# Patient Record
Sex: MALE | Race: WHITE | NOT HISPANIC OR LATINO | Employment: OTHER | URBAN - METROPOLITAN AREA
[De-identification: names, ages, dates, MRNs, and addresses within clinical notes are randomized per-mention and may not be internally consistent; named-entity substitution may affect disease eponyms.]

---

## 2017-01-02 ENCOUNTER — TRANSCRIBE ORDERS (OUTPATIENT)
Dept: ADMINISTRATIVE | Facility: HOSPITAL | Age: 62
End: 2017-01-02

## 2017-01-02 ENCOUNTER — HOSPITAL ENCOUNTER (OUTPATIENT)
Dept: RADIOLOGY | Facility: HOSPITAL | Age: 62
Discharge: HOME | End: 2017-01-02
Attending: UROLOGY
Payer: COMMERCIAL

## 2017-01-02 DIAGNOSIS — N20.0 CALCULUS OF KIDNEY: ICD-10-CM

## 2017-01-02 PROCEDURE — 74000 HB X-RAY EXAM OF ABDOMEN (SINGLE ANTEROPOSTERIOR VIEW): CPT

## 2017-01-12 ENCOUNTER — ALLSCRIPTS OFFICE VISIT (OUTPATIENT)
Dept: OTHER | Facility: OTHER | Age: 62
End: 2017-01-12

## 2017-02-21 ENCOUNTER — APPOINTMENT (OUTPATIENT)
Dept: LAB | Facility: CLINIC | Age: 62
End: 2017-02-21
Payer: COMMERCIAL

## 2017-02-21 DIAGNOSIS — E11.9 TYPE 2 DIABETES MELLITUS WITHOUT COMPLICATIONS (HCC): ICD-10-CM

## 2017-02-21 LAB
ALBUMIN SERPL BCP-MCNC: 3.6 G/DL (ref 3.5–5)
ALP SERPL-CCNC: 92 U/L (ref 46–116)
ALT SERPL W P-5'-P-CCNC: 36 U/L (ref 12–78)
ANION GAP SERPL CALCULATED.3IONS-SCNC: 6 MMOL/L (ref 4–13)
AST SERPL W P-5'-P-CCNC: 26 U/L (ref 5–45)
BILIRUB SERPL-MCNC: 0.5 MG/DL (ref 0.2–1)
BUN SERPL-MCNC: 22 MG/DL (ref 5–25)
CALCIUM SERPL-MCNC: 9.3 MG/DL (ref 8.3–10.1)
CHLORIDE SERPL-SCNC: 103 MMOL/L (ref 100–108)
CHOLEST SERPL-MCNC: 113 MG/DL (ref 50–200)
CO2 SERPL-SCNC: 28 MMOL/L (ref 21–32)
CREAT SERPL-MCNC: 1.34 MG/DL (ref 0.6–1.3)
CREAT UR-MCNC: 82 MG/DL
ERYTHROCYTE [DISTWIDTH] IN BLOOD BY AUTOMATED COUNT: 14.9 % (ref 11.6–15.1)
EST. AVERAGE GLUCOSE BLD GHB EST-MCNC: 143 MG/DL
GFR SERPL CREATININE-BSD FRML MDRD: 54.2 ML/MIN/1.73SQ M
GLUCOSE SERPL-MCNC: 132 MG/DL (ref 65–140)
HBA1C MFR BLD: 6.6 % (ref 4.2–6.3)
HCT VFR BLD AUTO: 41.3 % (ref 36.5–49.3)
HDLC SERPL-MCNC: 43 MG/DL (ref 40–60)
HGB BLD-MCNC: 13.1 G/DL (ref 12–17)
LDLC SERPL CALC-MCNC: 37 MG/DL (ref 0–100)
MCH RBC QN AUTO: 27.6 PG (ref 26.8–34.3)
MCHC RBC AUTO-ENTMCNC: 31.7 G/DL (ref 31.4–37.4)
MCV RBC AUTO: 87 FL (ref 82–98)
MICROALBUMIN UR-MCNC: 10.4 MG/L (ref 0–20)
MICROALBUMIN/CREAT 24H UR: 13 MG/G CREATININE (ref 0–30)
PLATELET # BLD AUTO: 215 THOUSANDS/UL (ref 149–390)
PMV BLD AUTO: 11.2 FL (ref 8.9–12.7)
POTASSIUM SERPL-SCNC: 5.2 MMOL/L (ref 3.5–5.3)
PROT SERPL-MCNC: 7.6 G/DL (ref 6.4–8.2)
RBC # BLD AUTO: 4.75 MILLION/UL (ref 3.88–5.62)
SODIUM SERPL-SCNC: 137 MMOL/L (ref 136–145)
TRIGL SERPL-MCNC: 166 MG/DL
TSH SERPL DL<=0.05 MIU/L-ACNC: 2.21 UIU/ML (ref 0.36–3.74)
WBC # BLD AUTO: 11.73 THOUSAND/UL (ref 4.31–10.16)

## 2017-02-21 PROCEDURE — 84443 ASSAY THYROID STIM HORMONE: CPT

## 2017-02-21 PROCEDURE — 82570 ASSAY OF URINE CREATININE: CPT

## 2017-02-21 PROCEDURE — 82043 UR ALBUMIN QUANTITATIVE: CPT

## 2017-02-21 PROCEDURE — 36415 COLL VENOUS BLD VENIPUNCTURE: CPT

## 2017-02-21 PROCEDURE — 80061 LIPID PANEL: CPT

## 2017-02-21 PROCEDURE — 80053 COMPREHEN METABOLIC PANEL: CPT

## 2017-02-21 PROCEDURE — 85027 COMPLETE CBC AUTOMATED: CPT

## 2017-02-21 PROCEDURE — 83036 HEMOGLOBIN GLYCOSYLATED A1C: CPT

## 2017-03-01 ENCOUNTER — ALLSCRIPTS OFFICE VISIT (OUTPATIENT)
Dept: OTHER | Facility: OTHER | Age: 62
End: 2017-03-01

## 2017-03-24 ENCOUNTER — GENERIC CONVERSION - ENCOUNTER (OUTPATIENT)
Dept: OTHER | Facility: OTHER | Age: 62
End: 2017-03-24

## 2017-03-27 ENCOUNTER — TRANSCRIBE ORDERS (OUTPATIENT)
Dept: ADMINISTRATIVE | Facility: HOSPITAL | Age: 62
End: 2017-03-27

## 2017-03-27 ENCOUNTER — HOSPITAL ENCOUNTER (OUTPATIENT)
Dept: RADIOLOGY | Facility: HOSPITAL | Age: 62
Discharge: HOME | End: 2017-03-27
Payer: COMMERCIAL

## 2017-03-27 DIAGNOSIS — N20.0 CALCULUS OF KIDNEY: ICD-10-CM

## 2017-03-27 PROCEDURE — 74000 HB X-RAY EXAM OF ABDOMEN (SINGLE ANTEROPOSTERIOR VIEW): CPT

## 2017-04-11 ENCOUNTER — ALLSCRIPTS OFFICE VISIT (OUTPATIENT)
Dept: OTHER | Facility: OTHER | Age: 62
End: 2017-04-11

## 2017-06-01 ENCOUNTER — APPOINTMENT (OUTPATIENT)
Dept: LAB | Facility: CLINIC | Age: 62
End: 2017-06-01
Payer: COMMERCIAL

## 2017-06-01 ENCOUNTER — GENERIC CONVERSION - ENCOUNTER (OUTPATIENT)
Dept: OTHER | Facility: OTHER | Age: 62
End: 2017-06-01

## 2017-06-01 ENCOUNTER — TRANSCRIBE ORDERS (OUTPATIENT)
Dept: LAB | Facility: CLINIC | Age: 62
End: 2017-06-01

## 2017-06-01 DIAGNOSIS — E11.9 TYPE 2 DIABETES MELLITUS WITHOUT COMPLICATIONS (HCC): ICD-10-CM

## 2017-06-01 LAB
ALBUMIN SERPL BCP-MCNC: 3.3 G/DL (ref 3.5–5)
ALP SERPL-CCNC: 84 U/L (ref 46–116)
ALT SERPL W P-5'-P-CCNC: 30 U/L (ref 12–78)
ANION GAP SERPL CALCULATED.3IONS-SCNC: 10 MMOL/L (ref 4–13)
AST SERPL W P-5'-P-CCNC: 21 U/L (ref 5–45)
BILIRUB SERPL-MCNC: 0.3 MG/DL (ref 0.2–1)
BUN SERPL-MCNC: 21 MG/DL (ref 5–25)
CALCIUM SERPL-MCNC: 8.8 MG/DL (ref 8.3–10.1)
CHLORIDE SERPL-SCNC: 103 MMOL/L (ref 100–108)
CHOLEST SERPL-MCNC: 121 MG/DL (ref 50–200)
CO2 SERPL-SCNC: 25 MMOL/L (ref 21–32)
CREAT SERPL-MCNC: 1.36 MG/DL (ref 0.6–1.3)
ERYTHROCYTE [DISTWIDTH] IN BLOOD BY AUTOMATED COUNT: 14.4 % (ref 11.6–15.1)
EST. AVERAGE GLUCOSE BLD GHB EST-MCNC: 143 MG/DL
GFR SERPL CREATININE-BSD FRML MDRD: 53.3 ML/MIN/1.73SQ M
GLUCOSE P FAST SERPL-MCNC: 141 MG/DL (ref 65–99)
HBA1C MFR BLD: 6.6 % (ref 4.2–6.3)
HCT VFR BLD AUTO: 41 % (ref 36.5–49.3)
HDLC SERPL-MCNC: 41 MG/DL (ref 40–60)
HGB BLD-MCNC: 13.3 G/DL (ref 12–17)
LDLC SERPL CALC-MCNC: 49 MG/DL (ref 0–100)
MCH RBC QN AUTO: 28.5 PG (ref 26.8–34.3)
MCHC RBC AUTO-ENTMCNC: 32.4 G/DL (ref 31.4–37.4)
MCV RBC AUTO: 88 FL (ref 82–98)
PLATELET # BLD AUTO: 191 THOUSANDS/UL (ref 149–390)
PMV BLD AUTO: 11.6 FL (ref 8.9–12.7)
POTASSIUM SERPL-SCNC: 4.7 MMOL/L (ref 3.5–5.3)
PROT SERPL-MCNC: 7 G/DL (ref 6.4–8.2)
RBC # BLD AUTO: 4.67 MILLION/UL (ref 3.88–5.62)
SODIUM SERPL-SCNC: 138 MMOL/L (ref 136–145)
TRIGL SERPL-MCNC: 157 MG/DL
WBC # BLD AUTO: 9.41 THOUSAND/UL (ref 4.31–10.16)

## 2017-06-01 PROCEDURE — 80053 COMPREHEN METABOLIC PANEL: CPT

## 2017-06-01 PROCEDURE — 83036 HEMOGLOBIN GLYCOSYLATED A1C: CPT

## 2017-06-01 PROCEDURE — 80061 LIPID PANEL: CPT

## 2017-06-01 PROCEDURE — 36415 COLL VENOUS BLD VENIPUNCTURE: CPT

## 2017-06-01 PROCEDURE — 85027 COMPLETE CBC AUTOMATED: CPT

## 2017-06-07 ENCOUNTER — ALLSCRIPTS OFFICE VISIT (OUTPATIENT)
Dept: OTHER | Facility: OTHER | Age: 62
End: 2017-06-07

## 2017-09-11 ENCOUNTER — HOSPITAL ENCOUNTER (OUTPATIENT)
Dept: RADIOLOGY | Facility: HOSPITAL | Age: 62
Discharge: HOME | End: 2017-09-11
Payer: COMMERCIAL

## 2017-09-11 ENCOUNTER — TRANSCRIBE ORDERS (OUTPATIENT)
Dept: ADMINISTRATIVE | Facility: HOSPITAL | Age: 62
End: 2017-09-11

## 2017-09-11 ENCOUNTER — APPOINTMENT (OUTPATIENT)
Dept: LAB | Facility: CLINIC | Age: 62
End: 2017-09-11
Payer: COMMERCIAL

## 2017-09-11 DIAGNOSIS — Z12.5 ENCOUNTER FOR SCREENING FOR MALIGNANT NEOPLASM OF PROSTATE: ICD-10-CM

## 2017-09-11 DIAGNOSIS — N20.0 CALCULUS OF KIDNEY: ICD-10-CM

## 2017-09-11 DIAGNOSIS — E11.9 TYPE 2 DIABETES MELLITUS WITHOUT COMPLICATIONS (HCC): ICD-10-CM

## 2017-09-11 LAB — PSA SERPL-MCNC: 1.2 NG/ML (ref 0–4)

## 2017-09-11 PROCEDURE — G0103 PSA SCREENING: HCPCS

## 2017-09-11 PROCEDURE — 74000 HB X-RAY EXAM OF ABDOMEN (SINGLE ANTEROPOSTERIOR VIEW): CPT

## 2017-09-11 PROCEDURE — 36415 COLL VENOUS BLD VENIPUNCTURE: CPT

## 2017-10-03 ENCOUNTER — APPOINTMENT (OUTPATIENT)
Dept: LAB | Facility: CLINIC | Age: 62
End: 2017-10-03
Payer: COMMERCIAL

## 2017-10-03 ENCOUNTER — TRANSCRIBE ORDERS (OUTPATIENT)
Dept: LAB | Facility: CLINIC | Age: 62
End: 2017-10-03

## 2017-10-03 DIAGNOSIS — E11.9 TYPE 2 DIABETES MELLITUS WITHOUT COMPLICATIONS (HCC): ICD-10-CM

## 2017-10-03 DIAGNOSIS — N20.0 CALCULUS OF KIDNEY: ICD-10-CM

## 2017-10-03 LAB
ALBUMIN SERPL BCP-MCNC: 3.3 G/DL (ref 3.5–5)
ALP SERPL-CCNC: 86 U/L (ref 46–116)
ALT SERPL W P-5'-P-CCNC: 47 U/L (ref 12–78)
ANION GAP SERPL CALCULATED.3IONS-SCNC: 6 MMOL/L (ref 4–13)
AST SERPL W P-5'-P-CCNC: 27 U/L (ref 5–45)
BILIRUB SERPL-MCNC: 0.3 MG/DL (ref 0.2–1)
BUN SERPL-MCNC: 23 MG/DL (ref 5–25)
CALCIUM SERPL-MCNC: 9.4 MG/DL (ref 8.3–10.1)
CHLORIDE SERPL-SCNC: 102 MMOL/L (ref 100–108)
CHOLEST SERPL-MCNC: 119 MG/DL (ref 50–200)
CO2 SERPL-SCNC: 28 MMOL/L (ref 21–32)
CREAT SERPL-MCNC: 1.31 MG/DL (ref 0.6–1.3)
ERYTHROCYTE [DISTWIDTH] IN BLOOD BY AUTOMATED COUNT: 14.2 % (ref 11.6–15.1)
EST. AVERAGE GLUCOSE BLD GHB EST-MCNC: 148 MG/DL
GFR SERPL CREATININE-BSD FRML MDRD: 58 ML/MIN/1.73SQ M
GLUCOSE P FAST SERPL-MCNC: 150 MG/DL (ref 65–99)
HBA1C MFR BLD: 6.8 % (ref 4.2–6.3)
HCT VFR BLD AUTO: 39.2 % (ref 36.5–49.3)
HDLC SERPL-MCNC: 37 MG/DL (ref 40–60)
HGB BLD-MCNC: 12.5 G/DL (ref 12–17)
LDLC SERPL CALC-MCNC: 40 MG/DL (ref 0–100)
MCH RBC QN AUTO: 28.6 PG (ref 26.8–34.3)
MCHC RBC AUTO-ENTMCNC: 31.9 G/DL (ref 31.4–37.4)
MCV RBC AUTO: 90 FL (ref 82–98)
PLATELET # BLD AUTO: 193 THOUSANDS/UL (ref 149–390)
PMV BLD AUTO: 11.6 FL (ref 8.9–12.7)
POTASSIUM SERPL-SCNC: 4.9 MMOL/L (ref 3.5–5.3)
PROT SERPL-MCNC: 7 G/DL (ref 6.4–8.2)
RBC # BLD AUTO: 4.37 MILLION/UL (ref 3.88–5.62)
SODIUM SERPL-SCNC: 136 MMOL/L (ref 136–145)
TRIGL SERPL-MCNC: 211 MG/DL
TSH SERPL DL<=0.05 MIU/L-ACNC: 3.01 UIU/ML (ref 0.36–3.74)
URATE SERPL-MCNC: 6.5 MG/DL (ref 4.2–8)
WBC # BLD AUTO: 9.94 THOUSAND/UL (ref 4.31–10.16)

## 2017-10-03 PROCEDURE — 80061 LIPID PANEL: CPT

## 2017-10-03 PROCEDURE — 36415 COLL VENOUS BLD VENIPUNCTURE: CPT

## 2017-10-03 PROCEDURE — 84550 ASSAY OF BLOOD/URIC ACID: CPT

## 2017-10-03 PROCEDURE — 84443 ASSAY THYROID STIM HORMONE: CPT

## 2017-10-03 PROCEDURE — 85027 COMPLETE CBC AUTOMATED: CPT

## 2017-10-03 PROCEDURE — 80053 COMPREHEN METABOLIC PANEL: CPT

## 2017-10-03 PROCEDURE — 83036 HEMOGLOBIN GLYCOSYLATED A1C: CPT

## 2017-10-16 ENCOUNTER — GENERIC CONVERSION - ENCOUNTER (OUTPATIENT)
Dept: OTHER | Facility: OTHER | Age: 62
End: 2017-10-16

## 2017-10-20 ENCOUNTER — APPOINTMENT (OUTPATIENT)
Dept: LAB | Facility: CLINIC | Age: 62
End: 2017-10-20
Payer: COMMERCIAL

## 2017-10-20 ENCOUNTER — ALLSCRIPTS OFFICE VISIT (OUTPATIENT)
Dept: OTHER | Facility: OTHER | Age: 62
End: 2017-10-20

## 2017-10-20 ENCOUNTER — TRANSCRIBE ORDERS (OUTPATIENT)
Dept: LAB | Facility: CLINIC | Age: 62
End: 2017-10-20

## 2017-10-20 DIAGNOSIS — N20.0 CALCULUS OF KIDNEY: ICD-10-CM

## 2017-10-20 LAB
MAGNESIUM SERPL-MCNC: 1.7 MG/DL (ref 1.6–2.6)
PHOSPHATE SERPL-MCNC: 4 MG/DL (ref 2.3–4.1)
PTH-INTACT SERPL-MCNC: 24.9 PG/ML (ref 14–72)

## 2017-10-20 PROCEDURE — 36415 COLL VENOUS BLD VENIPUNCTURE: CPT

## 2017-10-20 PROCEDURE — 83970 ASSAY OF PARATHORMONE: CPT

## 2017-10-20 PROCEDURE — 83735 ASSAY OF MAGNESIUM: CPT

## 2017-10-20 PROCEDURE — 84100 ASSAY OF PHOSPHORUS: CPT

## 2017-10-23 ENCOUNTER — TRANSCRIBE ORDERS (OUTPATIENT)
Dept: LAB | Facility: CLINIC | Age: 62
End: 2017-10-23

## 2017-10-23 ENCOUNTER — APPOINTMENT (OUTPATIENT)
Dept: LAB | Facility: CLINIC | Age: 62
End: 2017-10-23
Payer: COMMERCIAL

## 2017-10-23 DIAGNOSIS — N20.0 CALCULUS OF KIDNEY: ICD-10-CM

## 2017-10-23 PROCEDURE — 82436 ASSAY OF URINE CHLORIDE: CPT

## 2017-10-23 PROCEDURE — 84105 ASSAY OF URINE PHOSPHORUS: CPT

## 2017-10-23 PROCEDURE — 82507 ASSAY OF CITRATE: CPT

## 2017-10-23 PROCEDURE — 84133 ASSAY OF URINE POTASSIUM: CPT

## 2017-10-23 PROCEDURE — 82570 ASSAY OF URINE CREATININE: CPT

## 2017-10-23 PROCEDURE — 82140 ASSAY OF AMMONIA: CPT

## 2017-10-23 PROCEDURE — 83935 ASSAY OF URINE OSMOLALITY: CPT

## 2017-10-23 PROCEDURE — 82340 ASSAY OF CALCIUM IN URINE: CPT

## 2017-10-23 PROCEDURE — 81003 URINALYSIS AUTO W/O SCOPE: CPT

## 2017-10-23 PROCEDURE — 83945 ASSAY OF OXALATE: CPT

## 2017-10-23 PROCEDURE — 83735 ASSAY OF MAGNESIUM: CPT

## 2017-10-23 PROCEDURE — 84392 ASSAY OF URINE SULFATE: CPT

## 2017-10-23 PROCEDURE — 84560 ASSAY OF URINE/URIC ACID: CPT

## 2017-10-23 PROCEDURE — 82131 AMINO ACIDS SINGLE QUANT: CPT

## 2017-10-23 PROCEDURE — 84300 ASSAY OF URINE SODIUM: CPT

## 2017-10-24 NOTE — PROGRESS NOTES
Assessment  1  Left nephrolithiasis (592 0) (N20 0)   2  Nephrolithiasis (592 0) (N20 0)   3  Encounter for prostate cancer screening (V76 44) (Z12 5)    Plan  Nephrolithiasis    · (1) MAGNESIUM; Status:Resulted - Requires Verification;   Done: 90QRY8404 10:58AM   MUB:32QAH5537; Ordered;For:Nephrolithiasis; Ordered By:PASCUAL De Los Santos AT [facility];   · (1) PHOSPHORUS; Status:Resulted - Requires Verification;   Done: 35YAX6067 10:58AM   BQO:82WTV5223; Ordered;For:Nephrolithiasis; Ordered By:Johnna De Los Santos;   · (1) PTH N-TERMINAL (INTACT); Status:Resulted - Requires Verification;   Done:  60MLY1369 10:58AM   CWY:68SPK3947; Ordered;For:Nephrolithiasis; Ordered By:Johnna De Los Santos;   · (1) [facility]; Status:Active - Retrospective Authorization; Requested IPW:[id number];    Perform:[facility]; [facility]:[id number]; Last Updated By:PASCUAL De Los Santos AT THE [facility]; 10/20/2017 10:04:46 AM;Ordered;For:Nephrolithiasis; Ordered By:PASCUAL De Los Santos AT [facility]; Discussion/Summary  Discussion Summary:   1  nephrolithiasis -- managed by Dr Devin Franco the patient his KUB resultsoptions of continued observation versus ESWL  We will continue observation at this pointwill go for metabolic workup and follow up in 1 month to review the results  to contact us sooner with any s/sx of passing stoneall questions answered  Chief Complaint  Chief Complaint Free Text Note Form: Patient presents for prostate cancer screening, nephrolithiasis  1 2 (9/11/17)      History of Present Illness  HPI: Daylin Estes is a 26-year-old male patient of Dr Daniel Taylor with a history of nephrolithiasis status post right ESWL 11/17/2016 presenting for 6 month follow-up  most recent KUB reveals bilateral renal calculi with increased stone burden on the left  Patient denies any interval passage of stones or the past few months  She denies any flank pain, gross hematuria, dysuria, nausea, vomiting, fevers, or chills    feels like he has a fair stream, feels empty after urination, has nocturia 1-2 times nightly  He denies any urgency, hematuria, dysuria, hesitancy, or incontinence  most recent PSA was 1 2 (9/11/17)  Review of Systems  Complete-Male Urology:   Constitutional: No fever or chills, feels well, no tiredness, no recent weight gain or weight loss  Respiratory: No complaints of shortness of breath, no wheezing, no cough, no SOB on exertion, no orthopnea or PND  Cardiovascular: No complaints of slow heart rate, no fast heart rate, no chest pain, no palpitations, no leg claudication, no lower extremity  Gastrointestinal: No complaints of abdominal pain, no constipation, no nausea or vomiting, no diarrhea or bloody stools  Genitourinary: Empty sensation-- and-- stream quality fair, but-- no dysuria,-- no urinary hesitancy,-- no hematuria,-- no incontinence-- and-- no feelings of urinary urgency--    The patient presents with complaints of nocturia (1-2 times )  Musculoskeletal: No complaints of arthralgia, no myalgias, no joint swelling or stiffness, no limb pain or swelling  Integumentary: No complaints of skin rash or skin lesions, no itching, no skin wound, no dry skin  Hematologic/Lymphatic: No complaints of swollen glands, no swollen glands in the neck, does not bleed easily, no easy bruising  Neurological: No compliants of headache, no confusion, no convulsions, no numbness or tingling, no dizziness or fainting, no limb weakness, no difficulty walking  ROS Reviewed:   ROS reviewed  Active Problems  1  Benign essential hypertension (401 1) (I10)   2  Callus (700) (L84)   3  Carpal tunnel syndrome, bilateral (354 0) (G56 03)   4  Colonoscopy (Fiberoptic) Screening   5  Constipation due to opioid therapy (564 09,E935 2) (K59 03,T40 2X5A)   6  Encounter for prostate cancer screening (V76 44) (Z12 5)   7  Hyperkalemia (276 7) (E87 5)   8  Hyperlipidemia (272 4) (E78 5)   9   Laceration of face, multiple sites (873 49) (S01 81XA)   10  Left nephrolithiasis (592 0) (N20 0)   11  Leukocytosis (288 60) (D72 829)   12  Morbid or severe obesity due to excess calories (278 01) (E66 01)   13  Need for pneumococcal vaccination (V03 82) (Z23)   14  Nephrolithiasis (592 0) (N20 0)   15  Plantar callus (700) (L84)   16  Renal insufficiency (593 9) (N28 9)   17  Type 2 diabetes mellitus (250 00) (E11 9)    Past Medical History  1  History of _   2  History of _   3  History of _   4  History of Gout (274 9) (M10 9)   5  History of leukocytosis (V12 3) (Z86 2)   6  History of onychomycosis (V12 09) (Z86 19)   7  History of Other muscle spasm (728 85) (M62 838)   8  History of Shoulder joint pain, unspecified laterality  Active Problems And Past Medical History Reviewed: The active problems and past medical history were reviewed and updated today  Surgical History  1  History of Femoral Hernia Repair Unilateral   2  History of Neuroplasty Median Nerve At Carpal Tunnel  Surgical History Reviewed: The surgical history was reviewed and updated today  Family History  Mother    1  Family history of Coronary Artery Disease (V17 49)  Father    2  Family history of Heart Disease (V17 49)   3  Family history of Lung Cancer (V16 1)  Family History Reviewed: The family history was reviewed and updated today  Social History   · Living Independently Alone (V60 3)   · Marital History - Single   · Never A Smoker   · Never Drank Alcohol  Social History Reviewed: The social history was reviewed and is unchanged  Current Meds   1  Allopurinol 100 MG Oral Tablet; take 1 tablet by mouth every day; Therapy: 37TBY5701 to (Evaluate:12Qhm6502)  Requested for: 84Jzu4158; Last   Rx:28Ifd5724 Ordered   2  Cinnamon TABS; Therapy: (Recorded:41Tnm4919) to Recorded   3  Januvia 100 MG Oral Tablet; take 1 tablet by mouth every morning; Therapy: 70MZN3483 to (Evaluate:25Qgj7280)  Requested for: 93Kca8798; Last   Rx:53Juo1604 Ordered   4  Lisinopril 10 MG Oral Tablet; TAKE 1 TABLET BY MOUTH ONCE A DAY TO PROTECT   DIABETIC KIDNEY; Therapy: 99MTB6611 to (Evaluate:61Bwo3052)  Requested for: 99BPF6754; Last   Rx:30Nip5164 Ordered   5  MetFORMIN HCl - 1000 MG Oral Tablet; TAKE 1 TABLET BY MOUTH EVERY MORNING   AND TAKE 1 TABLET BY MOUTH BEFORE SUPPER; Therapy: 19SJF2381 to (Evaluate:32Ini6324)  Requested for: 05MNK7659; Last   Rx:66Vap6131 Ordered   6  Metoprolol Tartrate 25 MG Oral Tablet; Take 1 tablet twice daily; Therapy: 13GSM0664 to (([phone] 3260-775)  Requested for: 94JMY9306; Last   OR:10UJS0443 Ordered   7  Multiple Vitamin TABS; Therapy: (Recorded:24Aev5126) to Recorded   8  Simvastatin 10 MG Oral Tablet; TAKE 1 TABLET BY MOUTH ONCE A DAY; Therapy: 87BEC1134 to (Mira Singh)  Requested for: 36Itf0599; Last   Rx:27Cep5423 Ordered  Medication List Reviewed: The medication list was reviewed and updated today  Allergies  1  Penicillins    Vitals  Vital Signs    Recorded: 84CUZ8577 09:37AM   Heart Rate 68   Systolic 391   Diastolic 78   Height 5 ft 10 in   Weight 294 lb 4 oz   BMI Calculated 42 22   BSA Calculated 2 46     Physical Exam    Constitutional   General appearance: No acute distress, well appearing and well nourished  Pulmonary   Respiratory effort: No increased work of breathing or signs of respiratory distress  Cardiovascular   Examination of extremities for edema and/or varicosities: Normal     Musculoskeletal   Gait and station: Normal     Skin   Skin and subcutaneous tissue: Normal without rashes or lesions  Additional Exam:  no focal neurologic deficits  Mood and affect appropriate        Results/Data  (1) PHOSPHORUS 20Oct2017 10:58AM Venessa Jackson    Order Number: [id number]     Test Name Result Flag Reference   PHOSPHORUS 4 0 mg/dL  2 3-4 1     AUA Symptom Score 45VNY5748 09:37AM Beverley, s     Test Name Result Flag Reference   AUA Symptom Score (for prostate disease) 2 Incomplete emptying: Not at all (0)  Frequency: Not at all (0)  Intermittency: Not at all (0)  Urgency: Not at all (0)  Weak-stream: Not at all (0)  Straining: Not at all (0)  Nocturia: Less than half the time (2)   AUA Symptom Score (for prostate disease) - Quality of Life Due to Urinary Symptoms Pleased     AUA Symptom Score (for prostate disease) - Score Category Mild         Future Appointments    Date/Time Provider Specialty Site   01/22/2018 08:20 ROSALIE Martinez   Family Medicine FAMILY PRACTICE [facility]   11/08/2017 11:00 AM [address] [facility] [address] [place] [address] FOR UROLOGY Carey Sanchez     Signatures   Electronically signed by : Yoana Linton, ; Oct 20 2017  3:12PM EST                       (Author)    Electronically signed by : ROSALIE Velazquez ; Oct 23 2017  3:44PM EST

## 2017-11-06 LAB
AMMONIA 24H UR-MRATE: 24 MEQ/24 HR
AMMONIA UR-SCNC: ABNORMAL UG/DL
CA H2 PHOS DIHYD CRY URNS QL MICRO: 0.05 RATIO (ref 0–3)
CALCIUM 24H UR-MCNC: 4.7 MG/DL
CALCIUM 24H UR-MRATE: 98.7 MG/24 HR (ref 100–300)
CHLORIDE 24H UR-SCNC: 67 MMOL/L
CHLORIDE 24H UR-SRATE: 141 MMOL/24 HR (ref 110–250)
CITRATE 24H UR-MCNC: 46 MG/L
CITRATE 24H UR-MRATE: 97 MG/24 HR (ref 320–1240)
COM CRY STONE QL IR: 3.81 RATIO (ref 0–6)
CREAT 24H UR-MCNC: 56.8 MG/DL
CREAT 24H UR-MRATE: 1192.8 MG/24 HR (ref 1000–2000)
CYSTINE 24H UR-MCNC: 13.5 MG/L
CYSTINE 24H UR-MRATE: 28.35 MG/24 HR (ref 10–100)
MAGNESIUM 24H UR-MRATE: 101 MG/24 HR (ref 12–293)
MAGNESIUM UR-MCNC: 4.8 MG/DL
NA URATE CRY STONE QL IR: 0.64 RATIO (ref 0–4)
OSMOLALITY UR: 412 MOSMOL/KG (ref 300–900)
OXALATE 24H UR-MRATE: 38 MG/24 HR (ref 7–44)
OXALATE UR-MCNC: 18 MG/L
PH 24H UR: 5.2 [PH]
PHOSPHATE 24H UR-MCNC: 15.8 MG/DL
PHOSPHATE 24H UR-MRATE: 331.8 MG/24 HR (ref 400–1300)
PLEASE NOTE (STONE RISK): ABNORMAL
POTASSIUM 24H UR-SCNC: 40.1 MMOL/24 HR (ref 25–125)
POTASSIUM UR-SCNC: 19.1 MMOL/L
PRESERVED URINE: 2100 ML/24 HR (ref 800–1800)
SODIUM 24H UR-SCNC: 73 MMOL/L
SODIUM 24H UR-SRATE: 153 MMOL/24 HR (ref 58–337)
SPECIMEN VOL 24H UR: 2100 ML/24 HR (ref 800–1800)
SULFATE 24H UR-MCNC: 21 MEQ/24 HR (ref 0–30)
SULFATE UR-MCNC: 10 MEQ/L
TRI-PHOS CRY STONE MICRO: 0.01 RATIO (ref 0–1)
URATE 24H UR-MCNC: 16.3 MG/DL
URATE 24H UR-MRATE: 342 MG/24 HR (ref 250–750)
URATE DIHYD CRY STONE QL IR: 2.16 RATIO (ref 0–1.2)

## 2017-11-08 ENCOUNTER — GENERIC CONVERSION - ENCOUNTER (OUTPATIENT)
Dept: OTHER | Facility: OTHER | Age: 62
End: 2017-11-08

## 2018-01-09 NOTE — RESULT NOTES
Verified Results  (1) CBC/ PLT (NO DIFF) 46CJS9205 08:34AM Fabiola Amrita Order Number: [id number]     Test Name Result Flag Reference   HEMATOCRIT 41 0 %  36 5-49 3   HEMOGLOBIN 13 3 g/dL  12 0-17 0   MCHC 32 4 g/dL  31 4-37 4   MCH 28 5 pg  26 8-34 3   MCV 88 fL  82-98   PLATELET COUNT 277 Thousands/uL  149-390   RBC COUNT 4 67 Million/uL  3 88-5 62   RDW 14 4 %  11 6-15 1   WBC COUNT 9 41 Thousand/uL  4 31-10 16   MPV 11 6 fL  8 9-12 7     (1) COMPREHENSIVE METABOLIC PANEL 58YYC1878 85:02JU Fabiola Amrita Order Number: [id number]     Test Name Result Flag Reference   SODIUM 138 mmol/L  136-145   POTASSIUM 4 7 mmol/L  3 5-5 3   CHLORIDE 103 mmol/L  100-108   CARBON DIOXIDE 25 mmol/L  21-32   ANION GAP (CALC) 10 mmol/L  4-13   BLOOD UREA NITROGEN 21 mg/dL  5-25   CREATININE 1 36 mg/dL H 0 60-1 30   Standardized to IDMS reference method   CALCIUM 8 8 mg/dL  8 3-10 1   BILI, TOTAL 0 30 mg/dL  0 20-1 00   ALK PHOSPHATAS 84 U/L     ALT (SGPT) 30 U/L  12-78   AST(SGOT) 21 U/L  5-45   ALBUMIN 3 3 g/dL L 3 5-5 0   TOTAL PROTEIN 7 0 g/dL  6 4-8 2   eGFR Non-African American 53 3 ml/min/1 73sq m     National Kidney Disease Education Program recommendations are as follows:  GFR calculation is accurate only with a steady state creatinine  Chronic Kidney disease less than 60 ml/min/1 73 sq  meters  Kidney failure less than 15 ml/min/1 73 sq  meters  GLUCOSE FASTING 141 mg/dL H 65-99     (1) HEMOGLOBIN A1C 01Jun2017 08:34AM Fabiola Amrita Order Number: [id number]     Test Name Result Flag Reference   HEMOGLOBIN A1C 6 6 % H 4 2-6 3   EST  AVG  GLUCOSE 143 mg/dl       (1) LIPID PANEL, FASTING 01Jun2017 08:34AM Fabiola Amrita Order Number: [id number]     Test Name Result Flag Reference   CHOLESTEROL 121 mg/dL     HDL,DIRECT 41 mg/dL  40-60   Specimen collection should occur prior to Metamizole administration due to the potential for falsely depressed results  LDL CHOLESTEROL CALCULATED 49 mg/dL  0-100   - Patient Instructions: This is a fasting blood test  Water,black tea or black  coffee only after 9:00pm the night before test   Drink 2 glasses of water the morning of test       Triglyceride:         Normal              <150 mg/dl       Borderline High    150-199 mg/dl       High               200-499 mg/dl       Very High          >499 mg/dl  Cholesterol:         Desirable        <200 mg/dl      Borderline High  200-239 mg/dl      High             >239 mg/dl  HDL Cholesterol:        High    >59 mg/dL      Low     <41 mg/dL  LDL CALCULATED:    This screening LDL is a calculated result  It does not have the accuracy of the Direct Measured LDL in the monitoring of patients with hyperlipidemia and/or statin therapy  Direct Measure LDL (VOC155) must be ordered separately in these patients  TRIGLYCERIDES 157 mg/dL H <=150   Specimen collection should occur prior to N-Acetylcysteine or Metamizole administration due to the potential for falsely depressed results  Plan  Type 2 diabetes mellitus    · (1) HEMOGLOBIN A1C ; every 3 months;  Last 15CRS7772; Next 52Sxq1272;  Status:Active

## 2018-01-10 NOTE — MISCELLANEOUS
Message  GI Reminder Recall [employer]:   Date: 04/12/2016   Dear Tee Hurtado:     Review of our records shows you are due for the following: Colonoscopy  Please call the following office to schedule your appointment:   [address] [facility], [facility] [address] [place] [address], [address] [place] [address]  [phone]  We look forward to hearing from you! Sincerely,         Signatures   Electronically signed by :  Sagar Mathias, ; Apr 12 2016 12:31PM EST                       (Author)

## 2018-01-12 VITALS
BODY MASS INDEX: 40.09 KG/M2 | HEIGHT: 70 IN | DIASTOLIC BLOOD PRESSURE: 70 MMHG | RESPIRATION RATE: 16 BRPM | HEART RATE: 70 BPM | SYSTOLIC BLOOD PRESSURE: 130 MMHG | WEIGHT: 280 LBS

## 2018-01-13 VITALS
BODY MASS INDEX: 42.13 KG/M2 | HEIGHT: 70 IN | DIASTOLIC BLOOD PRESSURE: 78 MMHG | WEIGHT: 294.25 LBS | SYSTOLIC BLOOD PRESSURE: 138 MMHG | HEART RATE: 68 BPM

## 2018-01-13 VITALS
WEIGHT: 294 LBS | HEART RATE: 82 BPM | SYSTOLIC BLOOD PRESSURE: 150 MMHG | RESPIRATION RATE: 16 BRPM | HEIGHT: 70 IN | DIASTOLIC BLOOD PRESSURE: 72 MMHG | BODY MASS INDEX: 42.09 KG/M2

## 2018-01-14 VITALS
DIASTOLIC BLOOD PRESSURE: 72 MMHG | BODY MASS INDEX: 41.54 KG/M2 | HEART RATE: 68 BPM | SYSTOLIC BLOOD PRESSURE: 128 MMHG | HEIGHT: 70 IN | WEIGHT: 290.13 LBS

## 2018-01-14 VITALS
BODY MASS INDEX: 42.66 KG/M2 | WEIGHT: 298 LBS | SYSTOLIC BLOOD PRESSURE: 118 MMHG | HEART RATE: 70 BPM | DIASTOLIC BLOOD PRESSURE: 80 MMHG | HEIGHT: 70 IN

## 2018-01-15 NOTE — MISCELLANEOUS
Assessment   1  Left nephrolithiasis (592 0) (N20 0)  2  Hyperlipidemia (272 4) (E78 5)  3  Benign essential hypertension (401 1) (I10)  4  Morbid or severe obesity due to excess calories (278 01) (E66 01)  5  Type 2 diabetes mellitus (250 00) (E11 9)  6  Constipation due to opioid therapy (564 09,E935 2) (K59 09,T40 2X5A)    Plan  Type 2 diabetes mellitus    · (1) CBC/ PLT (NO DIFF); Status:Active; Requested for:14Nov2016;   Perform:[facility]; NDT:90ETF0030;WGNIFOX; For:Type 2 diabetes mellitus; Ordered By:Kamryn Chavez;   · (1) COMPREHENSIVE METABOLIC PANEL; Status:Active; Requested for:14Nov2016;   Perform:[facility]; YWT:16TFO9867;AOOEJJQ; For:Type 2 diabetes mellitus; Ordered By:Kamryn Chavez;   · (1) HEMOGLOBIN A1C; Status:Active; Requested for:14Nov2016;   Perform:[facility]; GKY:44KSE1777;DUNDSIV; For:Type 2 diabetes mellitus; Ordered By:Kamryn Chavez;   · (1) LIPID PANEL, FASTING; Status:Active; Requested for:14Nov2016;   Perform:[facility]; XUO:58GMW3818;FWSYYOP; For:Type 2 diabetes mellitus; Ordered By:Kamryn Chavez;   · (1) URIC ACID; Status:Active; Requested for:14Nov2016;   Perform:[facility]; HZT:42FZX0450;ORJFWVM; For:Type 2 diabetes mellitus; Ordered By:Kamryn Chavez;   · Begin a limited exercise program ; Status:Complete;   Done: 75GHB8730  Ordered; For:Type 2 diabetes mellitus; Ordered By:Kamryn Chavez;   · Follow a diabetic diet with 1500 calories ; Status:Complete;   Done: 74SVM1125  Ordered; For:Type 2 diabetes mellitus; Ordered By:Kamryn Chavez;   · Inspect your feet and legs daily if you have vascular disease ; Status:Complete;   Done:  63HGT6926  Ordered; For:Type 2 diabetes mellitus; Ordered By:Kamryn Chavez;   · It is important to take good care of your feet if you have diabetes ; Status:Complete;    Done: 83MSU1937  Ordered; For:Type 2 diabetes mellitus;  Ordered By:Kamryn Chavez;   · Limit your use of alcohol to 2 drinks or cans of beer a day ; Status:Complete;   Done:  40QJM3153  Ordered; For:Type 2 diabetes mellitus; Ordered By:Kamryn Chavez;   · Restrict the salt in your diet by avoiding highly salted foods ; Status:Complete;   Done:  07OVP8749  Ordered; For:Type 2 diabetes mellitus; Ordered By:Kamryn Chavez;   · Restrict your sodium (salt) intake to 2 grams per day ; Status:Complete;   Done:  03JFZ5820  Ordered; For:Type 2 diabetes mellitus; Ordered By:Kamryn Chavez;   · Some eating tips that can help you lose weight ; Status:Complete;   Done: 67ZJP7339  Ordered; For:Type 2 diabetes mellitus; Ordered By:Kamryn Chavez;   · Start eating more fiber ; Status:Complete;   Done: 52IHO8800  Ordered; For:Type 2 diabetes mellitus; Ordered By:Kamryn Chavez;   · There are many exercise options for seniors ; Status:Complete;   Done: 50WEX0135  Ordered; For:Type 2 diabetes mellitus; Ordered By:Kamryn Chavez;   · There are many exercise options for seniors ; Status:Complete;   Done: 43CZE6105  Ordered; For:Type 2 diabetes mellitus; Ordered By:Kamryn Chavez;   · We recommend that you change your eating habits slowly ; Status:Complete;   Done:  87JAO9095  Ordered; For:Type 2 diabetes mellitus; Ordered By:Kamryn Chavez;    Discussion/Summary  Discussion Summary:   Recent renal colic and treated for left ureteral stone, stent is removed, he finished Cipro,  He is trying to drink plenty of water,  He has constipation due to oxycodone and hydrocodone use, which was given through hospital for severe pain as he was passing a kidney stone, he is taking Colace, advised to take May relax one time and then continue Colace, and avoid narcotics, instead he can take Aleve or ibuprofen as needed only, his pain is much better now    We will have follow-up with urologist Dr Cristin Montiel,  And he will get a x-ray KUB before he see him,  Urine culture is negative,  Diabetes, is stable, continue same medications  Hypertension is stable,  His uric acid is slightly high, continue allopurinol same dose, as he has recent impairment in his kidney function in the hospital, I will not increase his allopurinol, may increase after next labs,  Advised to have low purine diet, low-salt diet, and 1800-calorie diabetic diet  He will have labs and follow-up here in 3 months  Counseling Documentation With Imm: The patient was counseled regarding diagnostic results, instructions for management, risk factor reductions, prognosis, impressions, risks and benefits of treatment options, importance of compliance with treatment  Medication SE Review and Pt Understands Tx: Possible side effects of new medications were reviewed with the patient/guardian today  The treatment plan was reviewed with the patient/guardian  The patient/guardian understands and agrees with the treatment plan      Chief Complaint  Chief Complaint Free Text Note Form: bernardo      History of Present Illness  TCM Communication  Luke: The patient is being contacted for follow-up after hospitalization  He was hospitalized at [facility]  The dates of hospitalization: 08/10/2016-08/13/2016, date of admission: 08/10/2016, date of discharge: 08/13/2016  Diagnosis: Nephrolithiasis  He was discharged to home  Medications reviewed and updated today  He scheduled a follow up appointment  The patient is currently asymptomatic  Counseling was provided to the patient     Communication performed and completed by Yuval Pelayo   HPI: He was admitted in [facility], for left-sided urethral stone, he had a stent placed, and he was started on Cipro, he finishes antibiotically take and stent has been removed,  He says initially he had severe left-sided back pain and he went to the [facility] ER, and was told he has 6 mm stone in the left ureter which will pass, and was sent home with oxycodone,  Later his pain continued and he went to [facility], and again CAT scan was done and was told he has 8 mm stone, and the left ureter proximal part, and he had mild left hydronephrosis,  So he was treated in the hospital with fluids pain medicine and stent was placed in the left ureter, and Cipro was given,  His CAT scan shows bilateral nephrolithiasis,  He says his pain is gradually getting better and oxycodone and hydrocodone was not working very well for the pain now he start taking Aleve which works better, he has slight elevated kidney function in the hospital which got better,  He has no recent gout attack, he is diabetic and hypertension, his metformin was stopped in the hospital and later he was restarted,  He complains of constipation now,      Review of Systems  Complete-Male:   Constitutional: No fever or chills, feels well, no tiredness, no recent weight gain or weight loss  Eyes: No complaints of eye pain, no red eyes, no discharge from eyes, no itchy eyes  ENT: no complaints of earache, no hearing loss, no nosebleeds, no nasal discharge, no sore throat, no hoarseness  Cardiovascular: No complaints of slow heart rate, no fast heart rate, no chest pain, no palpitations, no leg claudication, no lower extremity  Respiratory: No complaints of shortness of breath, no wheezing, no cough, no SOB on exertion, no orthopnea or PND  Gastrointestinal: No complaints of abdominal pain, no constipation, no nausea or vomiting, no diarrhea or bloody stools  Genitourinary: No complaints of dysuria, no incontinence, no hesitancy, no nocturia, no genital lesion, no testicular pain  Musculoskeletal: No complaints of arthralgia, no myalgias, no joint swelling or stiffness, no limb pain or swelling  Integumentary: No complaints of skin rash or skin lesions, no itching, no skin wound, no dry skin  Neurological: No compliants of headache, no confusion, no convulsions, no numbness or tingling, no dizziness or fainting, no limb weakness, no difficulty walking     Psychiatric: Is not suicidal, no sleep disturbances, no anxiety or depression, no change in personality, no emotional problems  Endocrine: No complaints of proptosis, no hot flashes, no muscle weakness, no erectile dysfunction, no deepening of the voice, no feelings of weakness  Hematologic/Lymphatic: No complaints of swollen glands, no swollen glands in the neck, does not bleed easily, no easy bruising  ROS Reviewed:   ROS reviewed  Active Problems   1  Benign essential hypertension (401 1) (I10)  2  Callus (700) (L84)  3  Carpal tunnel syndrome, bilateral (354 0) (G56 01,G56 02)  4  Colonoscopy (Fiberoptic) Screening  5  Gout (274 9) (M10 9)  6  Hyperkalemia (276 7) (E87 5)  7  Hyperlipidemia (272 4) (E78 5)  8  Laceration of face, multiple sites (873 49) (S01 81XA)  9  Leukocytosis (288 60) (D72 829)  10  Morbid or severe obesity due to excess calories (278 01) (E66 01)  11  Need for pneumococcal vaccination (V03 82) (Z23)  12  Plantar callus (700) (L84)  13  Type 2 diabetes mellitus (250 00) (E11 9)    Past Medical History   1  History of _  2  History of _  3  History of _  4  History of Gout (274 9) (M10 9)  5  History of leukocytosis (V12 3) (Z86 2)  6  History of onychomycosis (V12 09) (Z86 19)  7  History of Other muscle spasm (728 85) (M62 838)  8  History of Shoulder joint pain, unspecified laterality    Surgical History   1  History of Femoral Hernia Repair Unilateral  2  History of Neuroplasty Median Nerve At Carpal Tunnel  Surgical History Reviewed: The surgical history was reviewed and updated today  Family History  Mother   1  Family history of Coronary Artery Disease (V17 49)  Father   2  Family history of Heart Disease (V17 49)  3  Family history of Lung Cancer (V16 1)  Family History Reviewed: The family history was reviewed and updated today  1        1 Amended By: Adele Roe;  Aug 18 2016 11:50 AM EST    Social History    · Living Independently Alone (V60 3)   · Marital History - Single   · Never A Smoker   · Never Drank Alcohol  Social History Reviewed: The social history was reviewed and updated today  Current Meds  1  Allopurinol 100 MG Oral Tablet; TAKE 1 TABLET BY MOUTH ONCE A DAY; Therapy: 03REJ4164 to (Evaluate:54Tfa5152)  Requested for: 28Jun2016; Last   Rx:28Jun2016 Ordered  2  Cinnamon TABS; Therapy: (Recorded:00Uii9827) to Recorded  3  Ciprofloxacin HCl - 500 MG Oral Tablet; TAKE 1 TABLET TWICE DAILY; Therapy: 32CIA8350 to (Evaluate:79Hwv2238) Recorded  4  Docusate Sodium 100 MG Oral Capsule; TAKE 1 CAPSULE TWICE DAILY; Therapy: 93ZMA7673 to (Evaluate:16Nov2016) Recorded  5  Hydrocodone-Acetaminophen 5-325 MG Oral Tablet; TAKE 1 TO 2 TABLETS EVERY 4 TO   6 HOURS AS NEEDED FOR PAIN  NO MORE THAN 8 TABLETS PER DAY; Therapy: 48USO1244 to (Evaluate:70Brb1460) Recorded  6  Januvia 100 MG Oral Tablet; take 1 tablet by mouth every morning; Therapy: 37BSH1547 to (Maria Luisa Delvalle)  Requested for: 56DTP9881; Last   Rx:07Jun2016 Ordered  7  Lisinopril 10 MG Oral Tablet; TAKE 1 TABLET BY MOUTH ONCE A DAY TO PROTECT   DIABETIC KIDNEY; Therapy: 12HPJ8693 to (Evaluate:15Ice4606)  Requested for: 28Jun2016; Last   Rx:28Jun2016 Ordered  8  MetFORMIN HCl - 1000 MG Oral Tablet; TAKE 1 TABLET BY MOUTH EVERY MORNING   AND 1 TABLET BEFORE SUPPER; Therapy: 68WRU8298 to (Evaluate:13Iwa8820)  Requested for: 76IVE9019; Last   Rx:16Jun2016 Ordered  9  Multiple Vitamin TABS; Therapy: (Recorded:33Vnl5418) to Recorded  10  Oxycodone-Acetaminophen 5-325 MG Oral Tablet; TAKE 1 TABLET EVERY 4 TO 6    HOURS AS NEEDED FOR PAIN;    Therapy: 07IPL4194 to (Evaluate:37Bqv8420) Recorded  11  Simvastatin 10 MG Oral Tablet; take 1 tablet by mouth daily; Therapy: 93NDQ2333 to (Evaluate:10Aug2016)  Requested for: 22LWE6418; Last    Rx:69Cba5617 Ordered  12  Tamsulosin HCl - 0 4 MG Oral Capsule; take 1 capsule daily; Therapy: 11BBT2293 to (Evaluate:58Sjd6683) Recorded  Medication List Reviewed: The medication list was reviewed and updated today  Allergies   1  Penicillins    Vitals  Signs   Recorded: 19VCK5915 27:98FY   Systolic: 221  Diastolic: 70  Heart Rate: 88  Respiration: 16  Height: 5 ft 8 in  Weight: 278 lb   BMI Calculated: 42 27  BSA Calculated: 2 36    Physical Exam    Constitutional   General appearance: No acute distress, well appearing and well nourished  Eyes   Conjunctiva and lids: No swelling, erythema, or discharge  Pupils and irises: Equal, round and reactive to light  Ears, Nose, Mouth, and Throat   External inspection of ears and nose: Normal     Nasal mucosa, septum, and turbinates: Normal without edema or erythema  Oropharynx: Normal with no erythema, edema, exudate or lesions  Pulmonary   Respiratory effort: No increased work of breathing or signs of respiratory distress  Auscultation of lungs: Clear to auscultation, equal breath sounds bilaterally, no wheezes, no rales, no rhonci  Cardiovascular   Palpation of heart: Normal PMI, no thrills  Auscultation of heart: Normal rate and rhythm, normal S1 and S2, without murmurs  Examination of extremities for edema and/or varicosities: Abnormal   bilateral pretibial 1+ pitting edema  Abdomen   Abdomen: Non-tender, no masses  Liver and spleen: No hepatomegaly or splenomegaly  Lymphatic   Palpation of lymph nodes in neck: No lymphadenopathy  Musculoskeletal   Gait and station: Normal     Inspection/palpation of joints, bones, and muscles: Normal     Skin   Skin and subcutaneous tissue: Normal without rashes or lesions  Neurologic   Cranial nerves: Cranial nerves 2-12 intact      Psychiatric   Orientation to person, place and time: Normal          Results/Data  (1) URINE CULTURE 21Csw5410 05:21PM Ashok Rothman     Test Name Result Flag Reference   CLINICAL REPORT (Report)     Test:        Urine culture  Specimen Source:  Urine, Cystoscopic  Specimen Type:   Urine  Specimen Date:   8/11/2016 5:21 PM  Result Date:    8/13/2016 3:38 PM  Result Status:   Final result  Resulting Lab:   [facility] [address] [facility] [address]            Tel: [phone]      CULTURE                                       ------------------                                   No Growth <100 cfu/mL     (1) LIPID PANEL, FASTING 01Aug2016 12:00AM howsimple     Test Name Result Flag Reference   CHOLESTEROL, TOTAL 134 mg/dL  125-200   HDL CHOLESTEROL 34 mg/dL L > OR = 40   TRIGLICERIDES 862 mg/dL H <150   LDL-CHOLESTEROL 62 mg/dL (calc)  <130   Desirable range <100 mg/dL for patients with CHD or  diabetes and <70 mg/dL for diabetic patients with  known heart disease  CHOL/HDLC RATIO 3 9 (calc)  < OR = 5 0   NON HDL CHOLESTEROL 100 mg/dL (calc)     Target for non-HDL cholesterol is 30 mg/dL higher than   LDL cholesterol target  (1) URIC ACID 01Aug2016 12:00AM howsimple     Test Name Result Flag Reference   URIC ACID 7 2 mg/dL  4 0-8 0   Therapeutic target for gout patients: <6 0 mg/dL     (1) COMPREHENSIVE METABOLIC PANEL 95QLK7631 94:11ZR howsimple     Test Name Result Flag Reference   GLUCOSE 115 mg/dL H 65-99   Fasting reference interval   UREA NITROGEN (BUN) 23 mg/dL  7-25   CREATININE 1 18 mg/dL  0 70-1 25   For patients >52years of age, the reference limit  for Creatinine is approximately 13% higher for people  identified as -American  eGFR NON-AFR   AMERICAN 67 mL/min/1 73m2  > OR = 60   eGFR AFRICAN AMERICAN 77 mL/min/1 73m2  > OR = 60   BUN/CREATININE RATIO   7-10   NOT APPLICABLE (calc)   SODIUM 137 mmol/L  135-146   POTASSIUM 4 8 mmol/L  3 5-5 3   CHLORIDE 102 mmol/L     CARBON DIOXIDE 25 mmol/L  20-31   CALCIUM 9 0 mg/dL  8 6-10 3   PROTEIN, TOTAL 6 3 g/dL  6 1-8 1   ALBUMIN 3 9 g/dL  3 6-5 1   GLOBULIN 2 4 g/dL (calc)  1 9-3 7   ALBUMIN/GLOBULIN RATIO 1 6 (calc)  1 0-2 5   BILIRUBIN, TOTAL 0 4 mg/dL  0 2-1 2   ALKALINE PHOSPHATASE 71 U/L     AST 13 U/L  10-35   ALT 13 U/L  9-46     (Q) CBC (H/H, RBC, INDICES, WBC, PLT) 49IID5040 12:00AM Carlene Smoker     Test Name Result Flag Reference   WHITE BLOOD CELL COUNT 9 6 Thousand/uL  3 8-10 8   RED BLOOD CELL COUNT 4 16 Million/uL L 4 20-5 80   HEMOGLOBIN 12 0 g/dL L 13 2-17 1   HEMATOCRIT 37 3 % L 38 5-50 0   MCV 89 6 fL  80 0-100 0   MCH 28 8 pg  27 0-33 0   MCHC 32 1 g/dL  32 0-36 0   RDW 15 1 % H 11 0-15 0   PLATELET COUNT 182 Thousand/uL  140-400   MPV 10 5 fL  7 5-11 5     (Q) TSH, 3RD GENERATION 01Aug2016 12:00AM Carlene Smoker     Test Name Result Flag Reference   TSH 2 28 mIU/L  0 40-4 50     (Q) HEMOGLOBIN A1c 01Aug2016 12:00AM Carlene Smoker     Test Name Result Flag Reference   HEMOGLOBIN A1c 6 4 % of total Hgb H <5 7   According to ADA guidelines, hemoglobin A1c <7 0%  represents optimal control in non-pregnant diabetic  patients  Different metrics may apply to specific  patient populations  Standards of Medical Care in    Diabetes Care  2013;36:s11-s66     For the purpose of screening for the presence of  diabetes  <5 7%       Consistent with the absence of diabetes  5 7-6 4%    Consistent with increased risk for diabetes              (prediabetes)  >or=6 5%    Consistent with diabetes     This assay result is consistent with a higher risk  of diabetes  Currently, no consensus exists for use of hemoglobin  A1c for diagnosis of diabetes for children  Health Management  Colonoscopy (Fiberoptic) Screening   COLONOSCOPY; every 2 years; Last 20Aug2013; Next Due: 55Nce5826; Overdue  Type 2 diabetes mellitus   (1) HEMOGLOBIN A1C; every 3 months; Last 01Aug2016; Next Due: 13UTM1431; Overdue  *VB - Eye Exam; every 1 year; Last 86RVO2673; Next Due: 91KBA6716; Near Due  *VB - Foot Exam; every 1 year; Last 30Apr2015; Next Due: 60Mlj6567;  Overdue    Future Appointments    Date/Time Provider Specialty Site   09/27/2016 01:30 PM Kirstin Webb, [address] [facility]rology [facility] Maynor Aguero     Signatures   Electronically signed by : Jenny Shanks, ; Aug 15 2016  9:28AM EST                       (Author)    Electronically signed by : ROSALIE Lai ; Aug 18 2016  9:47AM EST                       (Author)    Electronically signed by : ROSALIE Lai ; Aug 18 2016 11:51AM EST                       (Author)

## 2018-01-16 ENCOUNTER — APPOINTMENT (OUTPATIENT)
Dept: LAB | Facility: CLINIC | Age: 63
End: 2018-01-16
Payer: COMMERCIAL

## 2018-01-16 ENCOUNTER — GENERIC CONVERSION - ENCOUNTER (OUTPATIENT)
Dept: OTHER | Facility: OTHER | Age: 63
End: 2018-01-16

## 2018-01-16 ENCOUNTER — TRANSCRIBE ORDERS (OUTPATIENT)
Dept: LAB | Facility: CLINIC | Age: 63
End: 2018-01-16

## 2018-01-16 DIAGNOSIS — E11.9 TYPE 2 DIABETES MELLITUS WITHOUT COMPLICATIONS (HCC): ICD-10-CM

## 2018-01-16 LAB
ALBUMIN SERPL BCP-MCNC: 3.3 G/DL (ref 3.5–5)
ALP SERPL-CCNC: 85 U/L (ref 46–116)
ALT SERPL W P-5'-P-CCNC: 54 U/L (ref 12–78)
ANION GAP SERPL CALCULATED.3IONS-SCNC: 8 MMOL/L (ref 4–13)
AST SERPL W P-5'-P-CCNC: 33 U/L (ref 5–45)
BILIRUB SERPL-MCNC: 0.4 MG/DL (ref 0.2–1)
BUN SERPL-MCNC: 24 MG/DL (ref 5–25)
CALCIUM SERPL-MCNC: 9 MG/DL (ref 8.3–10.1)
CHLORIDE SERPL-SCNC: 100 MMOL/L (ref 100–108)
CHOLEST SERPL-MCNC: 139 MG/DL (ref 50–200)
CO2 SERPL-SCNC: 27 MMOL/L (ref 21–32)
CREAT SERPL-MCNC: 1.41 MG/DL (ref 0.6–1.3)
CREAT UR-MCNC: 58.8 MG/DL
ERYTHROCYTE [DISTWIDTH] IN BLOOD BY AUTOMATED COUNT: 14.9 % (ref 11.6–15.1)
EST. AVERAGE GLUCOSE BLD GHB EST-MCNC: 163 MG/DL
GFR SERPL CREATININE-BSD FRML MDRD: 53 ML/MIN/1.73SQ M
GLUCOSE P FAST SERPL-MCNC: 158 MG/DL (ref 65–99)
HBA1C MFR BLD: 7.3 % (ref 4.2–6.3)
HCT VFR BLD AUTO: 42.3 % (ref 36.5–49.3)
HDLC SERPL-MCNC: 42 MG/DL (ref 40–60)
HGB BLD-MCNC: 13.1 G/DL (ref 12–17)
LDLC SERPL CALC-MCNC: 57 MG/DL (ref 0–100)
MCH RBC QN AUTO: 27.4 PG (ref 26.8–34.3)
MCHC RBC AUTO-ENTMCNC: 31 G/DL (ref 31.4–37.4)
MCV RBC AUTO: 89 FL (ref 82–98)
MICROALBUMIN UR-MCNC: 15.2 MG/L (ref 0–20)
MICROALBUMIN/CREAT 24H UR: 26 MG/G CREATININE (ref 0–30)
PLATELET # BLD AUTO: 202 THOUSANDS/UL (ref 149–390)
PMV BLD AUTO: 11.9 FL (ref 8.9–12.7)
POTASSIUM SERPL-SCNC: 4.8 MMOL/L (ref 3.5–5.3)
PROT SERPL-MCNC: 7.1 G/DL (ref 6.4–8.2)
RBC # BLD AUTO: 4.78 MILLION/UL (ref 3.88–5.62)
SODIUM SERPL-SCNC: 135 MMOL/L (ref 136–145)
TRIGL SERPL-MCNC: 199 MG/DL
TSH SERPL DL<=0.05 MIU/L-ACNC: 3.18 UIU/ML (ref 0.36–3.74)
WBC # BLD AUTO: 9.78 THOUSAND/UL (ref 4.31–10.16)

## 2018-01-16 PROCEDURE — 80053 COMPREHEN METABOLIC PANEL: CPT

## 2018-01-16 PROCEDURE — 36415 COLL VENOUS BLD VENIPUNCTURE: CPT

## 2018-01-16 PROCEDURE — 82570 ASSAY OF URINE CREATININE: CPT

## 2018-01-16 PROCEDURE — 82043 UR ALBUMIN QUANTITATIVE: CPT

## 2018-01-16 PROCEDURE — 83036 HEMOGLOBIN GLYCOSYLATED A1C: CPT

## 2018-01-16 PROCEDURE — 85027 COMPLETE CBC AUTOMATED: CPT

## 2018-01-16 PROCEDURE — 84443 ASSAY THYROID STIM HORMONE: CPT

## 2018-01-16 PROCEDURE — 80061 LIPID PANEL: CPT

## 2018-01-22 ENCOUNTER — ALLSCRIPTS OFFICE VISIT (OUTPATIENT)
Dept: OTHER | Facility: OTHER | Age: 63
End: 2018-01-22

## 2018-01-22 VITALS
SYSTOLIC BLOOD PRESSURE: 110 MMHG | BODY MASS INDEX: 41.66 KG/M2 | WEIGHT: 291 LBS | HEART RATE: 80 BPM | HEIGHT: 70 IN | DIASTOLIC BLOOD PRESSURE: 82 MMHG

## 2018-01-22 VITALS
DIASTOLIC BLOOD PRESSURE: 70 MMHG | RESPIRATION RATE: 16 BRPM | HEART RATE: 70 BPM | BODY MASS INDEX: 40.8 KG/M2 | SYSTOLIC BLOOD PRESSURE: 110 MMHG | WEIGHT: 285 LBS | HEIGHT: 70 IN

## 2018-01-23 NOTE — RESULT NOTES
Verified Results  (1) CBC/ PLT (NO DIFF) 62LSK8735 09:42AM BuildOut Order Number: [id number]     Test Name Result Flag Reference   HEMATOCRIT 42 3 %  36 5-49 3   HEMOGLOBIN 13 1 g/dL  12 0-17 0   MCHC 31 0 g/dL L 31 4-37 4   MCH 27 4 pg  26 8-34 3   MCV 89 fL  82-98   PLATELET COUNT 266 Thousands/uL  149-390   RBC COUNT 4 78 Million/uL  3 88-5 62   RDW 14 9 %  11 6-15 1   WBC COUNT 9 78 Thousand/uL  4 31-10 16   MPV 11 9 fL  8 9-12 7     (1) COMPREHENSIVE METABOLIC PANEL 01TKH9843 03:62IQ BuildOut Order Number: [id number]     Test Name Result Flag Reference   SODIUM 135 mmol/L L 136-145   POTASSIUM 4 8 mmol/L  3 5-5 3   CHLORIDE 100 mmol/L  100-108   CARBON DIOXIDE 27 mmol/L  21-32   ANION GAP (CALC) 8 mmol/L  4-13   BLOOD UREA NITROGEN 24 mg/dL  5-25   CREATININE 1 41 mg/dL H 0 60-1 30   Standardized to IDMS reference method   CALCIUM 9 0 mg/dL  8 3-10 1   BILI, TOTAL 0 40 mg/dL  0 20-1 00   ALK PHOSPHATAS 85 U/L     ALT (SGPT) 54 U/L  12-78   Specimen collection should occur prior to Sulfasalazine administration due to the potential for falsely depressed results  AST(SGOT) 33 U/L  5-45   Specimen collection should occur prior to Sulfasalazine administration due to the potential for falsely depressed results  ALBUMIN 3 3 g/dL L 3 5-5 0   TOTAL PROTEIN 7 1 g/dL  6 4-8 2   eGFR 53 ml/min/1 73sq m     [facility] Disease Education Program recommendations are as follows:  GFR calculation is accurate only with a steady state creatinine  Chronic Kidney disease less than 60 ml/min/1 73 sq  meters  Kidney failure less than 15 ml/min/1 73 sq  meters  GLUCOSE FASTING 158 mg/dL H 65-99   Specimen collection should occur prior to Sulfasalazine administration due to the potential for falsely depressed results  Specimen collection should occur prior to Sulfapyridine administration due to the potential for falsely elevated results       (1) HEMOGLOBIN A1C 68LFD6160 09:42AM Genie Muse Order Number: [id number]     Test Name Result Flag Reference   HEMOGLOBIN A1C 7 3 % H 4 2-6 3   EST  AVG  GLUCOSE 163 mg/dl       (1) LIPID PANEL, FASTING 38SBI1749 09:42AM Genie Muse Order Number: [id number]     Test Name Result Flag Reference   CHOLESTEROL 139 mg/dL     Cholesterol:    Desirable <200 mg/dl    Borderline 200-239 mg/dl    High>239   HDL,DIRECT 42 mg/dL  40-60   HDL Cholesterol:    High>59 mg/dL    Low <41 mg/dL   LDL CHOLESTEROL CALCULATED 57 mg/dL  0-100   This screening LDL is a calculated result  It does not have the accuracy of the Direct Measured LDL in the monitoring of patients with hyperlipidemia and/or statin therapy  Direct Measure LDL (YKT202) must be ordered separately in these patients  Triglyceride:        Normal <150 mg/dl   Borderline High 150-199 mg/dl   High 200-499 mg/dl   Very High >499 mg/dl   TRIGLYCERIDES 199 mg/dL H <=150   Specimen collection should occur prior to N-Acetylcysteine or Metamizole administration due to the potential for falsely depressed results  (1) MICROALBUMIN CREATININE RATIO, RANDOM URINE 16Jan2018 09:42AM Genie Muse Order Number: [id number]     Test Name Result Flag Reference   MICROALBUMIN/ CREAT R 26 mg/g creatinine  0-30   MICROALBUMIN,URINE 15 2 mg/L  0 0-20 0   CREATININE URINE 58 8 mg/dL       (1) TSH 74OMY3317 09:42AM Genie Oxford Order Number: [id number]     Test Name Result Flag Reference   TSH 3 178 uIU/mL  0 358-3 740   Patients undergoing fluorescein dye angiography may retain small amounts of fluorescein in the body for 48-72 hours post procedure  Samples containing fluorescein can produce falsely depressed TSH values  If the patient had this procedure,a specimen should be resubmitted post fluorescein clearance  Plan  Type 2 diabetes mellitus    · (1) HEMOGLOBIN A1C ; every 3 months; Last 36DZK0580;  Next 84AWW9510;  Status:Active

## 2018-01-23 NOTE — PROGRESS NOTES
Assessment   1  Type 2 diabetes mellitus (250 00) (E11 9)  2  Hyperlipidemia (272 4) (E78 5)  3  Family history of malignant neoplasm of stomach (V16 0) (Z80 0) : Brother  4  Benign essential hypertension (401 1) (I10)  5  Morbid or severe obesity due to excess calories (278 01) (E66 01)  6  Tinea pedis of left foot (110 4) (B35 3)  7  Onychomycosis of toenail (110 1) (B35 1)1      1 Amended By: Madeleine Caruso; Jan 22 2018 10:55 AM EST      Plan   Type 2 diabetes mellitus    · Start: GlipiZIDE 5 MG Oral Tablet; take 1 tablet by mouth every day   · Start: Tradjenta 5 MG Oral Tablet; TAKE 1 TABLET DAILY   · Begin a limited exercise program ; Status:Complete;   Done: 41JZG5882   · Cut your nails straight across ; Status:Complete;   Done: 75ORM5469   · Inspect your feet and legs daily if you have vascular disease ; Status:Complete;   Done:    14QRV7088   · Restrict the salt in your diet by avoiding highly salted foods ; Status:Complete;   Done:    77VCP3154   · Restrict your sodium (salt) intake to 2 grams per day ; Status:Complete;   Done:    10CCQ6794   · Some eating tips that can help you lose weight ; Status:Complete;   Done: 52REJ5816   · Start eating more fiber ; Status:Complete;   Done: 87BKQ4501   · There are many exercise options for seniors ; Status:Complete;   Done: 94XVH0413   · We recommend that you bring your body mass index down to 26 ; Status:Complete;      Done: 29LUM2780   · Wear shoes that give your toes plenty of room ; Status:Complete;   Done: 04TTH7086   · (1) CBC/ PLT (NO DIFF); Status:Active; Requested for:20Apr2018;    · (1) COMPREHENSIVE METABOLIC PANEL; Status:Active; Requested for:20Apr2018;    · (1) HEMOGLOBIN A1C; Status:Active; Requested for:20Apr2018;    · (1) LIPID PANEL, FASTING; Status:Active; Requested for:20Apr2018;    · *VB - Foot Exam; Status:Resulted - Requires Verification;   Done: 03LFV8544 10:52AM   · *VB - Foot Exam ; every 1 year; Last 78PFD0299;  Next 91CVA0560; Status:Active    Discussion/Summary      He cannot afford Januvia, so he has not been taking it and his A1c has increased diabetes uncontrolled, metformin and I will add Tradjenta and glipizide and will see if that is covered by the insurance advised to continue monitoring his blood sugar, and if he has any signs of hypoglycemia which were discussed with him he should inform the doctor  has been stable, he needs to lose weight and have repeat labs in few months and follow up to use Lotrimin cream for his left foot and he needs to see podiatrist as his toenails are also thick and yellow,1     The patient was counseled regarding instructions for management,-- risk factor reductions,-- prognosis,-- impressions,-- risks and benefits of treatment options,-- importance of compliance with treatment  Possible side effects of new medications were reviewed with the patient/guardian today  The treatment plan was reviewed with the patient/guardian  The patient/guardian understands and agrees with the treatment plan       1 Amended By: Jaron Rodriguez; Jan 22 2018 10:54 AM EST      Chief Complaint   FOLLOW UP, REVIEW LABS      History of Present Illness   He is here follow-up on diabetes and he says he is not taking Januvia because it was very costly so for more than an months he has been not taking it, but he has been taking metformin twice a day, he says his diet is always good but he is unable to lose weight he has hypertension he takes lisinopril   for renal stone he is on allopurinol, and he follows urologist does fair amount of activity but not regular exercise      Review of Systems        Constitutional: No fever or chills, feels well, no tiredness, no recent weight gain or weight loss  Eyes: No complaints of eye pain, no red eyes, no discharge from eyes, no itchy eyes  ENT: no complaints of earache, no hearing loss, no nosebleeds, no nasal discharge, no sore throat, no hoarseness        Cardiovascular: No complaints of slow heart rate, no fast heart rate, no chest pain, no palpitations, no leg claudication, no lower extremity  Respiratory: No complaints of shortness of breath, no wheezing, no cough, no SOB on exertion, no orthopnea or PND  Gastrointestinal: No complaints of abdominal pain, no constipation, no nausea or vomiting, no diarrhea or bloody stools  Genitourinary: No complaints of dysuria, no incontinence, no hesitancy, no nocturia, no genital lesion, no testicular pain  Musculoskeletal: No complaints of arthralgia, no myalgias, no joint swelling or stiffness, no limb pain or swelling  Integumentary: No complaints of skin rash or skin lesions, no itching, no skin wound, no dry skin  Neurological: No compliants of headache, no confusion, no convulsions, no numbness or tingling, no dizziness or fainting, no limb weakness, no difficulty walking  Psychiatric: Is not suicidal, no sleep disturbances, no anxiety or depression, no change in personality, no emotional problems  Endocrine: No complaints of proptosis, no hot flashes, no muscle weakness, no erectile dysfunction, no deepening of the voice, no feelings of weakness  Hematologic/Lymphatic: No complaints of swollen glands, no swollen glands in the neck, does not bleed easily, no easy bruising  ROS reviewed  Active Problems   1  Carpal tunnel syndrome, bilateral (354 0) (G56 03)  2  Colonoscopy (Fiberoptic) Screening  3  Encounter for prostate cancer screening (V76 44) (Z12 5)  4  Laceration of face, multiple sites (873 49) (S01 81XA)  5  Left nephrolithiasis (592 0) (N20 0)  6  Need for pneumococcal vaccination (V03 82) (Z23)  7  Nephrolithiasis (592 0) (N20 0)  8  Plantar callus (700) (L84)  9  Renal insufficiency (593 9) (N28 9)    Past Medical History   1  History of _  2  History of _  3  History of _  4  History of Gout (274 9) (M10 9)  5  History of leukocytosis (V12 3) (Z86 2)  6   History of onychomycosis (V12 09) (Z86 19)  7  History of Other muscle spasm (728 85) (M62 838)  8  History of Shoulder joint pain, unspecified laterality     The active problems and past medical history were reviewed and updated today  Surgical History   1  History of Femoral Hernia Repair Unilateral  2  History of Neuroplasty Median Nerve At Carpal Tunnel     The surgical history was reviewed and updated today  Family History   Mother   1  Family history of Coronary Artery Disease (V17 49)  Father   2  Family history of Heart Disease (V17 49)  3  Family history of Lung Cancer (V16 1)  Brother   4  Family history of malignant neoplasm of stomach (V16 0) (Z80 0)     The family history was reviewed and updated today  Social History    · Living Independently Alone (V60 3)   · Marital History - Single   · Never A Smoker   · Never Drank Alcohol  The social history was reviewed and updated today  Current Meds   1  Allopurinol 100 MG Oral Tablet; take 1 tablet by mouth every day; Therapy: 60XXX9360 to (Mckenna Estrada)  Requested for: 62KNY9380; Last     Rx:03Jan2018 Ordered  2  Calcium Citrate 200 MG Oral Tablet; Take 2 tablets PO BID; Therapy: 95VHX3293 to (Evaluate:06Jun2018); Last Rx:08Nov2017 Ordered  3  Cinnamon TABS; Therapy: (Recorded:58Oqs7914) to Recorded  4  Lisinopril 10 MG Oral Tablet; TAKE 1 TABLET BY MOUTH ONCE A DAY TO PROTECT     DIABETIC KIDNEY; Therapy: 69QHG4115 to (Mckenna Buenoing)  Requested for: 28KFV5806; Last     Rx:03Jan2018 Ordered  5  MetFORMIN HCl - 1000 MG Oral Tablet; TAKE 1 TABLET BY MOUTH EVERY MORNING     AND TAKE 1 TABLET BY MOUTH BEFORE SUPPER; Therapy: 81CSY7198 to (Evaluate:14Jun2018)  Requested for: 02OMH5259; Last     Rx:01Tje7759 Ordered  6  Metoprolol Tartrate 25 MG Oral Tablet; Take 1 tablet twice daily; Therapy: 79MBZ7543 to (Makinen Skains)  Requested for: 65WQL2016; Last     Rx:06Nov2017 Ordered  7   Multiple Vitamin TABS; Therapy: (Recorded:40Liv1990) to Recorded  8  Simvastatin 10 MG Oral Tablet; TAKE 1 TABLET BY MOUTH ONCE A DAY; Therapy: 41DHY3451 to (Evaluate:08Ist6258)  Requested for: 70SYY3955; Last     Rx:61Hzl5408 Ordered     The medication list was reviewed and updated today  Allergies   1  Penicillins    Vitals   Vital Signs    Recorded: 34EMC0624 08:08AM   Heart Rate 88   Respiration 16   Systolic 151   Diastolic 72   Height 5 ft 10 in   Weight 292 lb    BMI Calculated 41 9   BSA Calculated 2 45   O2 Saturation 98     Physical Exam        Constitutional      General appearance: No acute distress, well appearing and well nourished  Eyes      Conjunctiva and lids: No swelling, erythema, or discharge  Ears, Nose, Mouth, and Throat      External inspection of ears and nose: Normal        Oropharynx: Normal with no erythema, edema, exudate or lesions  Pulmonary      Respiratory effort: No increased work of breathing or signs of respiratory distress  Auscultation of lungs: Clear to auscultation, equal breath sounds bilaterally, no wheezes, no rales, no rhonci  Cardiovascular      Palpation of heart: Normal PMI, no thrills  Auscultation of heart: Normal rate and rhythm, normal S1 and S2, without murmurs  Examination of extremities for edema and/or varicosities: Abnormal   bilateral ankle 1+ pitting edema  Abdomen      Abdomen: Non-tender, no masses  Liver and spleen: No hepatomegaly or splenomegaly  Lymphatic      Palpation of lymph nodes in neck: No lymphadenopathy  Musculoskeletal      Gait and station: Normal        Digits and nails: Abnormal  -- thick yellow nails  Skin      Skin and subcutaneous tissue: Abnormal  -- slight whitish skin in between 4th and fifth toes left foot  Neurologic      Cranial nerves: Cranial nerves 2-12 intact  Sensation: No sensory loss         Psychiatric      Orientation to person, place and time: Normal  Mood and affect: Normal        Diabetic Foot Screen: Normal           Socks and shoes removed, Right Foot Findings: normal foot, no swelling, no erythema  The right toes were normal-- and-- had full range of motion  The sensory exam showed normal vibratory sensation at the level of the toes on the right  Normal tactile sensation with monofilament testing throughout the right foot  Socks and shoes removed, Left Foot Findings: normal foot, no swelling, no erythema  The left toes were normal-- and-- had full range of motion  The sensory exam showed normal vibratory sensation at the level of the toes on the left  Normal tactile sensation with monofilament testing throughout the left foot  Pulses:      2+ in the dorsalis pedis on the right  Pulses:      2+ in the dorsalis pedis on the left        Results/Data   (1) CBC/ PLT (NO DIFF) 59WBM7798 09:42AM Genie Muse Order Number: [id number]      Test Name Result Flag Reference   HEMATOCRIT 42 3 %  36 5-49 3   HEMOGLOBIN 13 1 g/dL  12 0-17 0   MCHC 31 0 g/dL L 31 4-37 4   MCH 27 4 pg  26 8-34 3   MCV 89 fL  82-98   PLATELET COUNT 958 Thousands/uL  149-390   RBC COUNT 4 78 Million/uL  3 88-5 62   RDW 14 9 %  11 6-15 1   WBC COUNT 9 78 Thousand/uL  4 31-10 16   MPV 11 9 fL  8 9-12 7      (1) COMPREHENSIVE METABOLIC PANEL 32GSI4714 95:01MJ Genie Guyton Order Number: [id number]      Test Name Result Flag Reference   SODIUM 135 mmol/L L 136-145   POTASSIUM 4 8 mmol/L  3 5-5 3   CHLORIDE 100 mmol/L  100-108   CARBON DIOXIDE 27 mmol/L  21-32   ANION GAP (CALC) 8 mmol/L  4-13   BLOOD UREA NITROGEN 24 mg/dL  5-25   CREATININE 1 41 mg/dL H 0 60-1 30   Standardized to IDMS reference method   CALCIUM 9 0 mg/dL  8 3-10 1   BILI, TOTAL 0 40 mg/dL  0 20-1 00   ALK PHOSPHATAS 85 U/L     ALT (SGPT) 54 U/L  12-78   Specimen collection should occur prior to Sulfasalazine administration due to the potential for falsely depressed results  AST(SGOT) 33 U/L  5-45   Specimen collection should occur prior to Sulfasalazine administration due to the potential for falsely depressed results  ALBUMIN 3 3 g/dL L 3 5-5 0   TOTAL PROTEIN 7 1 g/dL  6 4-8 2   eGFR 53 ml/min/1 73sq m     [facility] Disease Education Program recommendations are as follows:     GFR calculation is accurate only with a steady state creatinine     Chronic Kidney disease less than 60 ml/min/1 73 sq  meters     Kidney failure less than 15 ml/min/1 73 sq  meters  GLUCOSE FASTING 158 mg/dL H 65-99   Specimen collection should occur prior to Sulfasalazine administration due to the potential for falsely depressed results  Specimen collection should occur prior to Sulfapyridine administration due to the potential for falsely elevated results  (1) HEMOGLOBIN A1C 27TEZ3627 09:42AM Karina Delcid Order Number: [id number]      Test Name Result Flag Reference   HEMOGLOBIN A1C 7 3 % H 4 2-6 3   EST  AVG  GLUCOSE 163 mg/dl        (1) LIPID PANEL, FASTING 16HPV2013 09:42AM Karina Delcid Order Number: [id number]      Test Name Result Flag Reference   CHOLESTEROL 139 mg/dL     Cholesterol:       Desirable <200 mg/dl       Borderline 200-239 mg/dl       High>239   HDL,DIRECT 42 mg/dL  40-60   HDL Cholesterol:       High>59 mg/dL       Low <41 mg/dL   LDL CHOLESTEROL CALCULATED 57 mg/dL  0-100   This screening LDL is a calculated result  It does not have the accuracy of the Direct Measured LDL in the monitoring of patients with hyperlipidemia and/or statin therapy  Direct Measure LDL (TCX689) must be ordered separately in these patients                 Triglyceride:           Normal <150 mg/dl      Borderline High 150-199 mg/dl      High 200-499 mg/dl      Very High >499 mg/dl   TRIGLYCERIDES 199 mg/dL H <=150   Specimen collection should occur prior to N-Acetylcysteine or Metamizole administration due to the potential for falsely depressed results  (1) MICROALBUMIN CREATININE RATIO, RANDOM URINE 16Jan2018 09:42AM Jinx Leaver Order Number: [id number]      Test Name Result Flag Reference   MICROALBUMIN/ CREAT R 26 mg/g creatinine  0-30   MICROALBUMIN,URINE 15 2 mg/L  0 0-20 0   CREATININE URINE 58 8 mg/dL        (1) TSH 60ERY2958 09:42AM Jinx Leaver Order Number: [id number]      Test Name Result Flag Reference   TSH 3 178 uIU/mL  0 358-3 740   Patients undergoing fluorescein dye angiography may retain small amounts of fluorescein in the body for 48-72 hours post procedure  Samples containing fluorescein can produce falsely depressed TSH values  If the patient had this procedure,a specimen should be resubmitted post fluorescein clearance  Health Management   Colonoscopy (Fiberoptic) Screening   COLONOSCOPY; every 2 years; Last 53Mwm3463; Next Due: 63Vcf3150; Overdue  Type 2 diabetes mellitus   (1) HEMOGLOBIN A1C; every 3 months; Last 51OQQ4506; Next Due: 54PYN3713; Active  *VB - Eye Exam; every 1 year; Last 65UZK7718; Next Due: 93VWM1676; Overdue  *VB - Foot Exam; every 1 year; Last 90WSS0910; Next Due: 01RRU3141;  Active    Future Appointments      Date/Time Provider Specialty Site   05/09/2018 07:45 AM Krista Machado MD Urology [address]     Signatures    Electronically signed by : ROSALIE Durbin ; Jan 22 2018 10:54AM EST                       (Author)     Electronically signed by : ROSALIE Durbin ; Jan 22 2018 10:55AM EST                       (Author)

## 2018-02-22 DIAGNOSIS — E78.2 MIXED HYPERLIPIDEMIA: Primary | ICD-10-CM

## 2018-02-22 RX ORDER — SIMVASTATIN 10 MG
10 TABLET ORAL
Qty: 90 TABLET | Refills: 1 | Status: SHIPPED | OUTPATIENT
Start: 2018-02-22 | End: 2018-08-17 | Stop reason: SDUPTHER

## 2018-03-14 DIAGNOSIS — E08.9 DIABETES MELLITUS DUE TO UNDERLYING CONDITION WITHOUT COMPLICATION, WITHOUT LONG-TERM CURRENT USE OF INSULIN (HCC): Primary | ICD-10-CM

## 2018-03-14 RX ORDER — GLIPIZIDE 5 MG/1
5 TABLET ORAL DAILY
Qty: 90 TABLET | Refills: 1 | Status: SHIPPED | OUTPATIENT
Start: 2018-03-14 | End: 2018-09-20 | Stop reason: SDUPTHER

## 2018-03-14 RX ORDER — GLIPIZIDE 5 MG/1
1 TABLET ORAL DAILY
COMMUNITY
Start: 2018-01-22 | End: 2018-03-14 | Stop reason: SDUPTHER

## 2018-04-20 ENCOUNTER — LAB (OUTPATIENT)
Dept: LAB | Facility: CLINIC | Age: 63
End: 2018-04-20
Payer: COMMERCIAL

## 2018-04-20 DIAGNOSIS — E11.9 TYPE 2 DIABETES MELLITUS WITHOUT COMPLICATIONS (HCC): ICD-10-CM

## 2018-04-20 LAB
ALBUMIN SERPL BCP-MCNC: 3.5 G/DL (ref 3.5–5)
ALP SERPL-CCNC: 84 U/L (ref 46–116)
ALT SERPL W P-5'-P-CCNC: 38 U/L (ref 12–78)
ANION GAP SERPL CALCULATED.3IONS-SCNC: 11 MMOL/L (ref 4–13)
AST SERPL W P-5'-P-CCNC: 20 U/L (ref 5–45)
BILIRUB SERPL-MCNC: 0.2 MG/DL (ref 0.2–1)
BUN SERPL-MCNC: 39 MG/DL (ref 5–25)
CALCIUM SERPL-MCNC: 8.9 MG/DL (ref 8.3–10.1)
CHLORIDE SERPL-SCNC: 102 MMOL/L (ref 100–108)
CHOLEST SERPL-MCNC: 119 MG/DL (ref 50–200)
CO2 SERPL-SCNC: 24 MMOL/L (ref 21–32)
CREAT SERPL-MCNC: 1.73 MG/DL (ref 0.6–1.3)
ERYTHROCYTE [DISTWIDTH] IN BLOOD BY AUTOMATED COUNT: 14.9 % (ref 11.6–15.1)
EST. AVERAGE GLUCOSE BLD GHB EST-MCNC: 140 MG/DL
GFR SERPL CREATININE-BSD FRML MDRD: 41 ML/MIN/1.73SQ M
GLUCOSE P FAST SERPL-MCNC: 129 MG/DL (ref 65–99)
HBA1C MFR BLD: 6.5 % (ref 4.2–6.3)
HCT VFR BLD AUTO: 39.3 % (ref 36.5–49.3)
HDLC SERPL-MCNC: 41 MG/DL (ref 40–60)
HGB BLD-MCNC: 12.6 G/DL (ref 12–17)
LDLC SERPL CALC-MCNC: 52 MG/DL (ref 0–100)
MCH RBC QN AUTO: 28 PG (ref 26.8–34.3)
MCHC RBC AUTO-ENTMCNC: 32.1 G/DL (ref 31.4–37.4)
MCV RBC AUTO: 87 FL (ref 82–98)
NONHDLC SERPL-MCNC: 78 MG/DL
PLATELET # BLD AUTO: 202 THOUSANDS/UL (ref 149–390)
PMV BLD AUTO: 11 FL (ref 8.9–12.7)
POTASSIUM SERPL-SCNC: 4.5 MMOL/L (ref 3.5–5.3)
PROT SERPL-MCNC: 7.2 G/DL (ref 6.4–8.2)
RBC # BLD AUTO: 4.5 MILLION/UL (ref 3.88–5.62)
SODIUM SERPL-SCNC: 137 MMOL/L (ref 136–145)
TRIGL SERPL-MCNC: 131 MG/DL
WBC # BLD AUTO: 9.65 THOUSAND/UL (ref 4.31–10.16)

## 2018-04-20 PROCEDURE — 83036 HEMOGLOBIN GLYCOSYLATED A1C: CPT

## 2018-04-20 PROCEDURE — 80053 COMPREHEN METABOLIC PANEL: CPT

## 2018-04-20 PROCEDURE — 36415 COLL VENOUS BLD VENIPUNCTURE: CPT

## 2018-04-20 PROCEDURE — 85027 COMPLETE CBC AUTOMATED: CPT

## 2018-04-20 PROCEDURE — 80061 LIPID PANEL: CPT

## 2018-04-24 RX ORDER — MULTIVITAMIN
TABLET ORAL
COMMUNITY
End: 2018-04-24

## 2018-04-25 ENCOUNTER — OFFICE VISIT (OUTPATIENT)
Dept: FAMILY MEDICINE CLINIC | Facility: CLINIC | Age: 63
End: 2018-04-25
Payer: COMMERCIAL

## 2018-04-25 VITALS
HEART RATE: 78 BPM | RESPIRATION RATE: 18 BRPM | OXYGEN SATURATION: 98 % | DIASTOLIC BLOOD PRESSURE: 70 MMHG | HEIGHT: 70 IN | BODY MASS INDEX: 42.23 KG/M2 | SYSTOLIC BLOOD PRESSURE: 130 MMHG | WEIGHT: 295 LBS

## 2018-04-25 DIAGNOSIS — E11.9 TYPE 2 DIABETES MELLITUS WITHOUT COMPLICATION, WITHOUT LONG-TERM CURRENT USE OF INSULIN (HCC): Primary | ICD-10-CM

## 2018-04-25 DIAGNOSIS — M1A.9XX0 CHRONIC GOUT WITHOUT TOPHUS, UNSPECIFIED CAUSE, UNSPECIFIED SITE: ICD-10-CM

## 2018-04-25 PROCEDURE — 99214 OFFICE O/P EST MOD 30 MIN: CPT | Performed by: FAMILY MEDICINE

## 2018-04-25 NOTE — PROGRESS NOTES
Assessment/Plan:    Problem List Items Addressed This Visit        Endocrine    Diabetes ([facility], [place] [address] ) - Primary     Lab Results   Component Value Date    HGBA1C 6 5 (H) 04/20/2018     His A1c has significantly improved, he will continue metformin and glipizide, his blood sugar is better control, discussed with him to improve his water intake as his creatinine has increased 1 7 and his baseline has been 1 3-1 4, 1 month I will recheck his BMP and uric acid and if his creatinine does not improve with well hydration I will  just his medication  Follow-up 1 month         Relevant Orders    Basic metabolic panel       Other    Gout     Gout has been stable on allopurinol 100 mg and his last a uric acid was 6 5          Relevant Orders    Uric acid    BMI 40 0-44 9, adult [facility])          Chief Complaint   Patient presents with    Follow-up       Subjective:   Patient ID: Lena Sahu is a 58 y o  male  Follow-up of diabetes, he had labs and he says that Januvia was very expensive so he stopped taking that and instead he was given glipizide 5 mg, he says his blood sugars are much better now in the morning they are running close to 120 and he also is taking glipizide and metformin daily,  He has no gout attack in years, he is on low-dose allopurinol 100 mg daily, he says lately in  few days he drank very small amount of water, he thought he can lose weight by drinking less water,  Has history of kidney stone but no recent discomfort or urinary problem and he follows urologist   He takes his medicine for hyperlipidemia and for hypertension  He has mild renal insufficiency and baseline creatinine 1 3 to 1 4        Review of Systems   Constitutional: Negative for activity change, appetite change, chills, diaphoresis, fatigue, fever and unexpected weight change     HENT: Negative for congestion, dental problem, ear discharge, ear pain, facial swelling, hearing loss, mouth sores, nosebleeds, postnasal drip, rhinorrhea, sinus pain, sinus pressure, sneezing, sore throat, trouble swallowing and voice change  Eyes: Negative for photophobia, pain, discharge, redness and itching  Respiratory: Negative for cough, chest tightness, shortness of breath and wheezing  Cardiovascular: Negative for chest pain, palpitations and leg swelling  Gastrointestinal: Negative for abdominal distention, abdominal pain, blood in stool, constipation, diarrhea and nausea  Endocrine: Negative for cold intolerance, heat intolerance, polydipsia, polyphagia and polyuria  Genitourinary: Negative for dysuria, flank pain, frequency, hematuria and urgency  Musculoskeletal: Negative for arthralgias, back pain, myalgias and neck pain  Skin: Negative for color change and pallor  Allergic/Immunologic: Negative for environmental allergies and food allergies  Neurological: Negative for dizziness, weakness, light-headedness, numbness and headaches  Hematological: Negative for adenopathy  Does not bruise/bleed easily  Psychiatric/Behavioral: Negative for behavioral problems, sleep disturbance and suicidal ideas  The patient is not nervous/anxious  Objective:  Physical Exam   Constitutional: He is oriented to person, place, and time  He appears well-developed and well-nourished  HENT:   Head: Normocephalic and atraumatic  Nose: Nose normal    Mouth/Throat: Oropharynx is clear and moist  Mucous membranes are dry  No oropharyngeal exudate  Eyes: Conjunctivae and EOM are normal  Pupils are equal, round, and reactive to light  Right eye exhibits no discharge  Left eye exhibits no discharge  No scleral icterus  Neck: Normal range of motion  Neck supple  No tracheal deviation present  No thyromegaly present  Cardiovascular: Normal rate, regular rhythm and normal heart sounds  No murmur heard  Pulmonary/Chest: Effort normal and breath sounds normal  No respiratory distress  He has no wheezes  He has no rales  Abdominal: Soft   Bowel sounds are normal  He exhibits no distension and no mass  There is no tenderness  There is no rebound  Musculoskeletal: Normal range of motion  He exhibits edema  Lymphadenopathy:     He has no cervical adenopathy  Neurological: He is alert and oriented to person, place, and time  He has normal reflexes  No cranial nerve deficit  Skin: Skin is warm  No rash noted  No erythema  No pallor  Psychiatric: He has a normal mood and affect  His behavior is normal  Judgment and thought content normal    Nursing note and vitals reviewed  Past Surgical History:   Procedure Laterality Date    CARPAL TUNNEL RELEASE Bilateral     Last Assessed:9/26/2014    CYSTOSCOPY      HERNIA REPAIR      Femoral Hernia Repair Unilateral    [place] CYSTO/URETERO W/LITHOTRIPSY &INDWELL STENT INSRT Left 8/11/2016    Procedure: CYSTOSCOPY URETEROSCOPY,RETROGRADE PYELOGRAM AND INSERTION STENT URETERAL;  Surgeon: James Villasenor MD;  [facility];  Service: Urology    [address] EVANS Right 11/17/2016    Procedure: ESWL;  Surgeon: James Villasenor MD;  [facility];  Service: Urology       Family History   Problem Relation Age of Onset    Coronary artery disease Mother     Heart disease Father     Lung cancer Father     Stomach cancer Brother          Current Outpatient Prescriptions:     allopurinol (ZYLOPRIM) 100 mg tablet, Take 100 mg by mouth daily  , Disp: , Rfl:     Calcium Citrate 200 MG TABS, Take 2 tablets by mouth, Disp: , Rfl:     CINNAMON PO, Take 1,000 mg by mouth daily, Disp: , Rfl:     glipiZIDE (GLUCOTROL) 5 mg tablet, Take 1 tablet (5 mg total) by mouth daily, Disp: 90 tablet, Rfl: 1    lisinopril (ZESTRIL) 10 mg tablet, Take 10 mg by mouth daily  , Disp: , Rfl:     metFORMIN (GLUCOPHAGE) 1000 MG tablet, Take 1,000 mg by mouth 2 (two) times a day with meals  , Disp: , Rfl:     metoprolol tartrate (LOPRESSOR) 25 mg tablet, Take 1 tablet by mouth 2 (two) times a day, Disp: , Rfl:    Multiple Vitamin (MULTIVITAMIN) tablet, Take 1 tablet by mouth daily, Disp: , Rfl:     simvastatin (ZOCOR) 10 mg tablet, Take 1 tablet (10 mg total) by mouth daily at bedtime, Disp: 90 tablet, Rfl: 1    Allergies   Allergen Reactions    Penicillins        Vitals:    04/25/18 0748   BP: 130/70   Pulse: 78   Resp: 18   SpO2: 98%   Weight: 134 kg (295 lb)   Height: 5' 10" (1 778 m)

## 2018-04-25 NOTE — ASSESSMENT & PLAN NOTE
Lab Results   Component Value Date    HGBA1C 6 5 (H) 04/20/2018     His A1c has significantly improved, he will continue metformin and glipizide, his blood sugar is better control, discussed with him to improve his water intake as his creatinine has increased 1 7 and his baseline has been 1 3-1 4, 1 month I will recheck his BMP and uric acid and if his creatinine does not improve with well hydration I will  just his medication    Follow-up 1 month

## 2018-05-07 ENCOUNTER — HOSPITAL ENCOUNTER (OUTPATIENT)
Dept: RADIOLOGY | Facility: HOSPITAL | Age: 63
Discharge: HOME | End: 2018-05-07
Payer: COMMERCIAL

## 2018-05-07 DIAGNOSIS — N20.0 CALCULUS OF KIDNEY: ICD-10-CM

## 2018-05-07 DIAGNOSIS — I10 ESSENTIAL HYPERTENSION: Primary | ICD-10-CM

## 2018-05-07 PROCEDURE — 74018 RADEX ABDOMEN 1 VIEW: CPT

## 2018-05-15 ENCOUNTER — TRANSCRIBE ORDERS (OUTPATIENT)
Dept: LAB | Facility: CLINIC | Age: 63
End: 2018-05-15

## 2018-05-15 ENCOUNTER — APPOINTMENT (OUTPATIENT)
Dept: LAB | Facility: CLINIC | Age: 63
End: 2018-05-15
Payer: COMMERCIAL

## 2018-05-15 DIAGNOSIS — M1A.9XX0 CHRONIC GOUT WITHOUT TOPHUS, UNSPECIFIED CAUSE, UNSPECIFIED SITE: ICD-10-CM

## 2018-05-15 DIAGNOSIS — E11.9 TYPE 2 DIABETES MELLITUS WITHOUT COMPLICATION, WITHOUT LONG-TERM CURRENT USE OF INSULIN (HCC): ICD-10-CM

## 2018-05-15 LAB
ANION GAP SERPL CALCULATED.3IONS-SCNC: 8 MMOL/L (ref 4–13)
BUN SERPL-MCNC: 34 MG/DL (ref 5–25)
CALCIUM SERPL-MCNC: 9.1 MG/DL (ref 8.3–10.1)
CHLORIDE SERPL-SCNC: 103 MMOL/L (ref 100–108)
CO2 SERPL-SCNC: 28 MMOL/L (ref 21–32)
CREAT SERPL-MCNC: 1.74 MG/DL (ref 0.6–1.3)
GFR SERPL CREATININE-BSD FRML MDRD: 41 ML/MIN/1.73SQ M
GLUCOSE P FAST SERPL-MCNC: 131 MG/DL (ref 65–99)
POTASSIUM SERPL-SCNC: 5.4 MMOL/L (ref 3.5–5.3)
SODIUM SERPL-SCNC: 139 MMOL/L (ref 136–145)
URATE SERPL-MCNC: 7.3 MG/DL (ref 4.2–8)

## 2018-05-15 PROCEDURE — 36415 COLL VENOUS BLD VENIPUNCTURE: CPT

## 2018-05-15 PROCEDURE — 80048 BASIC METABOLIC PNL TOTAL CA: CPT

## 2018-05-15 PROCEDURE — 84550 ASSAY OF BLOOD/URIC ACID: CPT

## 2018-05-21 ENCOUNTER — HOSPITAL ENCOUNTER (OUTPATIENT)
Dept: RADIOLOGY | Facility: HOSPITAL | Age: 63
Discharge: HOME | End: 2018-05-21
Attending: FAMILY MEDICINE
Payer: COMMERCIAL

## 2018-05-21 ENCOUNTER — OFFICE VISIT (OUTPATIENT)
Dept: FAMILY MEDICINE CLINIC | Facility: CLINIC | Age: 63
End: 2018-05-21
Payer: COMMERCIAL

## 2018-05-21 ENCOUNTER — APPOINTMENT (OUTPATIENT)
Dept: LAB | Facility: CLINIC | Age: 63
End: 2018-05-21
Payer: COMMERCIAL

## 2018-05-21 VITALS
HEIGHT: 70 IN | SYSTOLIC BLOOD PRESSURE: 122 MMHG | DIASTOLIC BLOOD PRESSURE: 64 MMHG | HEART RATE: 68 BPM | BODY MASS INDEX: 41.66 KG/M2 | WEIGHT: 291 LBS | OXYGEN SATURATION: 97 %

## 2018-05-21 DIAGNOSIS — N28.9 ACUTE RENAL INSUFFICIENCY: ICD-10-CM

## 2018-05-21 DIAGNOSIS — N20.0 NEPHROLITHIASIS: ICD-10-CM

## 2018-05-21 DIAGNOSIS — N20.0 NEPHROLITHIASIS: Primary | ICD-10-CM

## 2018-05-21 DIAGNOSIS — E87.5 HYPERKALEMIA: ICD-10-CM

## 2018-05-21 DIAGNOSIS — M1A.9XX0 CHRONIC GOUT WITHOUT TOPHUS, UNSPECIFIED CAUSE, UNSPECIFIED SITE: ICD-10-CM

## 2018-05-21 PROBLEM — B35.1 ONYCHOMYCOSIS OF TOENAIL: Status: ACTIVE | Noted: 2018-01-22

## 2018-05-21 PROBLEM — B35.3 TINEA PEDIS OF LEFT FOOT: Status: ACTIVE | Noted: 2018-01-22

## 2018-05-21 LAB
ANION GAP SERPL CALCULATED.3IONS-SCNC: 9 MMOL/L (ref 4–13)
BUN SERPL-MCNC: 26 MG/DL (ref 5–25)
CALCIUM SERPL-MCNC: 9.3 MG/DL (ref 8.3–10.1)
CHLORIDE SERPL-SCNC: 103 MMOL/L (ref 100–108)
CO2 SERPL-SCNC: 27 MMOL/L (ref 21–32)
CREAT SERPL-MCNC: 1.61 MG/DL (ref 0.6–1.3)
GFR SERPL CREATININE-BSD FRML MDRD: 45 ML/MIN/1.73SQ M
GLUCOSE SERPL-MCNC: 95 MG/DL (ref 65–140)
POTASSIUM SERPL-SCNC: 5 MMOL/L (ref 3.5–5.3)
SODIUM SERPL-SCNC: 139 MMOL/L (ref 136–145)

## 2018-05-21 PROCEDURE — 74018 RADEX ABDOMEN 1 VIEW: CPT

## 2018-05-21 PROCEDURE — 80048 BASIC METABOLIC PNL TOTAL CA: CPT

## 2018-05-21 PROCEDURE — 36415 COLL VENOUS BLD VENIPUNCTURE: CPT

## 2018-05-21 PROCEDURE — 99214 OFFICE O/P EST MOD 30 MIN: CPT | Performed by: FAMILY MEDICINE

## 2018-05-21 RX ORDER — MELATONIN
1000 DAILY
COMMUNITY
End: 2019-12-03

## 2018-05-21 NOTE — ASSESSMENT & PLAN NOTE
Advised to continue drinking more fluid, get a basic metabolic panel start today and will follow up on the labs and plan accordingly

## 2018-05-21 NOTE — PROGRESS NOTES
Assessment/Plan:    Problem List Items Addressed This Visit        Genitourinary    Nephrolithiasis - Primary     He just passed the kidney stone few days ago and now he is asymptomatic, his x-rays reviewed which was ordered by urologist which shows more than a cm stone in the right kidney and he has small stone in the left kidney, advised to have his stat KUB again and basic metabolic panel to review his kidney function and potassium level and I will plan accordingly if he did not pass the stone he will be sent to the ER         Relevant Orders    Basic metabolic panel    XR abdomen 1 view kub    US retroperitoneal complete    Acute renal insufficiency     Creatinine is still 1 7 for which has not improved since last month and now he has region and renal stones which could be causing pressure on his kidney, will check the stat x-ray today and BMP to decide further care         Relevant Orders    Basic metabolic panel    US retroperitoneal complete       Other    Gout    Hyperkalemia     Advised to continue drinking more fluid, get a basic metabolic panel start today and will follow up on the labs and plan accordingly         Relevant Orders    Basic metabolic panel    US retroperitoneal complete          Chief Complaint   Patient presents with    Follow-up       Subjective:   Patient ID: Yvette Mcintosh is a 58 y o  male      He is here for one-month follow-up on his labs as he has elevated creatinine 1 73 on last visit and advised to have more hydration and repeat labs, he says he had labs done 1 week ago and now few days ago he had x-ray KUB done for follow-up with urologist which is due on Wednesday as he has previous history of kidney stones and also he was in lot of pain in last few days and he noticed a stone past which was quite big, he does not have any pain now he has been drinking more fluid and he just took Aleve few times and did not go to the ER and he is asymptomatic now, he denies any fever chills abdominal pain nausea vomiting, and keeping good hydration and urine is clear  He is diabetic and hypertension he takes metformin 1000 mg twice a day and he is on lisinopril and his blood pressure has been stable he is not on any diuretics, he also has gout and he is on allopurinol low dose and uric acid level has increased,  His potassium level is also slightly high 5 3 on his last week blood work  Review of Systems   Constitutional: Negative for activity change, appetite change, chills, diaphoresis, fatigue, fever and unexpected weight change  HENT: Negative for congestion, dental problem, ear discharge, ear pain, facial swelling, hearing loss, mouth sores, nosebleeds, postnasal drip, rhinorrhea, sinus pain, sinus pressure, sneezing, sore throat, trouble swallowing and voice change  Eyes: Negative for photophobia, pain, discharge, redness and itching  Respiratory: Negative for cough, chest tightness, shortness of breath and wheezing  Cardiovascular: Negative for chest pain, palpitations and leg swelling  Gastrointestinal: Negative for abdominal distention, abdominal pain, blood in stool, constipation, diarrhea and nausea  Endocrine: Negative for cold intolerance, heat intolerance, polydipsia, polyphagia and polyuria  Genitourinary: Negative for dysuria, flank pain, frequency, hematuria and urgency  Musculoskeletal: Negative for arthralgias, back pain, myalgias and neck pain  Skin: Negative for color change and pallor  Allergic/Immunologic: Negative for environmental allergies and food allergies  Neurological: Negative for dizziness, weakness, light-headedness, numbness and headaches  Hematological: Negative for adenopathy  Does not bruise/bleed easily  Psychiatric/Behavioral: Negative for behavioral problems, sleep disturbance and suicidal ideas  The patient is not nervous/anxious  Objective:  Physical Exam   Constitutional: He is oriented to person, place, and time   He appears well-developed and well-nourished  HENT:   Head: Normocephalic and atraumatic  Nose: Nose normal    Mouth/Throat: Oropharynx is clear and moist  No oropharyngeal exudate  Eyes: Conjunctivae and EOM are normal  Pupils are equal, round, and reactive to light  Right eye exhibits no discharge  Left eye exhibits no discharge  No scleral icterus  Neck: Normal range of motion  Neck supple  No tracheal deviation present  No thyromegaly present  Cardiovascular: Normal rate, regular rhythm and normal heart sounds  No murmur heard  Pulmonary/Chest: Effort normal and breath sounds normal  No respiratory distress  He has no wheezes  He has no rales  Abdominal: Soft  Bowel sounds are normal  He exhibits no distension and no mass  There is no tenderness  There is no rebound  No CVA tenderness   Musculoskeletal: Normal range of motion  He exhibits edema  Lymphadenopathy:     He has no cervical adenopathy  Neurological: He is alert and oriented to person, place, and time  He has normal reflexes  No cranial nerve deficit  Skin: Skin is warm  No rash noted  No erythema  No pallor  Psychiatric: He has a normal mood and affect  His behavior is normal  Judgment and thought content normal    Nursing note and vitals reviewed           Past Surgical History:   Procedure Laterality Date    CARPAL TUNNEL RELEASE Bilateral     Last Assessed:9/26/2014    CYSTOSCOPY      HERNIA REPAIR      Femoral Hernia Repair Unilateral    [place] CYSTO/URETERO W/LITHOTRIPSY &INDWELL STENT INSRT Left 8/11/2016    Procedure: CYSTOSCOPY URETEROSCOPY,RETROGRADE PYELOGRAM AND INSERTION STENT URETERAL;  Surgeon: Leon Walsh MD;  [facility];  Service: Urology    [place] FRAGMENT KIDNEY STONE/ ESWL Right 11/17/2016    Procedure: ESWL;  Surgeon: Leon Walsh MD;  [facility];  Service: Urology       Family History   Problem Relation Age of Onset    Coronary artery disease Mother     Heart disease Father     Lung cancer Father     Stomach cancer Brother          Current Outpatient Prescriptions:     cholecalciferol (VITAMIN D3) 1,000 units tablet, Take 1,000 Units by mouth daily, Disp: , Rfl:     allopurinol (ZYLOPRIM) 100 mg tablet, Take 100 mg by mouth daily  , Disp: , Rfl:     CINNAMON PO, Take 1,000 mg by mouth daily, Disp: , Rfl:     glipiZIDE (GLUCOTROL) 5 mg tablet, Take 1 tablet (5 mg total) by mouth daily, Disp: 90 tablet, Rfl: 1    lisinopril (ZESTRIL) 10 mg tablet, Take 10 mg by mouth daily  , Disp: , Rfl:     metFORMIN (GLUCOPHAGE) 1000 MG tablet, Take 1,000 mg by mouth 2 (two) times a day with meals  , Disp: , Rfl:     metoprolol tartrate (LOPRESSOR) 25 mg tablet, Take 1 tablet (25 mg total) by mouth every 12 (twelve) hours, Disp: 180 tablet, Rfl: 0    Multiple Vitamin (MULTIVITAMIN) tablet, Take 1 tablet by mouth daily, Disp: , Rfl:     simvastatin (ZOCOR) 10 mg tablet, Take 1 tablet (10 mg total) by mouth daily at bedtime, Disp: 90 tablet, Rfl: 1    Allergies   Allergen Reactions    Penicillins        Vitals:    05/21/18 0801   BP: 122/64   Pulse: 68   SpO2: 97%   Weight: 132 kg (291 lb)   Height: 5' 10" (1 778 m)

## 2018-05-21 NOTE — ASSESSMENT & PLAN NOTE
Creatinine is still 1 7 for which has not improved since last month and now he has region and renal stones which could be causing pressure on his kidney, will check the stat x-ray today and BMP to decide further care

## 2018-05-21 NOTE — ASSESSMENT & PLAN NOTE
He just passed the kidney stone few days ago and now he is asymptomatic, his x-rays reviewed which was ordered by urologist which shows more than a cm stone in the right kidney and he has small stone in the left kidney, advised to have his stat KUB again and basic metabolic panel to review his kidney function and potassium level and I will plan accordingly if he did not pass the stone he will be sent to the ER

## 2018-05-22 ENCOUNTER — HOSPITAL ENCOUNTER (OUTPATIENT)
Dept: ULTRASOUND IMAGING | Facility: HOSPITAL | Age: 63
Discharge: HOME | End: 2018-05-22
Attending: FAMILY MEDICINE
Payer: COMMERCIAL

## 2018-05-22 DIAGNOSIS — N28.9 ACUTE RENAL INSUFFICIENCY: ICD-10-CM

## 2018-05-22 DIAGNOSIS — E87.5 HYPERKALEMIA: ICD-10-CM

## 2018-05-22 DIAGNOSIS — N20.0 NEPHROLITHIASIS: ICD-10-CM

## 2018-05-22 PROCEDURE — 76770 US EXAM ABDO BACK WALL COMP: CPT

## 2018-05-23 ENCOUNTER — OFFICE VISIT (OUTPATIENT)
Dept: UROLOGY | Facility: AMBULATORY SURGERY CENTER | Age: 63
End: 2018-05-23
Payer: COMMERCIAL

## 2018-05-23 VITALS
SYSTOLIC BLOOD PRESSURE: 142 MMHG | WEIGHT: 300 LBS | HEART RATE: 72 BPM | HEIGHT: 68 IN | BODY MASS INDEX: 45.47 KG/M2 | DIASTOLIC BLOOD PRESSURE: 80 MMHG

## 2018-05-23 DIAGNOSIS — N20.0 CALCULUS OF KIDNEY: Primary | ICD-10-CM

## 2018-05-23 DIAGNOSIS — Z12.5 SCREENING FOR PROSTATE CANCER: ICD-10-CM

## 2018-05-23 PROCEDURE — 82360 CALCULUS ASSAY QUANT: CPT | Performed by: UROLOGY

## 2018-05-23 PROCEDURE — 99213 OFFICE O/P EST LOW 20 MIN: CPT | Performed by: UROLOGY

## 2018-05-23 NOTE — PROGRESS NOTES
5/23/2018    [place] Elda Lagunas  1955  [id number]        Assessment  Passed right ureteral calculus, routine prostate cancer screening      Discussion  I stressed the importance of hydration  I recommend follow-up in 1 year with a repeat KUB or sooner if he were to develop recurrent flank pain  His recently passed stone will be sent for analysis  He will have a follow-up PSA and digital rectal examination in 1 year's time for routine prostate cancer screening  History of Present Illness  58 y o  male with a history of nephrolithiasis  He previously underwent right ESWL  This was performed in 2016  More recently he developed right-sided flank pain  He ultimately passed a large stone which he brings with him to the office today  This appears to be most consistent with a 12 mm stone identified on recent KUB imaging  Since that time a follow-up KUB and retroperitoneal ultrasound of been performed and there are no visualized stones on these imaging studies  He denies any lower urinary tract symptoms  AUA Symptom Score  AUA SYMPTOM SCORE      Most Recent Value   AUA SYMPTOM SCORE   How often have you had a sensation of not emptying your bladder completely after you finished urinating? 0   How often have you had to urinate again less than two hours after you finished urinating? 0   How often have you found you stopped and started again several times when you urinate?  0   How often have you found it difficult to postpone urination? 0   How often have you had a weak urinary stream?  0   How often have you had to push or strain to begin urination? 1   How many times did you most typically get up to urinate from the time you went to bed at night until the time you got up in the morning?   1   Quality of Life: If you were to spend the rest of your life with your urinary condition just the way it is now, how would you feel about that?  1   AUA SYMPTOM SCORE  2          Review of Systems  Review of Systems   Constitutional: Negative  HENT: Negative  Eyes: Negative  Respiratory: Negative  Cardiovascular: Negative  Gastrointestinal: Negative  Endocrine: Negative  Genitourinary: Negative  Musculoskeletal: Negative  Skin: Negative  Allergic/Immunologic: Negative  Neurological: Negative  Hematological: Negative  Psychiatric/Behavioral: Negative  Past Medical History  Past Medical History:   Diagnosis Date    Diabetes mellitus ([facility] [address] )     Gout     Hypertension     Leukocytosis     Onset:4/26/2012    Onychomycosis     Onse:4/26/2012    Other muscle spasm     Renal calculi     Shoulder joint pain        Past Social History  Past Surgical History:   Procedure Laterality Date    CARPAL TUNNEL RELEASE Bilateral     Last Assessed:9/26/2014    CYSTOSCOPY      HERNIA REPAIR      Femoral Hernia Repair Unilateral    [place] CYSTO/URETERO W/LITHOTRIPSY &INDWELL STENT INSRT Left 8/11/2016    Procedure: CYSTOSCOPY URETEROSCOPY,RETROGRADE PYELOGRAM AND INSERTION STENT URETERAL;  Surgeon: Bea Chong MD;  [facility];  Service: Urology    [place] FRAGMENT KIDNEY STONE/ ESWL Right 11/17/2016    Procedure: ESWL;  Surgeon: Bea Chong MD;  [facility];  Service: Urology       Past Family History  Family History   Problem Relation Age of Onset    Coronary artery disease Mother     Heart disease Father     Lung cancer Father     Cancer Father     Stomach cancer Brother        Past Social history  Social History     Social History    Marital status: Single     Spouse name: N/A    Number of children: N/A    Years of education: N/A     Occupational History    Not on file       Social History Main Topics    Smoking status: Never Smoker    Smokeless tobacco: Never Used    Alcohol use Yes      Comment: occasional social 1-2x per year    Drug use: No    Sexual activity: Not on file     Other Topics Concern    Not on file     Social History Narrative Living independently alone       Current Medications  Current Outpatient Prescriptions   Medication Sig Dispense Refill    allopurinol (ZYLOPRIM) 100 mg tablet Take 100 mg by mouth daily   cholecalciferol (VITAMIN D3) 1,000 units tablet Take 1,000 Units by mouth daily      CINNAMON PO Take 1,000 mg by mouth daily      glipiZIDE (GLUCOTROL) 5 mg tablet Take 1 tablet (5 mg total) by mouth daily 90 tablet 1    lisinopril (ZESTRIL) 10 mg tablet Take 10 mg by mouth daily   metFORMIN (GLUCOPHAGE) 1000 MG tablet Take 1,000 mg by mouth 2 (two) times a day with meals   metoprolol tartrate (LOPRESSOR) 25 mg tablet Take 1 tablet (25 mg total) by mouth every 12 (twelve) hours 180 tablet 0    Multiple Vitamin (MULTIVITAMIN) tablet Take 1 tablet by mouth daily      simvastatin (ZOCOR) 10 mg tablet Take 1 tablet (10 mg total) by mouth daily at bedtime 90 tablet 1     No current facility-administered medications for this visit  Allergies  Allergies   Allergen Reactions    Penicillins        Past Medical History, Social History, Family History, medications and allergies were reviewed  Vitals  Vitals:    05/23/18 0946   BP: 142/80   BP Location: Left arm   Patient Position: Sitting   Cuff Size: Standard   Pulse: 72   Weight: 136 kg (300 lb)   Height: 5' 8" (1 727 m)       Physical Exam  Physical Exam  On examination he is in no acute distress  His abdomen is soft obese nontender nondistended   examination reveals no CVA tenderness  Skin is warm  Extremities with pitting edema below the knees    Neurologic is grossly intact and nonfocal   Gait normal   Affect normal      Results  Lab Results   Component Value Date    PSA 1 2 09/11/2017     Lab Results   Component Value Date    GLUCOSE 95 05/21/2018    CALCIUM 9 3 05/21/2018     05/21/2018    K 5 0 05/21/2018    CO2 27 05/21/2018     05/21/2018    BUN 26 (H) 05/21/2018    CREATININE 1 61 (H) 05/21/2018     Lab Results   Component Value Date    WBC 9 65 04/20/2018    HGB 12 6 04/20/2018    HCT 39 3 04/20/2018    MCV 87 04/20/2018     04/20/2018         Office Urine Dip  No results found for this or any previous visit (from the past 1 hour(s)) ]

## 2018-06-01 LAB
CA PHOS MFR STONE: 3 %
COLOR STONE: NORMAL
COM MFR STONE: 97 %
COMMENT-STONE3: NORMAL
COMPOSITION: NORMAL
LABORATORY COMMENT REPORT: NORMAL
NIDUS STONE QL: NORMAL
PHOTO: NORMAL
SIZE STONE: NORMAL MM
STONE ANALYSIS-IMP: NORMAL
WT STONE: 177 MG

## 2018-06-13 DIAGNOSIS — E78.5 HYPERLIPIDEMIA, UNSPECIFIED HYPERLIPIDEMIA TYPE: Primary | ICD-10-CM

## 2018-06-15 ENCOUNTER — TELEPHONE (OUTPATIENT)
Dept: FAMILY MEDICINE CLINIC | Facility: CLINIC | Age: 63
End: 2018-06-15

## 2018-06-15 DIAGNOSIS — E78.5 HYPERLIPIDEMIA, UNSPECIFIED HYPERLIPIDEMIA TYPE: ICD-10-CM

## 2018-06-15 RX ORDER — METFORMIN HYDROCHLORIDE EXTENDED-RELEASE TABLETS 1000 MG/1
1000 TABLET, FILM COATED, EXTENDED RELEASE ORAL
Qty: 180 TABLET | Refills: 1 | Status: SHIPPED | OUTPATIENT
Start: 2018-06-15 | End: 2018-06-15 | Stop reason: SDUPTHER

## 2018-06-15 NOTE — TELEPHONE ENCOUNTER
Metformin 500mg, not ER, was sent to the pharmacy  Pt is required to take twice daily  Please inform patient about the change

## 2018-06-15 NOTE — TELEPHONE ENCOUNTER
Pharmacist called and stated that pt's Metformin 1000mg is not his Formulary  Metformin 500mg is in Formulary and it can not be EXTENDED RELEASE

## 2018-06-28 DIAGNOSIS — N20.0 NEPHROLITHIASIS: ICD-10-CM

## 2018-06-28 DIAGNOSIS — I10 BENIGN ESSENTIAL HYPERTENSION: Primary | ICD-10-CM

## 2018-06-28 PROCEDURE — 4010F ACE/ARB THERAPY RXD/TAKEN: CPT | Performed by: FAMILY MEDICINE

## 2018-06-28 RX ORDER — ALLOPURINOL 100 MG/1
100 TABLET ORAL DAILY
Qty: 90 TABLET | Refills: 1 | Status: SHIPPED | OUTPATIENT
Start: 2018-06-28 | End: 2019-01-14 | Stop reason: SDUPTHER

## 2018-06-28 RX ORDER — LISINOPRIL 10 MG/1
10 TABLET ORAL DAILY
Qty: 90 TABLET | Refills: 1 | Status: SHIPPED | OUTPATIENT
Start: 2018-06-28 | End: 2019-01-14 | Stop reason: SDUPTHER

## 2018-08-17 DIAGNOSIS — I10 ESSENTIAL HYPERTENSION: ICD-10-CM

## 2018-08-17 DIAGNOSIS — E78.2 MIXED HYPERLIPIDEMIA: ICD-10-CM

## 2018-08-18 RX ORDER — SIMVASTATIN 10 MG
10 TABLET ORAL
Qty: 90 TABLET | Refills: 0 | Status: SHIPPED | OUTPATIENT
Start: 2018-08-18 | End: 2018-08-26 | Stop reason: SDUPTHER

## 2018-08-26 DIAGNOSIS — E78.2 MIXED HYPERLIPIDEMIA: ICD-10-CM

## 2018-08-27 DIAGNOSIS — E78.5 HYPERLIPIDEMIA, UNSPECIFIED HYPERLIPIDEMIA TYPE: ICD-10-CM

## 2018-08-27 RX ORDER — SIMVASTATIN 10 MG
10 TABLET ORAL
Qty: 90 TABLET | Refills: 0 | Status: SHIPPED | OUTPATIENT
Start: 2018-08-27 | End: 2018-11-21 | Stop reason: SDUPTHER

## 2018-09-17 ENCOUNTER — TELEPHONE (OUTPATIENT)
Dept: FAMILY MEDICINE CLINIC | Facility: CLINIC | Age: 63
End: 2018-09-17

## 2018-09-17 DIAGNOSIS — E11.9 TYPE 2 DIABETES MELLITUS WITHOUT COMPLICATION, WITHOUT LONG-TERM CURRENT USE OF INSULIN (HCC): Primary | ICD-10-CM

## 2018-09-17 DIAGNOSIS — I10 BENIGN ESSENTIAL HYPERTENSION: ICD-10-CM

## 2018-09-17 DIAGNOSIS — E78.2 MIXED HYPERLIPIDEMIA: ICD-10-CM

## 2018-09-17 DIAGNOSIS — M1A.9XX0 CHRONIC GOUT WITHOUT TOPHUS, UNSPECIFIED CAUSE, UNSPECIFIED SITE: ICD-10-CM

## 2018-09-17 NOTE — TELEPHONE ENCOUNTER
Patient called back and said that he was supposed to schedule a follow up appointment    He insisted that he needs labs before his appointment  I do not see any labs ordered to be done  He would like a call back once the labs are generated

## 2018-09-18 ENCOUNTER — APPOINTMENT (OUTPATIENT)
Dept: LAB | Facility: CLINIC | Age: 63
End: 2018-09-18
Payer: COMMERCIAL

## 2018-09-18 DIAGNOSIS — E11.9 TYPE 2 DIABETES MELLITUS WITHOUT COMPLICATION, WITHOUT LONG-TERM CURRENT USE OF INSULIN (HCC): ICD-10-CM

## 2018-09-18 DIAGNOSIS — M1A.9XX0 CHRONIC GOUT WITHOUT TOPHUS, UNSPECIFIED CAUSE, UNSPECIFIED SITE: ICD-10-CM

## 2018-09-18 DIAGNOSIS — E78.2 MIXED HYPERLIPIDEMIA: ICD-10-CM

## 2018-09-18 LAB
25(OH)D3 SERPL-MCNC: 42 NG/ML (ref 30–100)
ALBUMIN SERPL BCP-MCNC: 3.3 G/DL (ref 3.5–5)
ALP SERPL-CCNC: 81 U/L (ref 46–116)
ALT SERPL W P-5'-P-CCNC: 38 U/L (ref 12–78)
ANION GAP SERPL CALCULATED.3IONS-SCNC: 7 MMOL/L (ref 4–13)
AST SERPL W P-5'-P-CCNC: 22 U/L (ref 5–45)
BASOPHILS # BLD AUTO: 0.04 THOUSANDS/ΜL (ref 0–0.1)
BASOPHILS NFR BLD AUTO: 0 % (ref 0–1)
BILIRUB SERPL-MCNC: 0.4 MG/DL (ref 0.2–1)
BUN SERPL-MCNC: 22 MG/DL (ref 5–25)
CALCIUM SERPL-MCNC: 9.3 MG/DL (ref 8.3–10.1)
CHLORIDE SERPL-SCNC: 104 MMOL/L (ref 100–108)
CHOLEST SERPL-MCNC: 111 MG/DL (ref 50–200)
CO2 SERPL-SCNC: 27 MMOL/L (ref 21–32)
CREAT SERPL-MCNC: 1.51 MG/DL (ref 0.6–1.3)
EOSINOPHIL # BLD AUTO: 0.18 THOUSAND/ΜL (ref 0–0.61)
EOSINOPHIL NFR BLD AUTO: 2 % (ref 0–6)
ERYTHROCYTE [DISTWIDTH] IN BLOOD BY AUTOMATED COUNT: 14.9 % (ref 11.6–15.1)
EST. AVERAGE GLUCOSE BLD GHB EST-MCNC: 126 MG/DL
GFR SERPL CREATININE-BSD FRML MDRD: 49 ML/MIN/1.73SQ M
GLUCOSE P FAST SERPL-MCNC: 137 MG/DL (ref 65–99)
HBA1C MFR BLD: 6 % (ref 4.2–6.3)
HCT VFR BLD AUTO: 38.7 % (ref 36.5–49.3)
HDLC SERPL-MCNC: 32 MG/DL (ref 40–60)
HGB BLD-MCNC: 12.3 G/DL (ref 12–17)
IMM GRANULOCYTES # BLD AUTO: 0.04 THOUSAND/UL (ref 0–0.2)
IMM GRANULOCYTES NFR BLD AUTO: 0 % (ref 0–2)
LDLC SERPL CALC-MCNC: 50 MG/DL (ref 0–100)
LYMPHOCYTES # BLD AUTO: 2.15 THOUSANDS/ΜL (ref 0.6–4.47)
LYMPHOCYTES NFR BLD AUTO: 22 % (ref 14–44)
MCH RBC QN AUTO: 29 PG (ref 26.8–34.3)
MCHC RBC AUTO-ENTMCNC: 31.8 G/DL (ref 31.4–37.4)
MCV RBC AUTO: 91 FL (ref 82–98)
MONOCYTES # BLD AUTO: 0.71 THOUSAND/ΜL (ref 0.17–1.22)
MONOCYTES NFR BLD AUTO: 7 % (ref 4–12)
NEUTROPHILS # BLD AUTO: 6.55 THOUSANDS/ΜL (ref 1.85–7.62)
NEUTS SEG NFR BLD AUTO: 69 % (ref 43–75)
NONHDLC SERPL-MCNC: 79 MG/DL
NRBC BLD AUTO-RTO: 0 /100 WBCS
PLATELET # BLD AUTO: 201 THOUSANDS/UL (ref 149–390)
PMV BLD AUTO: 10.5 FL (ref 8.9–12.7)
POTASSIUM SERPL-SCNC: 4.3 MMOL/L (ref 3.5–5.3)
PROT SERPL-MCNC: 6.9 G/DL (ref 6.4–8.2)
RBC # BLD AUTO: 4.24 MILLION/UL (ref 3.88–5.62)
SODIUM SERPL-SCNC: 138 MMOL/L (ref 136–145)
TRIGL SERPL-MCNC: 145 MG/DL
TSH SERPL DL<=0.05 MIU/L-ACNC: 3.2 UIU/ML (ref 0.36–3.74)
URATE SERPL-MCNC: 8 MG/DL (ref 4.2–8)
WBC # BLD AUTO: 9.67 THOUSAND/UL (ref 4.31–10.16)

## 2018-09-18 PROCEDURE — 82306 VITAMIN D 25 HYDROXY: CPT

## 2018-09-18 PROCEDURE — 80053 COMPREHEN METABOLIC PANEL: CPT

## 2018-09-18 PROCEDURE — 84443 ASSAY THYROID STIM HORMONE: CPT

## 2018-09-18 PROCEDURE — 36415 COLL VENOUS BLD VENIPUNCTURE: CPT

## 2018-09-18 PROCEDURE — 84550 ASSAY OF BLOOD/URIC ACID: CPT

## 2018-09-18 PROCEDURE — 83036 HEMOGLOBIN GLYCOSYLATED A1C: CPT

## 2018-09-18 PROCEDURE — 85025 COMPLETE CBC W/AUTO DIFF WBC: CPT

## 2018-09-18 PROCEDURE — 80061 LIPID PANEL: CPT

## 2018-09-20 ENCOUNTER — OFFICE VISIT (OUTPATIENT)
Dept: FAMILY MEDICINE CLINIC | Facility: CLINIC | Age: 63
End: 2018-09-20
Payer: COMMERCIAL

## 2018-09-20 VITALS
HEART RATE: 77 BPM | WEIGHT: 289 LBS | SYSTOLIC BLOOD PRESSURE: 120 MMHG | RESPIRATION RATE: 18 BRPM | DIASTOLIC BLOOD PRESSURE: 70 MMHG | BODY MASS INDEX: 43.8 KG/M2 | OXYGEN SATURATION: 97 % | HEIGHT: 68 IN

## 2018-09-20 DIAGNOSIS — N18.2 TYPE 2 DIABETES MELLITUS WITH STAGE 2 CHRONIC KIDNEY DISEASE, WITHOUT LONG-TERM CURRENT USE OF INSULIN (HCC): ICD-10-CM

## 2018-09-20 DIAGNOSIS — Z23 NEED FOR INFLUENZA VACCINATION: ICD-10-CM

## 2018-09-20 DIAGNOSIS — N28.9 ACUTE RENAL INSUFFICIENCY: ICD-10-CM

## 2018-09-20 DIAGNOSIS — E78.2 MIXED HYPERLIPIDEMIA: ICD-10-CM

## 2018-09-20 DIAGNOSIS — Z12.11 SCREENING FOR COLON CANCER: Primary | ICD-10-CM

## 2018-09-20 DIAGNOSIS — I10 BENIGN ESSENTIAL HYPERTENSION: ICD-10-CM

## 2018-09-20 DIAGNOSIS — M1A.9XX0 CHRONIC GOUT WITHOUT TOPHUS, UNSPECIFIED CAUSE, UNSPECIFIED SITE: ICD-10-CM

## 2018-09-20 DIAGNOSIS — E66.01 MORBID OBESITY (HCC): ICD-10-CM

## 2018-09-20 DIAGNOSIS — E11.22 TYPE 2 DIABETES MELLITUS WITH STAGE 2 CHRONIC KIDNEY DISEASE, WITHOUT LONG-TERM CURRENT USE OF INSULIN (HCC): ICD-10-CM

## 2018-09-20 DIAGNOSIS — E08.9 DIABETES MELLITUS DUE TO UNDERLYING CONDITION WITHOUT COMPLICATION, WITHOUT LONG-TERM CURRENT USE OF INSULIN (HCC): ICD-10-CM

## 2018-09-20 PROBLEM — N20.0 NEPHROLITHIASIS: Status: RESOLVED | Noted: 2018-05-21 | Resolved: 2018-09-20

## 2018-09-20 PROBLEM — E87.5 HYPERKALEMIA: Status: RESOLVED | Noted: 2018-05-21 | Resolved: 2018-09-20

## 2018-09-20 PROCEDURE — 99214 OFFICE O/P EST MOD 30 MIN: CPT | Performed by: FAMILY MEDICINE

## 2018-09-20 PROCEDURE — 90471 IMMUNIZATION ADMIN: CPT

## 2018-09-20 PROCEDURE — 3078F DIAST BP <80 MM HG: CPT | Performed by: FAMILY MEDICINE

## 2018-09-20 PROCEDURE — 3008F BODY MASS INDEX DOCD: CPT | Performed by: FAMILY MEDICINE

## 2018-09-20 PROCEDURE — 90682 RIV4 VACC RECOMBINANT DNA IM: CPT

## 2018-09-20 PROCEDURE — 3074F SYST BP LT 130 MM HG: CPT | Performed by: FAMILY MEDICINE

## 2018-09-20 RX ORDER — GLIPIZIDE 5 MG/1
5 TABLET ORAL DAILY
Qty: 90 TABLET | Refills: 1 | Status: SHIPPED | OUTPATIENT
Start: 2018-09-20 | End: 2019-03-18 | Stop reason: SDUPTHER

## 2018-09-20 NOTE — ASSESSMENT & PLAN NOTE
Lab Results   Component Value Date    HGBA1C 6 0 09/18/2018       No results for input(s): POCGLU in the last 72 hours  Blood Sugar Average: Last 72 hrs:   diabetes improved, he needs to see the ophthalmologist, as he is diabetic  Influenza vaccine given  He follows podiatrist regularly for onychomycosis and callus on his feet  Continue same medication for diabetes

## 2018-09-20 NOTE — ASSESSMENT & PLAN NOTE
Renal function improved, now creatinine 1 5    He passed the kidney stone now he drinks more water and he has chronic kidney disease

## 2018-09-20 NOTE — PROGRESS NOTES
Assessment/Plan:    Problem List Items Addressed This Visit        Endocrine    Type 2 diabetes mellitus with kidney complication, without long-term current use of insulin ([facility] )     Lab Results   Component Value Date    HGBA1C 6 0 09/18/2018       No results for input(s): POCGLU in the last 72 hours  Blood Sugar Average: Last 72 hrs:   diabetes improved, he needs to see the ophthalmologist, as he is diabetic  Influenza vaccine given  He follows podiatrist regularly for onychomycosis and callus on his feet  Continue same medication for diabetes  Relevant Medications    glipiZIDE (GLUCOTROL) 5 mg tablet       Cardiovascular and Mediastinum    Benign essential hypertension     Hypertension is stable continue lisinopril            Genitourinary    Acute renal insufficiency     Renal function improved, now creatinine 1 5    He passed the kidney stone now he drinks more water and he has chronic kidney disease            Other    Morbid obesity ([facility] )    Gout     Uric acid is elevated, discussed with him to cut down purine diet and continue allopurinol and try to drink more water         Relevant Orders    Uric acid    BMI 40 0-44 9, adult ([facility] )     Discussed with him that he needs to lose weight and he needs to exercise         Hyperlipidemia     HDL went down, needs to improve his diet and lose weight, exercise recommended         Relevant Orders    Lipid panel    Screening for colon cancer - Primary    Relevant Orders    Ambulatory referral to Gastroenterology    Need for influenza vaccination     Influenza vaccine given         Relevant Orders    influenza vaccine, [id number], quadrivalent, recombinant, PF, 0 5 mL, for patients 18 yr+ ([facility]) (Completed)      Other Visit Diagnoses     Diabetes mellitus due to underlying condition without complication, without long-term current use of insulin ([facility] )        Relevant Medications    glipiZIDE (GLUCOTROL) 5 mg tablet    Other Relevant Orders    CBC and differential    Comprehensive metabolic panel    Hemoglobin A1C    Microalbumin / creatinine urine ratio    Ambulatory referral to Ophthalmology          Chief Complaint   Patient presents with    Follow-up       Subjective:   Patient ID: Vincenza Lefort is a 58 y o  male  He is here for diabetes follow-up, he takes his medication regularly and he had his recent labs  He says his check her blood sugar is mostly 120 is in the morning, he denies any hypoglycemia, he has been trying to drink more water, he has history of kidney stones and he passed them, his kidney function was compromised at that time and now after passing stone his renal function improved, his baseline creatinine has been running from 1 4-1 5 now,  He has chronic leg edema,, he saw urologist 1 time and is advised to come back in 1 year  Denies any fever chills, denies any chest pain, last colonoscopy was few years ago and he had polyp removed, he was advised to come back in 2 years but patient did not go,  His eye exam was many years ago,  He follows podiatrist regularly  He has hypertension and he is on lisinopril,, tries to eat low-salt diet  No recent gout attack and he is on low-dose allopurinol     For hyperlipidemia he is on statin          Review of Systems   Constitutional: Negative for activity change, appetite change, chills, diaphoresis, fatigue, fever and unexpected weight change  HENT: Negative for congestion, dental problem, ear discharge, ear pain, facial swelling, hearing loss, mouth sores, nosebleeds, postnasal drip, rhinorrhea, sinus pain, sinus pressure, sneezing, sore throat, trouble swallowing and voice change  Eyes: Negative for photophobia, pain, discharge, redness and itching  Respiratory: Negative for cough, chest tightness, shortness of breath and wheezing  Cardiovascular: Negative for chest pain, palpitations and leg swelling     Gastrointestinal: Negative for abdominal distention, abdominal pain, blood in stool, constipation, diarrhea and nausea  Endocrine: Negative for cold intolerance, heat intolerance, polydipsia, polyphagia and polyuria  Genitourinary: Negative for dysuria, flank pain, frequency, hematuria and urgency  Musculoskeletal: Negative for arthralgias, back pain, myalgias and neck pain  Skin: Negative for color change and pallor  Allergic/Immunologic: Negative for environmental allergies and food allergies  Neurological: Negative for dizziness, weakness, light-headedness, numbness and headaches  Hematological: Negative for adenopathy  Does not bruise/bleed easily  Psychiatric/Behavioral: Negative for behavioral problems, sleep disturbance and suicidal ideas  The patient is not nervous/anxious  Objective:  Physical Exam   Constitutional: He is oriented to person, place, and time  He appears well-developed and well-nourished  HENT:   Head: Normocephalic and atraumatic  Nose: Nose normal    Mouth/Throat: Oropharynx is clear and moist  No oropharyngeal exudate  Eyes: Conjunctivae and EOM are normal  Pupils are equal, round, and reactive to light  Right eye exhibits no discharge  Left eye exhibits no discharge  No scleral icterus  Neck: Normal range of motion  Neck supple  No tracheal deviation present  No thyromegaly present  Cardiovascular: Normal rate, regular rhythm and normal heart sounds  No murmur heard  Pulmonary/Chest: Effort normal and breath sounds normal  No respiratory distress  He has no wheezes  He has no rales  Abdominal: Soft  Bowel sounds are normal  He exhibits no distension and no mass  There is no tenderness  There is no rebound  Musculoskeletal: Normal range of motion  He exhibits edema  Lymphadenopathy:     He has no cervical adenopathy  Neurological: He is alert and oriented to person, place, and time  He has normal reflexes  No cranial nerve deficit  Skin: Skin is warm  No rash noted  No erythema  No pallor     Psychiatric: He has a normal mood and affect  His behavior is normal  Judgment and thought content normal    Nursing note and vitals reviewed           Past Surgical History:   Procedure Laterality Date    CARPAL TUNNEL RELEASE Bilateral     Last Assessed:9/26/2014    CYSTOSCOPY      HERNIA REPAIR      Femoral Hernia Repair Unilateral    [place] CYSTO/URETERO W/LITHOTRIPSY &INDWELL STENT INSRT Left 8/11/2016    Procedure: CYSTOSCOPY URETEROSCOPY,RETROGRADE PYELOGRAM AND INSERTION STENT URETERAL;  Surgeon: Negro Meadows MD;  [facility];  Service: Urology    [address] EVANS Right 11/17/2016    Procedure: ESWL;  Surgeon: Negro Meadows MD;  [facility];  Service: Urology       Family History   Problem Relation Age of Onset    Coronary artery disease Mother     Heart disease Father     Lung cancer Father     Cancer Father     Stomach cancer Brother          Current Outpatient Prescriptions:     allopurinol (ZYLOPRIM) 100 mg tablet, Take 1 tablet (100 mg total) by mouth daily, Disp: 90 tablet, Rfl: 1    cholecalciferol (VITAMIN D3) 1,000 units tablet, Take 1,000 Units by mouth daily, Disp: , Rfl:     CINNAMON PO, Take 1,000 mg by mouth daily, Disp: , Rfl:     glipiZIDE (GLUCOTROL) 5 mg tablet, Take 1 tablet (5 mg total) by mouth daily, Disp: 90 tablet, Rfl: 1    lisinopril (ZESTRIL) 10 mg tablet, Take 1 tablet (10 mg total) by mouth daily, Disp: 90 tablet, Rfl: 1    metFORMIN (GLUCOPHAGE) 500 mg tablet, Take 1 tablet (500 mg total) by mouth 2 (two) times a day with meals for 180 days, Disp: 360 tablet, Rfl: 0    metoprolol tartrate (LOPRESSOR) 25 mg tablet, Take 1 tablet (25 mg total) by mouth every 12 (twelve) hours, Disp: 180 tablet, Rfl: 0    Multiple Vitamin (MULTIVITAMIN) tablet, Take 1 tablet by mouth daily, Disp: , Rfl:     simvastatin (ZOCOR) 10 mg tablet, TAKE 1 TABLET (10 MG TOTAL) BY MOUTH DAILY AT BEDTIME, Disp: 90 tablet, Rfl: 0    Allergies   Allergen Reactions    Penicillins Vitals:    09/20/18 0818   BP: 120/70   Pulse: 77   Resp: 18   SpO2: 97%   Weight: 131 kg (289 lb)   Height: 5' 8" (1 727 m)

## 2018-09-20 NOTE — ASSESSMENT & PLAN NOTE
Uric acid is elevated, discussed with him to cut down purine diet and continue allopurinol and try to drink more water

## 2018-10-08 LAB
LEFT EYE DIABETIC RETINOPATHY: NORMAL
RIGHT EYE DIABETIC RETINOPATHY: NORMAL

## 2018-10-08 PROCEDURE — 3072F LOW RISK FOR RETINOPATHY: CPT | Performed by: FAMILY MEDICINE

## 2018-11-14 DIAGNOSIS — I10 ESSENTIAL HYPERTENSION: ICD-10-CM

## 2018-11-15 ENCOUNTER — OFFICE VISIT (OUTPATIENT)
Dept: GASTROENTEROLOGY | Facility: AMBULARY SURGERY CENTER | Age: 63
End: 2018-11-15
Payer: COMMERCIAL

## 2018-11-15 VITALS
WEIGHT: 298.6 LBS | RESPIRATION RATE: 18 BRPM | BODY MASS INDEX: 45.25 KG/M2 | SYSTOLIC BLOOD PRESSURE: 160 MMHG | TEMPERATURE: 98 F | HEART RATE: 84 BPM | DIASTOLIC BLOOD PRESSURE: 74 MMHG | HEIGHT: 68 IN

## 2018-11-15 DIAGNOSIS — Z12.11 SCREENING FOR COLON CANCER: ICD-10-CM

## 2018-11-15 PROCEDURE — 99243 OFF/OP CNSLTJ NEW/EST LOW 30: CPT | Performed by: INTERNAL MEDICINE

## 2018-11-15 NOTE — PATIENT INSTRUCTIONS
Patient was scheduled for a colonoscopy on 12/13/18 with Dr Nabil Sneed at [facility] patient was given Golyely/Dulcolax patient was also given the diabetic sheet with instructions

## 2018-11-15 NOTE — LETTER
November 17, 2018     Carolina Webb MD  [address]  [facility] [address]    Patient: Lesa Potts   YOB: 1955   Date of Visit: 11/15/2018       Dear Dr Fatemeh Connell:    Thank you for referring Kendell Bauer to me for evaluation  Below are my notes for this consultation  If you have questions, please do not hesitate to call me  I look forward to following your patient along with you  Sincerely,        Michelle Castro MD        CC: No Recipients  Michelle Castro MD  11/15/2018  3:49 PM  Sign at close encounter  Consultation - [facility]  Lesa Potts 1955 male         Chief Complaint:  For colonoscopy    HPI:  79-year-old male with history of diabetes mellitus, hypertension was referred for colonoscopy  Patient reports having colonoscopy few years ago and he was advised to come back in couple of years but he is not sure if it is because of polyps or suboptimal prep  He is also getting letters from his insurance company to get a colonoscopy ASAP  Patient has regular bowel movements and denies any blood or mucus in the stool  Appetite is good and denies any recent weight loss  Denies any abdominal pain, nausea, or vomiting  Has no heartburn or acid reflux  Denies any difficulty swallowing  REVIEW OF SYSTEMS: Review of Systems   Constitutional: Negative for activity change, appetite change, chills, diaphoresis, fatigue, fever and unexpected weight change  HENT: Negative for ear discharge, ear pain, facial swelling, hearing loss, nosebleeds, sore throat, tinnitus and voice change  Eyes: Negative for pain, discharge, redness, itching and visual disturbance  Respiratory: Negative for apnea, cough, chest tightness, shortness of breath and wheezing  Cardiovascular: Negative for chest pain and palpitations  Gastrointestinal:        As noted in HPI   Endocrine: Negative for cold intolerance, heat intolerance and polyuria     Genitourinary: Negative for difficulty urinating, dysuria, flank pain, hematuria and urgency  Musculoskeletal: Negative for arthralgias, back pain, gait problem, joint swelling and myalgias  Skin: Negative for rash and wound  Neurological: Negative for dizziness, tremors, seizures, speech difficulty, light-headedness, numbness and headaches  Hematological: Negative for adenopathy  Does not bruise/bleed easily  Psychiatric/Behavioral: Negative for agitation, behavioral problems and confusion  The patient is not nervous/anxious  Past Medical History:   Diagnosis Date    Diabetes mellitus ([facility] [address] )     Gout     Hypertension     Leukocytosis     Onset:4/26/2012    Onychomycosis     Onse:4/26/2012    Other muscle spasm     Renal calculi     Shoulder joint pain       Past Surgical History:   Procedure Laterality Date    CARPAL TUNNEL RELEASE Bilateral     Last Assessed:9/26/2014    CYSTOSCOPY      HERNIA REPAIR      Femoral Hernia Repair Unilateral    OH CYSTO/URETERO W/LITHOTRIPSY &INDWELL STENT INSRT Left 8/11/2016    Procedure: CYSTOSCOPY URETEROSCOPY,RETROGRADE PYELOGRAM AND INSERTION STENT URETERAL;  Surgeon: Mona Kendall MD;  [facility];  Service: Urology    OH FRAGMENT KIDNEY STONE/ ESWL Right 11/17/2016    Procedure: ESWL;  Surgeon: oMna Kendall MD;  [facility];  Service: Urology     Social History     Social History    Marital status: Single     Spouse name: N/A    Number of children: N/A    Years of education: N/A     Occupational History    Not on file       Social History Main Topics    Smoking status: Never Smoker    Smokeless tobacco: Never Used    Alcohol use Yes      Comment: occasional social 1-2x per year    Drug use: No    Sexual activity: Not on file     Other Topics Concern    Not on file     Social History Narrative    Living independently alone     Family History   Problem Relation Age of Onset    Coronary artery disease Mother     Heart disease Father     Lung cancer Father     Cancer Father     Stomach cancer Brother      Penicillins  Current Outpatient Prescriptions   Medication Sig Dispense Refill    allopurinol (ZYLOPRIM) 100 mg tablet Take 1 tablet (100 mg total) by mouth daily 90 tablet 1    cholecalciferol (VITAMIN D3) 1,000 units tablet Take 1,000 Units by mouth daily      CINNAMON PO Take 1,000 mg by mouth daily      glipiZIDE (GLUCOTROL) 5 mg tablet Take 1 tablet (5 mg total) by mouth daily 90 tablet 1    lisinopril (ZESTRIL) 10 mg tablet Take 1 tablet (10 mg total) by mouth daily 90 tablet 1    metFORMIN (GLUCOPHAGE) 500 mg tablet Take 1 tablet (500 mg total) by mouth 2 (two) times a day with meals for 180 days 360 tablet 0    metoprolol tartrate (LOPRESSOR) 25 mg tablet TAKE 1 TABLET (25 MG TOTAL) BY MOUTH EVERY 12 (TWELVE) HOURS 180 tablet 1    Multiple Vitamin (MULTIVITAMIN) tablet Take 1 tablet by mouth daily      simvastatin (ZOCOR) 10 mg tablet TAKE 1 TABLET (10 MG TOTAL) BY MOUTH DAILY AT BEDTIME 90 tablet 0    bisacodyl (DULCOLAX) 5 mg EC tablet Take 2 tablets (10 mg total) by mouth once for 1 dose 2 tablet 0    polyethylene glycol (GOLYTELY) 4000 mL solution Take 4,000 mL by mouth once for 1 dose 4000 mL 0     No current facility-administered medications for this visit  Blood pressure 160/74, pulse 84, temperature 98 °F (36 7 °C), temperature source Tympanic, resp  rate 18, height 5' 8" (1 727 m), weight 135 kg (298 lb 9 6 oz)  PHYSICAL EXAM: Physical Exam   Constitutional: He is oriented to person, place, and time  He appears well-developed  HENT:   Head: Normocephalic and atraumatic  Mouth/Throat: Oropharynx is clear and moist    Eyes: Pupils are equal, round, and reactive to light  Conjunctivae are normal  Right eye exhibits no discharge  Left eye exhibits no discharge  No scleral icterus  Neck: Neck supple  No JVD present  No tracheal deviation present  No thyromegaly present     Cardiovascular: Normal rate, regular rhythm, normal heart sounds and intact distal pulses  Exam reveals no gallop and no friction rub  No murmur heard  Pulmonary/Chest: Effort normal and breath sounds normal  No respiratory distress  He has no wheezes  He has no rales  He exhibits no tenderness  Abdominal: Soft  Bowel sounds are normal  He exhibits no distension and no mass  There is no tenderness  There is no rebound and no guarding  No hernia  Musculoskeletal: He exhibits no edema  Lymphadenopathy:     He has no cervical adenopathy  Neurological: He is alert and oriented to person, place, and time  Skin: Skin is warm and dry  No rash noted  No erythema  Psychiatric: He has a normal mood and affect  His behavior is normal  Thought content normal         Lab Results   Component Value Date    WBC 9 67 09/18/2018    HGB 12 3 09/18/2018    HCT 38 7 09/18/2018    MCV 91 09/18/2018     09/18/2018     Lab Results   Component Value Date    GLUCOSE 142 (H) 06/06/2014    CALCIUM 9 3 09/18/2018     08/01/2016    K 4 3 09/18/2018    CO2 27 09/18/2018     09/18/2018    BUN 22 09/18/2018    CREATININE 1 51 (H) 09/18/2018     Lab Results   Component Value Date    ALT 38 09/18/2018    AST 22 09/18/2018    ALKPHOS 81 09/18/2018    BILITOT 0 4 08/01/2016     Lab Results   Component Value Date    INR 1 03 11/04/2016    PROTIME 13 3 11/04/2016       [facility]    Result Date: 5/25/2018  Impression: No hydronephrosis or discernible shadowing calculi  Left renal simple cysts  Workstation performed: [id number]       ASSESSMENT & PLAN:    Screening for colon cancer  Screening for colon cancer - patient is at average risk for colon cancer screening  Rule out colorectal lesions including polyps or malignancy  Patient was advised to have a colonoscopy in couple of years after his last 1 few years ago     -Schedule for colonoscopy      -High-fiber diet     -Patient was given instructions about the colonoscopy prep     -Patient was explained about  the risks and benefits of the procedure  Risks including but not limited to bleeding, infection, perforation were explained in detail  Also explained about less than 100% sensitivity with the exam and other alternatives

## 2018-11-15 NOTE — PROGRESS NOTES
Consultation - [address] [facility]  Galo Mendieta 1955 male         Chief Complaint:  For colonoscopy    HPI:  31-year-old male with history of diabetes mellitus, hypertension was referred for colonoscopy  Patient reports having colonoscopy few years ago and he was advised to come back in couple of years but he is not sure if it is because of polyps or suboptimal prep  He is also getting letters from his insurance company to get a colonoscopy ASAP  Patient has regular bowel movements and denies any blood or mucus in the stool  Appetite is good and denies any recent weight loss  Denies any abdominal pain, nausea, or vomiting  Has no heartburn or acid reflux  Denies any difficulty swallowing  REVIEW OF SYSTEMS: Review of Systems   Constitutional: Negative for activity change, appetite change, chills, diaphoresis, fatigue, fever and unexpected weight change  HENT: Negative for ear discharge, ear pain, facial swelling, hearing loss, nosebleeds, sore throat, tinnitus and voice change  Eyes: Negative for pain, discharge, redness, itching and visual disturbance  Respiratory: Negative for apnea, cough, chest tightness, shortness of breath and wheezing  Cardiovascular: Negative for chest pain and palpitations  Gastrointestinal:        As noted in HPI   Endocrine: Negative for cold intolerance, heat intolerance and polyuria  Genitourinary: Negative for difficulty urinating, dysuria, flank pain, hematuria and urgency  Musculoskeletal: Negative for arthralgias, back pain, gait problem, joint swelling and myalgias  Skin: Negative for rash and wound  Neurological: Negative for dizziness, tremors, seizures, speech difficulty, light-headedness, numbness and headaches  Hematological: Negative for adenopathy  Does not bruise/bleed easily  Psychiatric/Behavioral: Negative for agitation, behavioral problems and confusion  The patient is not nervous/anxious           Past Medical History:   Diagnosis Date    Diabetes mellitus ([facility] [address] )     Gout     Hypertension     Leukocytosis     Onset:4/26/2012    Onychomycosis     Onse:4/26/2012    Other muscle spasm     Renal calculi     Shoulder joint pain       Past Surgical History:   Procedure Laterality Date    CARPAL TUNNEL RELEASE Bilateral     Last Assessed:9/26/2014    CYSTOSCOPY      HERNIA REPAIR      Femoral Hernia Repair Unilateral    MA CYSTO/URETERO W/LITHOTRIPSY &INDWELL STENT INSRT Left 8/11/2016    Procedure: CYSTOSCOPY URETEROSCOPY,RETROGRADE PYELOGRAM AND INSERTION STENT URETERAL;  Surgeon: Ann Riley MD;  [facility];  Service: Urology    MA FRAGMENT KIDNEY STONE/ ESWL Right 11/17/2016    Procedure: ESWL;  Surgeon: Ann Riley MD;  [facility];  Service: Urology     Social History     Social History    Marital status: Single     Spouse name: N/A    Number of children: N/A    Years of education: N/A     Occupational History    Not on file       Social History Main Topics    Smoking status: Never Smoker    Smokeless tobacco: Never Used    Alcohol use Yes      Comment: occasional social 1-2x per year    Drug use: No    Sexual activity: Not on file     Other Topics Concern    Not on file     Social History Narrative    Living independently alone     Family History   Problem Relation Age of Onset    Coronary artery disease Mother     Heart disease Father     Lung cancer Father     Cancer Father     Stomach cancer Brother      Penicillins  Current Outpatient Prescriptions   Medication Sig Dispense Refill    allopurinol (ZYLOPRIM) 100 mg tablet Take 1 tablet (100 mg total) by mouth daily 90 tablet 1    cholecalciferol (VITAMIN D3) 1,000 units tablet Take 1,000 Units by mouth daily      CINNAMON PO Take 1,000 mg by mouth daily      glipiZIDE (GLUCOTROL) 5 mg tablet Take 1 tablet (5 mg total) by mouth daily 90 tablet 1    lisinopril (ZESTRIL) 10 mg tablet Take 1 tablet (10 mg total) by mouth daily 90 tablet 1    metFORMIN (GLUCOPHAGE) 500 mg tablet Take 1 tablet (500 mg total) by mouth 2 (two) times a day with meals for 180 days 360 tablet 0    metoprolol tartrate (LOPRESSOR) 25 mg tablet TAKE 1 TABLET (25 MG TOTAL) BY MOUTH EVERY 12 (TWELVE) HOURS 180 tablet 1    Multiple Vitamin (MULTIVITAMIN) tablet Take 1 tablet by mouth daily      simvastatin (ZOCOR) 10 mg tablet TAKE 1 TABLET (10 MG TOTAL) BY MOUTH DAILY AT BEDTIME 90 tablet 0    bisacodyl (DULCOLAX) 5 mg EC tablet Take 2 tablets (10 mg total) by mouth once for 1 dose 2 tablet 0    polyethylene glycol (GOLYTELY) 4000 mL solution Take 4,000 mL by mouth once for 1 dose 4000 mL 0     No current facility-administered medications for this visit  Blood pressure 160/74, pulse 84, temperature 98 °F (36 7 °C), temperature source Tympanic, resp  rate 18, height 5' 8" (1 727 m), weight 135 kg (298 lb 9 6 oz)  PHYSICAL EXAM: Physical Exam   Constitutional: He is oriented to person, place, and time  He appears well-developed  HENT:   Head: Normocephalic and atraumatic  Mouth/Throat: Oropharynx is clear and moist    Eyes: Pupils are equal, round, and reactive to light  Conjunctivae are normal  Right eye exhibits no discharge  Left eye exhibits no discharge  No scleral icterus  Neck: Neck supple  No JVD present  No tracheal deviation present  No thyromegaly present  Cardiovascular: Normal rate, regular rhythm, normal heart sounds and intact distal pulses  Exam reveals no gallop and no friction rub  No murmur heard  Pulmonary/Chest: Effort normal and breath sounds normal  No respiratory distress  He has no wheezes  He has no rales  He exhibits no tenderness  Abdominal: Soft  Bowel sounds are normal  He exhibits no distension and no mass  There is no tenderness  There is no rebound and no guarding  No hernia  Musculoskeletal: He exhibits no edema  Lymphadenopathy:     He has no cervical adenopathy     Neurological: He is alert and oriented to person, place, and time  Skin: Skin is warm and dry  No rash noted  No erythema  Psychiatric: He has a normal mood and affect  His behavior is normal  Thought content normal         Lab Results   Component Value Date    WBC 9 67 09/18/2018    HGB 12 3 09/18/2018    HCT 38 7 09/18/2018    MCV 91 09/18/2018     09/18/2018     Lab Results   Component Value Date    GLUCOSE 142 (H) 06/06/2014    CALCIUM 9 3 09/18/2018     08/01/2016    K 4 3 09/18/2018    CO2 27 09/18/2018     09/18/2018    BUN 22 09/18/2018    CREATININE 1 51 (H) 09/18/2018     Lab Results   Component Value Date    ALT 38 09/18/2018    AST 22 09/18/2018    ALKPHOS 81 09/18/2018    BILITOT 0 4 08/01/2016     Lab Results   Component Value Date    INR 1 03 11/04/2016    PROTIME 13 3 11/04/2016       [facility]    Result Date: 5/25/2018  Impression: No hydronephrosis or discernible shadowing calculi  Left renal simple cysts  Workstation performed: [id number]       ASSESSMENT & PLAN:    Screening for colon cancer  Screening for colon cancer - patient is at average risk for colon cancer screening  Rule out colorectal lesions including polyps or malignancy  Patient was advised to have a colonoscopy in couple of years after his last 1 few years ago     -Schedule for colonoscopy  -High-fiber diet     -Patient was given instructions about the colonoscopy prep     -Patient was explained about  the risks and benefits of the procedure  Risks including but not limited to bleeding, infection, perforation were explained in detail  Also explained about less than 100% sensitivity with the exam and other alternatives

## 2018-11-15 NOTE — ASSESSMENT & PLAN NOTE
Screening for colon cancer - patient is at average risk for colon cancer screening  Rule out colorectal lesions including polyps or malignancy  Patient was advised to have a colonoscopy in couple of years after his last 1 few years ago     -Schedule for colonoscopy  -High-fiber diet     -Patient was given instructions about the colonoscopy prep     -Patient was explained about  the risks and benefits of the procedure  Risks including but not limited to bleeding, infection, perforation were explained in detail  Also explained about less than 100% sensitivity with the exam and other alternatives

## 2018-11-21 DIAGNOSIS — E78.2 MIXED HYPERLIPIDEMIA: ICD-10-CM

## 2018-11-25 RX ORDER — SIMVASTATIN 10 MG
10 TABLET ORAL
Qty: 90 TABLET | Refills: 0 | Status: SHIPPED | OUTPATIENT
Start: 2018-11-25 | End: 2019-02-18 | Stop reason: SDUPTHER

## 2018-11-29 ENCOUNTER — ANESTHESIA EVENT (OUTPATIENT)
Dept: PERIOP | Facility: AMBULARY SURGERY CENTER | Age: 63
End: 2018-11-29
Payer: COMMERCIAL

## 2018-12-13 ENCOUNTER — ANESTHESIA (OUTPATIENT)
Dept: PERIOP | Facility: AMBULARY SURGERY CENTER | Age: 63
End: 2018-12-13
Payer: COMMERCIAL

## 2018-12-13 ENCOUNTER — HOSPITAL ENCOUNTER (OUTPATIENT)
Facility: AMBULARY SURGERY CENTER | Age: 63
Setting detail: OUTPATIENT SURGERY
Discharge: HOME | End: 2018-12-13
Attending: INTERNAL MEDICINE | Admitting: INTERNAL MEDICINE
Payer: COMMERCIAL

## 2018-12-13 VITALS
HEART RATE: 71 BPM | SYSTOLIC BLOOD PRESSURE: 133 MMHG | TEMPERATURE: 97.9 F | BODY MASS INDEX: 43.19 KG/M2 | RESPIRATION RATE: 16 BRPM | DIASTOLIC BLOOD PRESSURE: 71 MMHG | WEIGHT: 285 LBS | OXYGEN SATURATION: 99 % | HEIGHT: 68 IN

## 2018-12-13 PROCEDURE — G0105 COLORECTAL SCRN; HI RISK IND: HCPCS | Performed by: INTERNAL MEDICINE

## 2018-12-13 RX ORDER — SODIUM CHLORIDE 9 MG/ML
100 INJECTION, SOLUTION INTRAVENOUS CONTINUOUS
Status: DISCONTINUED | OUTPATIENT
Start: 2018-12-13 | End: 2018-12-13 | Stop reason: HOSPADM

## 2018-12-13 RX ORDER — PROPOFOL 10 MG/ML
INJECTION, EMULSION INTRAVENOUS AS NEEDED
Status: DISCONTINUED | OUTPATIENT
Start: 2018-12-13 | End: 2018-12-13 | Stop reason: SURG

## 2018-12-13 RX ORDER — PROPOFOL 10 MG/ML
INJECTION, EMULSION INTRAVENOUS CONTINUOUS PRN
Status: DISCONTINUED | OUTPATIENT
Start: 2018-12-13 | End: 2018-12-13 | Stop reason: SURG

## 2018-12-13 RX ADMIN — PROPOFOL 130 MCG/KG/MIN: 10 INJECTION, EMULSION INTRAVENOUS at 11:15

## 2018-12-13 RX ADMIN — SODIUM CHLORIDE: 0.9 INJECTION, SOLUTION INTRAVENOUS at 10:50

## 2018-12-13 RX ADMIN — PROPOFOL 80 MG: 10 INJECTION, EMULSION INTRAVENOUS at 11:15

## 2018-12-13 NOTE — ANESTHESIA PREPROCEDURE EVALUATION
Review of Systems/Medical History   Patient summary reviewed   Chart reviewed  No history of anesthetic complications     Cardiovascular   Exercise tolerance (METS): >4,  Hyperlipidemia, Hypertension ,    Pulmonary   Negative pulmonary ROS        GI/Hepatic    Bowel prep       Kidney stones, Chronic kidney disease ,        Endo/Other  Diabetes () well controlled type 2 ,   Obesity (BMI 43)  morbid obesity   GYN       Hematology  Negative hematology ROS      Musculoskeletal  Gout,        Neurology  Negative neurology ROS      Psychology   Negative psychology ROS            Physical Exam    Airway  Comment:  Full beard  Mallampati score: I  TM Distance: >3 FB  Neck ROM: full     Dental   No notable dental hx     Cardiovascular      Pulmonary      Other Findings       Lab Results   Component Value Date    WBC 9 67 09/18/2018    HGB 12 3 09/18/2018     09/18/2018     Lab Results   Component Value Date     09/18/2018    K 4 3 09/18/2018    BUN 22 09/18/2018    CREATININE 1 51 (H) 09/18/2018    GLUCOSE 137 09/18/2018     Lab Results   Component Value Date    HGBA1C 6 0 09/18/2018   Anesthesia Plan  ASA Score- 3     Anesthesia Type-  IV sedation with anesthesia with ASA Monitors  Additional Monitors:   Airway Plan:         Plan Factors-    Induction-  intravenous  Postoperative Plan-     Informed Consent- Anesthetic plan and risks discussed with patient  I personally reviewed this patient with the CRNA  Discussed and agreed on the Anesthesia Plan with the CRNA  Angela Neely

## 2018-12-13 NOTE — OP NOTE
COLONOSCOPY    PROCEDURE: Colonoscopy    INDICATIONS: History of Colon Polyps    POST-OP DIAGNOSIS: See the impression below    SEDATION: Monitored anesthesia care, check anesthesia records    PHYSICAL EXAM:    /68   Pulse 80   Temp (!) 97 3 °F (36 3 °C) (Temporal)   Resp 20   Ht 5' 8" (1 727 m)   Wt 129 kg (285 lb)   SpO2 98%   BMI 43 33 kg/m²   Body mass index is 43 33 kg/m²  General: NAD  Heart: S1 & S2 normal, RRR  Lungs: CTA, No rales or rhonchi  Abdomen: Soft, nontender, nondistended, good bowel sounds    CONSENT:  Informed consent was obtained for the procedure, including sedation after explaining the risks and benefits of the procedure  Risks including but not limited to bleeding, perforation, infection, aspiration were discussed in detail  Also explained about less than 100%$ sensitivity with the exam and other alternatives  PREPARATION:   EKG tracing, pulse oximetry, blood pressure were monitored throughout the procedure  Patient was identified by myself both verbally and by visual inspection of ID band  DESCRIPTION:   Patient was placed in the left lateral decubitus position and was sedated with the above medication  Digital rectal examination was performed  The colonoscope was introduced in to the anal canal and advanced up to cecum, which was identified by the appendiceal orifice and IC valve  A careful inspection was made as the colonoscope was withdrawn, including a retroflexed view of the rectum; findings and interventions are described below  Appropriate photodocumentation was obtained  The quality of the colonic preparation was adequate  FINDINGS:    1  Cecum and ileocecal valve-normal mucosa    2  The rest of the colon mucosa is normal     3  Internal hemorrhoids           IMPRESSIONS:      As above    RECOMMENDATIONS:    Next colonoscopy in 5 years    COMPLICATIONS:  None; patient tolerated the procedure well      DISPOSITION: PACU           CONDITION: Stable

## 2018-12-13 NOTE — H&P (VIEW-ONLY)
Consultation - [facility]  Caity Freeze 1955 male         Chief Complaint:  For colonoscopy    HPI:  17-year-old male with history of diabetes mellitus, hypertension was referred for colonoscopy  Patient reports having colonoscopy few years ago and he was advised to come back in couple of years but he is not sure if it is because of polyps or suboptimal prep  He is also getting letters from his insurance company to get a colonoscopy ASAP  Patient has regular bowel movements and denies any blood or mucus in the stool  Appetite is good and denies any recent weight loss  Denies any abdominal pain, nausea, or vomiting  Has no heartburn or acid reflux  Denies any difficulty swallowing  REVIEW OF SYSTEMS: Review of Systems   Constitutional: Negative for activity change, appetite change, chills, diaphoresis, fatigue, fever and unexpected weight change  HENT: Negative for ear discharge, ear pain, facial swelling, hearing loss, nosebleeds, sore throat, tinnitus and voice change  Eyes: Negative for pain, discharge, redness, itching and visual disturbance  Respiratory: Negative for apnea, cough, chest tightness, shortness of breath and wheezing  Cardiovascular: Negative for chest pain and palpitations  Gastrointestinal:        As noted in HPI   Endocrine: Negative for cold intolerance, heat intolerance and polyuria  Genitourinary: Negative for difficulty urinating, dysuria, flank pain, hematuria and urgency  Musculoskeletal: Negative for arthralgias, back pain, gait problem, joint swelling and myalgias  Skin: Negative for rash and wound  Neurological: Negative for dizziness, tremors, seizures, speech difficulty, light-headedness, numbness and headaches  Hematological: Negative for adenopathy  Does not bruise/bleed easily  Psychiatric/Behavioral: Negative for agitation, behavioral problems and confusion  The patient is not nervous/anxious           Past Medical History:   Diagnosis Date    Diabetes mellitus ([facility] [address] )     Gout     Hypertension     Leukocytosis     Onset:4/26/2012    Onychomycosis     Onse:4/26/2012    Other muscle spasm     Renal calculi     Shoulder joint pain       Past Surgical History:   Procedure Laterality Date    CARPAL TUNNEL RELEASE Bilateral     Last Assessed:9/26/2014    CYSTOSCOPY      HERNIA REPAIR      Femoral Hernia Repair Unilateral    NE CYSTO/URETERO W/LITHOTRIPSY &INDWELL STENT INSRT Left 8/11/2016    Procedure: CYSTOSCOPY URETEROSCOPY,RETROGRADE PYELOGRAM AND INSERTION STENT URETERAL;  Surgeon: Shailesh Herrera MD;  [facility];  Service: Urology    NE FRAGMENT KIDNEY STONE/ ESWL Right 11/17/2016    Procedure: ESWL;  Surgeon: Shailesh Herrera MD;  [facility];  Service: Urology     Social History     Social History    Marital status: Single     Spouse name: N/A    Number of children: N/A    Years of education: N/A     Occupational History    Not on file       Social History Main Topics    Smoking status: Never Smoker    Smokeless tobacco: Never Used    Alcohol use Yes      Comment: occasional social 1-2x per year    Drug use: No    Sexual activity: Not on file     Other Topics Concern    Not on file     Social History Narrative    Living independently alone     Family History   Problem Relation Age of Onset    Coronary artery disease Mother     Heart disease Father     Lung cancer Father     Cancer Father     Stomach cancer Brother      Penicillins  Current Outpatient Prescriptions   Medication Sig Dispense Refill    allopurinol (ZYLOPRIM) 100 mg tablet Take 1 tablet (100 mg total) by mouth daily 90 tablet 1    cholecalciferol (VITAMIN D3) 1,000 units tablet Take 1,000 Units by mouth daily      CINNAMON PO Take 1,000 mg by mouth daily      glipiZIDE (GLUCOTROL) 5 mg tablet Take 1 tablet (5 mg total) by mouth daily 90 tablet 1    lisinopril (ZESTRIL) 10 mg tablet Take 1 tablet (10 mg total) by mouth daily 90 tablet 1    metFORMIN (GLUCOPHAGE) 500 mg tablet Take 1 tablet (500 mg total) by mouth 2 (two) times a day with meals for 180 days 360 tablet 0    metoprolol tartrate (LOPRESSOR) 25 mg tablet TAKE 1 TABLET (25 MG TOTAL) BY MOUTH EVERY 12 (TWELVE) HOURS 180 tablet 1    Multiple Vitamin (MULTIVITAMIN) tablet Take 1 tablet by mouth daily      simvastatin (ZOCOR) 10 mg tablet TAKE 1 TABLET (10 MG TOTAL) BY MOUTH DAILY AT BEDTIME 90 tablet 0    bisacodyl (DULCOLAX) 5 mg EC tablet Take 2 tablets (10 mg total) by mouth once for 1 dose 2 tablet 0    polyethylene glycol (GOLYTELY) 4000 mL solution Take 4,000 mL by mouth once for 1 dose 4000 mL 0     No current facility-administered medications for this visit  Blood pressure 160/74, pulse 84, temperature 98 °F (36 7 °C), temperature source Tympanic, resp  rate 18, height 5' 8" (1 727 m), weight 135 kg (298 lb 9 6 oz)  PHYSICAL EXAM: Physical Exam   Constitutional: He is oriented to person, place, and time  He appears well-developed  HENT:   Head: Normocephalic and atraumatic  Mouth/Throat: Oropharynx is clear and moist    Eyes: Pupils are equal, round, and reactive to light  Conjunctivae are normal  Right eye exhibits no discharge  Left eye exhibits no discharge  No scleral icterus  Neck: Neck supple  No JVD present  No tracheal deviation present  No thyromegaly present  Cardiovascular: Normal rate, regular rhythm, normal heart sounds and intact distal pulses  Exam reveals no gallop and no friction rub  No murmur heard  Pulmonary/Chest: Effort normal and breath sounds normal  No respiratory distress  He has no wheezes  He has no rales  He exhibits no tenderness  Abdominal: Soft  Bowel sounds are normal  He exhibits no distension and no mass  There is no tenderness  There is no rebound and no guarding  No hernia  Musculoskeletal: He exhibits no edema  Lymphadenopathy:     He has no cervical adenopathy     Neurological: He is alert and oriented to person, place, and time  Skin: Skin is warm and dry  No rash noted  No erythema  Psychiatric: He has a normal mood and affect  His behavior is normal  Thought content normal         Lab Results   Component Value Date    WBC 9 67 09/18/2018    HGB 12 3 09/18/2018    HCT 38 7 09/18/2018    MCV 91 09/18/2018     09/18/2018     Lab Results   Component Value Date    GLUCOSE 142 (H) 06/06/2014    CALCIUM 9 3 09/18/2018     08/01/2016    K 4 3 09/18/2018    CO2 27 09/18/2018     09/18/2018    BUN 22 09/18/2018    CREATININE 1 51 (H) 09/18/2018     Lab Results   Component Value Date    ALT 38 09/18/2018    AST 22 09/18/2018    ALKPHOS 81 09/18/2018    BILITOT 0 4 08/01/2016     Lab Results   Component Value Date    INR 1 03 11/04/2016    PROTIME 13 3 11/04/2016       [facility]    Result Date: 5/25/2018  Impression: No hydronephrosis or discernible shadowing calculi  Left renal simple cysts  Workstation performed: [id number]       ASSESSMENT & PLAN:    Screening for colon cancer  Screening for colon cancer - patient is at average risk for colon cancer screening  Rule out colorectal lesions including polyps or malignancy  Patient was advised to have a colonoscopy in couple of years after his last 1 few years ago     -Schedule for colonoscopy  -High-fiber diet     -Patient was given instructions about the colonoscopy prep     -Patient was explained about  the risks and benefits of the procedure  Risks including but not limited to bleeding, infection, perforation were explained in detail  Also explained about less than 100% sensitivity with the exam and other alternatives

## 2018-12-13 NOTE — ANESTHESIA POSTPROCEDURE EVALUATION
Post-Op Assessment Note      CV Status:  Stable    Mental Status:  Awake    Hydration Status:  Stable    PONV Controlled:  Controlled    Airway Patency:  Patent    Post Op Vitals Reviewed: Yes          Staff: CRNA           /59 (12/13/18 1138)    Temp 97 9 °F (36 6 °C) (12/13/18 1138)    Pulse 78 (12/13/18 1138)   Resp 16 (12/13/18 1138)    SpO2 98 % (12/13/18 1138)

## 2018-12-20 ENCOUNTER — TRANSCRIBE ORDERS (OUTPATIENT)
Dept: LAB | Facility: CLINIC | Age: 63
End: 2018-12-20

## 2018-12-20 ENCOUNTER — APPOINTMENT (OUTPATIENT)
Dept: LAB | Facility: CLINIC | Age: 63
End: 2018-12-20
Payer: COMMERCIAL

## 2018-12-20 DIAGNOSIS — M1A.9XX0 CHRONIC GOUT WITHOUT TOPHUS, UNSPECIFIED CAUSE, UNSPECIFIED SITE: ICD-10-CM

## 2018-12-20 DIAGNOSIS — E78.2 MIXED HYPERLIPIDEMIA: ICD-10-CM

## 2018-12-20 DIAGNOSIS — E08.9 DIABETES MELLITUS DUE TO UNDERLYING CONDITION WITHOUT COMPLICATION, WITHOUT LONG-TERM CURRENT USE OF INSULIN (HCC): ICD-10-CM

## 2018-12-20 LAB
ALBUMIN SERPL BCP-MCNC: 3.4 G/DL (ref 3.5–5)
ALP SERPL-CCNC: 92 U/L (ref 46–116)
ALT SERPL W P-5'-P-CCNC: 28 U/L (ref 12–78)
ANION GAP SERPL CALCULATED.3IONS-SCNC: 6 MMOL/L (ref 4–13)
AST SERPL W P-5'-P-CCNC: 16 U/L (ref 5–45)
BASOPHILS # BLD AUTO: 0.05 THOUSANDS/ΜL (ref 0–0.1)
BASOPHILS NFR BLD AUTO: 1 % (ref 0–1)
BILIRUB SERPL-MCNC: 0.2 MG/DL (ref 0.2–1)
BUN SERPL-MCNC: 30 MG/DL (ref 5–25)
CALCIUM SERPL-MCNC: 9.7 MG/DL (ref 8.3–10.1)
CHLORIDE SERPL-SCNC: 102 MMOL/L (ref 100–108)
CHOLEST SERPL-MCNC: 112 MG/DL (ref 50–200)
CO2 SERPL-SCNC: 30 MMOL/L (ref 21–32)
CREAT SERPL-MCNC: 1.52 MG/DL (ref 0.6–1.3)
CREAT UR-MCNC: 75.6 MG/DL
EOSINOPHIL # BLD AUTO: 0.23 THOUSAND/ΜL (ref 0–0.61)
EOSINOPHIL NFR BLD AUTO: 2 % (ref 0–6)
ERYTHROCYTE [DISTWIDTH] IN BLOOD BY AUTOMATED COUNT: 14.1 % (ref 11.6–15.1)
EST. AVERAGE GLUCOSE BLD GHB EST-MCNC: 143 MG/DL
GFR SERPL CREATININE-BSD FRML MDRD: 48 ML/MIN/1.73SQ M
GLUCOSE P FAST SERPL-MCNC: 150 MG/DL (ref 65–99)
HBA1C MFR BLD: 6.6 % (ref 4.2–6.3)
HCT VFR BLD AUTO: 41 % (ref 36.5–49.3)
HDLC SERPL-MCNC: 37 MG/DL (ref 40–60)
HGB BLD-MCNC: 12.8 G/DL (ref 12–17)
IMM GRANULOCYTES # BLD AUTO: 0.07 THOUSAND/UL (ref 0–0.2)
IMM GRANULOCYTES NFR BLD AUTO: 1 % (ref 0–2)
LDLC SERPL CALC-MCNC: 51 MG/DL (ref 0–100)
LYMPHOCYTES # BLD AUTO: 2.49 THOUSANDS/ΜL (ref 0.6–4.47)
LYMPHOCYTES NFR BLD AUTO: 23 % (ref 14–44)
MCH RBC QN AUTO: 28.6 PG (ref 26.8–34.3)
MCHC RBC AUTO-ENTMCNC: 31.2 G/DL (ref 31.4–37.4)
MCV RBC AUTO: 92 FL (ref 82–98)
MICROALBUMIN UR-MCNC: 17.1 MG/L (ref 0–20)
MICROALBUMIN/CREAT 24H UR: 23 MG/G CREATININE (ref 0–30)
MONOCYTES # BLD AUTO: 0.82 THOUSAND/ΜL (ref 0.17–1.22)
MONOCYTES NFR BLD AUTO: 8 % (ref 4–12)
NEUTROPHILS # BLD AUTO: 7.29 THOUSANDS/ΜL (ref 1.85–7.62)
NEUTS SEG NFR BLD AUTO: 65 % (ref 43–75)
NONHDLC SERPL-MCNC: 75 MG/DL
NRBC BLD AUTO-RTO: 0 /100 WBCS
PLATELET # BLD AUTO: 211 THOUSANDS/UL (ref 149–390)
PMV BLD AUTO: 11 FL (ref 8.9–12.7)
POTASSIUM SERPL-SCNC: 5.2 MMOL/L (ref 3.5–5.3)
PROT SERPL-MCNC: 7.2 G/DL (ref 6.4–8.2)
RBC # BLD AUTO: 4.48 MILLION/UL (ref 3.88–5.62)
SODIUM SERPL-SCNC: 138 MMOL/L (ref 136–145)
TRIGL SERPL-MCNC: 122 MG/DL
URATE SERPL-MCNC: 7.3 MG/DL (ref 4.2–8)
WBC # BLD AUTO: 10.95 THOUSAND/UL (ref 4.31–10.16)

## 2018-12-20 PROCEDURE — 85025 COMPLETE CBC W/AUTO DIFF WBC: CPT

## 2018-12-20 PROCEDURE — 82043 UR ALBUMIN QUANTITATIVE: CPT | Performed by: FAMILY MEDICINE

## 2018-12-20 PROCEDURE — 84550 ASSAY OF BLOOD/URIC ACID: CPT

## 2018-12-20 PROCEDURE — 80053 COMPREHEN METABOLIC PANEL: CPT

## 2018-12-20 PROCEDURE — 82570 ASSAY OF URINE CREATININE: CPT | Performed by: FAMILY MEDICINE

## 2018-12-20 PROCEDURE — 80061 LIPID PANEL: CPT

## 2018-12-20 PROCEDURE — 83036 HEMOGLOBIN GLYCOSYLATED A1C: CPT

## 2018-12-20 PROCEDURE — 36415 COLL VENOUS BLD VENIPUNCTURE: CPT

## 2019-01-14 ENCOUNTER — OFFICE VISIT (OUTPATIENT)
Dept: FAMILY MEDICINE CLINIC | Facility: CLINIC | Age: 64
End: 2019-01-14
Payer: COMMERCIAL

## 2019-01-14 VITALS
DIASTOLIC BLOOD PRESSURE: 70 MMHG | SYSTOLIC BLOOD PRESSURE: 120 MMHG | OXYGEN SATURATION: 98 % | HEART RATE: 68 BPM | RESPIRATION RATE: 18 BRPM | HEIGHT: 68 IN | WEIGHT: 300 LBS | BODY MASS INDEX: 45.47 KG/M2

## 2019-01-14 DIAGNOSIS — E11.22 TYPE 2 DIABETES MELLITUS WITH STAGE 2 CHRONIC KIDNEY DISEASE, WITHOUT LONG-TERM CURRENT USE OF INSULIN (HCC): Primary | ICD-10-CM

## 2019-01-14 DIAGNOSIS — M1A.9XX0 CHRONIC GOUT WITHOUT TOPHUS, UNSPECIFIED CAUSE, UNSPECIFIED SITE: ICD-10-CM

## 2019-01-14 DIAGNOSIS — I10 BENIGN ESSENTIAL HYPERTENSION: ICD-10-CM

## 2019-01-14 DIAGNOSIS — N20.0 NEPHROLITHIASIS: ICD-10-CM

## 2019-01-14 DIAGNOSIS — B35.1 ONYCHOMYCOSIS OF TOENAIL: ICD-10-CM

## 2019-01-14 DIAGNOSIS — N18.2 TYPE 2 DIABETES MELLITUS WITH STAGE 2 CHRONIC KIDNEY DISEASE, WITHOUT LONG-TERM CURRENT USE OF INSULIN (HCC): Primary | ICD-10-CM

## 2019-01-14 PROCEDURE — 99214 OFFICE O/P EST MOD 30 MIN: CPT | Performed by: FAMILY MEDICINE

## 2019-01-14 PROCEDURE — 3078F DIAST BP <80 MM HG: CPT | Performed by: FAMILY MEDICINE

## 2019-01-14 PROCEDURE — 3074F SYST BP LT 130 MM HG: CPT | Performed by: FAMILY MEDICINE

## 2019-01-14 PROCEDURE — 3008F BODY MASS INDEX DOCD: CPT | Performed by: FAMILY MEDICINE

## 2019-01-14 PROCEDURE — 4010F ACE/ARB THERAPY RXD/TAKEN: CPT | Performed by: FAMILY MEDICINE

## 2019-01-14 RX ORDER — LISINOPRIL 10 MG/1
TABLET ORAL
Qty: 90 TABLET | Refills: 1 | Status: SHIPPED | OUTPATIENT
Start: 2019-01-14 | End: 2019-07-15 | Stop reason: SDUPTHER

## 2019-01-14 RX ORDER — ALLOPURINOL 100 MG/1
TABLET ORAL
Qty: 90 TABLET | Refills: 1 | Status: SHIPPED | OUTPATIENT
Start: 2019-01-14 | End: 2019-07-15 | Stop reason: SDUPTHER

## 2019-01-14 NOTE — ASSESSMENT & PLAN NOTE
His blood pressure is stable, he will continue same medications and he should watch his salt not more than 2 g salt per day

## 2019-01-14 NOTE — ASSESSMENT & PLAN NOTE
So he has a podiatrist and he will see him, he has onychomycosis in toenails and he has slight change in the skin between 4th and 5th toe, advised to use Lamisil over-the-counter and keep the skin dry

## 2019-01-14 NOTE — PROGRESS NOTES
Assessment/Plan:    Problem List Items Addressed This Visit        Endocrine    Type 2 diabetes mellitus with stage 2 chronic kidney disease, without long-term current use of insulin ([facility] [address] ) - Primary     Lab Results   Component Value Date    HGBA1C 6 6 (H) 12/20/2018     Hemoglobin A1c went up from last time, discussed with about low carb diet and he should increase his physical activity  Recheck labs in 3 months  And follow-up, encouraged to drink plenty of fluids every day  His creatinine is 1 5 which has been stable and he follows urologist as he had passed kidney stone in the past  No results for input(s): POCGLU in the last 72 hours  Blood Sugar Average: Last 72 hrs:           Relevant Orders    CBC and differential    Comprehensive metabolic panel    Hemoglobin A1C    Lipid panel    TSH, 3rd generation       Cardiovascular and Mediastinum    Benign essential hypertension     His blood pressure is stable, he will continue same medications and he should watch his salt not more than 2 g salt per day         Relevant Orders    TSH, 3rd generation       Musculoskeletal and Integument    Onychomycosis of toenail     So he has a podiatrist and he will see him, he has onychomycosis in toenails and he has slight change in the skin between 4th and 5th toe, advised to use Lamisil over-the-counter and keep the skin dry            Other    Gout     His uric acid improved, he would continue drinking more fluids and he should not be eating red meat or lot of few purine         Relevant Orders    Uric acid          Chief Complaint   Patient presents with    Follow-up       Subjective:   Patient ID: Audi Helton is a 61 y o  male  He is here follow-up on his diabetes, hypertension and he has gained weight since last visit, he has no gout attack, he has chronic kidney disease which has been stable    He tries to drink plenty of fluids, he denies any chest pain shortness of breath, denies any abdominal pain, no change in urine or bowel movements  Review of Systems   Constitutional: Negative for activity change, appetite change, chills, diaphoresis, fatigue, fever and unexpected weight change  HENT: Negative for congestion, dental problem, ear discharge, ear pain, facial swelling, hearing loss, mouth sores, nosebleeds, postnasal drip, rhinorrhea, sinus pain, sinus pressure, sneezing, sore throat, trouble swallowing and voice change  Eyes: Negative for photophobia, pain, discharge, redness and itching  Respiratory: Negative for cough, chest tightness, shortness of breath and wheezing  Cardiovascular: Negative for chest pain, palpitations and leg swelling  Gastrointestinal: Negative for abdominal distention, abdominal pain, blood in stool, constipation, diarrhea and nausea  Endocrine: Negative for cold intolerance, heat intolerance, polydipsia, polyphagia and polyuria  Genitourinary: Negative for dysuria, flank pain, frequency, hematuria and urgency  Musculoskeletal: Negative for arthralgias, back pain, myalgias and neck pain  Skin: Negative for color change and pallor  Allergic/Immunologic: Negative for environmental allergies and food allergies  Neurological: Negative for dizziness, weakness, light-headedness, numbness and headaches  Hematological: Negative for adenopathy  Does not bruise/bleed easily  Psychiatric/Behavioral: Negative for behavioral problems, sleep disturbance and suicidal ideas  The patient is not nervous/anxious  Objective:  Physical Exam   Constitutional: He is oriented to person, place, and time  He appears well-developed and well-nourished  HENT:   Head: Normocephalic and atraumatic  Nose: Nose normal    Mouth/Throat: Oropharynx is clear and moist  No oropharyngeal exudate  Eyes: Pupils are equal, round, and reactive to light  Conjunctivae and EOM are normal  Right eye exhibits no discharge  Left eye exhibits no discharge  No scleral icterus     Neck: Normal range of motion  Neck supple  No tracheal deviation present  No thyromegaly present  Cardiovascular: Normal rate, regular rhythm and normal heart sounds  No murmur heard  Pulses:       Dorsalis pedis pulses are 2+ on the right side, and 2+ on the left side  Pulmonary/Chest: Effort normal and breath sounds normal  No respiratory distress  He has no wheezes  He has no rales  Abdominal: Soft  Bowel sounds are normal  He exhibits no distension and no mass  There is no tenderness  There is no rebound  Musculoskeletal: Normal range of motion  He exhibits edema  Feet:   Right Foot:   Skin Integrity: Negative for ulcer, skin breakdown, erythema, warmth, callus or dry skin  Left Foot:   Skin Integrity: Negative for ulcer, skin breakdown, erythema, warmth, callus or dry skin  Lymphadenopathy:     He has no cervical adenopathy  Neurological: He is alert and oriented to person, place, and time  He has normal reflexes  No cranial nerve deficit  Skin: Skin is warm  No rash noted  No erythema  No pallor  Psychiatric: He has a normal mood and affect  His behavior is normal  Judgment and thought content normal    Nursing note and vitals reviewed  Patient's shoes and socks removed  Right Foot/Ankle   Right Foot Inspection  Skin Exam: skin normal skin not intact, no dry skin, no warmth, no callus, no erythema, no maceration, no abnormal color, no pre-ulcer, no ulcer and no callus                          Toe Exam: ROM and strength within normal limits  Sensory   Vibration: intact  Proprioception: intact   Monofilament testing: intact  Vascular  Capillary refills: < 3 seconds  The right DP pulse is 2+       Left Foot/Ankle  Left Foot Inspection  Skin Exam: skin normalskin not intact, no dry skin, no warmth, no erythema, no maceration, normal color, no pre-ulcer, no ulcer and no callus                         Toe Exam: ROM and strength within normal limits                   Sensory   Vibration: intact  Proprioception: intact  Monofilament: intact  Vascular  Capillary refills: < 3 seconds  The left DP pulse is 2+  Assign Risk Category:  ; ;        Risk: 0      Past Surgical History:   Procedure Laterality Date    CARPAL TUNNEL RELEASE Bilateral     Last Assessed:9/26/2014    CYSTOSCOPY      HERNIA REPAIR      Femoral Hernia Repair Unilateral    VT COLONOSCOPY FLX DX W/COLLJ SPEC WHEN PFRMD N/A 12/13/2018    Procedure: COLONOSCOPY;  Surgeon: Sara Nur MD;  [facility];   Service: Gastroenterology    VT CYSTO/URETERO W/LITHOTRIPSY &INDWELL STENT INSRT Left 8/11/2016    Procedure: CYSTOSCOPY URETEROSCOPY,RETROGRADE PYELOGRAM AND INSERTION STENT URETERAL;  Surgeon: Huey Barahona MD;  [facility];  Service: Urology    [facility] ESWL Right 11/17/2016    Procedure: ESWL;  Surgeon: Huey Barahona MD;  [facility];  Service: Urology       Family History   Problem Relation Age of Onset    Coronary artery disease Mother     Heart disease Father     Lung cancer Father     Cancer Father     Stomach cancer Brother          Current Outpatient Prescriptions:     allopurinol (ZYLOPRIM) 100 mg tablet, Take 1 tablet (100 mg total) by mouth daily, Disp: 90 tablet, Rfl: 1    cholecalciferol (VITAMIN D3) 1,000 units tablet, Take 1,000 Units by mouth daily, Disp: , Rfl:     CINNAMON PO, Take 1,000 mg by mouth daily, Disp: , Rfl:     glipiZIDE (GLUCOTROL) 5 mg tablet, Take 1 tablet (5 mg total) by mouth daily, Disp: 90 tablet, Rfl: 1    lisinopril (ZESTRIL) 10 mg tablet, Take 1 tablet (10 mg total) by mouth daily, Disp: 90 tablet, Rfl: 1    metFORMIN (GLUCOPHAGE) 500 mg tablet, Take 1 tablet (500 mg total) by mouth 2 (two) times a day with meals for 180 days, Disp: 360 tablet, Rfl: 0    metoprolol tartrate (LOPRESSOR) 25 mg tablet, TAKE 1 TABLET (25 MG TOTAL) BY MOUTH EVERY 12 (TWELVE) HOURS, Disp: 180 tablet, Rfl: 1    Multiple Vitamin (MULTIVITAMIN) tablet, Take 1 tablet by mouth daily, Disp: , Rfl:     simvastatin (ZOCOR) 10 mg tablet, Take 1 tablet (10 mg total) by mouth daily at bedtime, Disp: 90 tablet, Rfl: 0    Allergies   Allergen Reactions    Penicillins        Vitals:    01/14/19 0827   BP: 120/70   Pulse: 68   Resp: 18   SpO2: 98%   Weight: 136 kg (300 lb)   Height: 5' 8" (1 727 m)

## 2019-01-14 NOTE — ASSESSMENT & PLAN NOTE
Lab Results   Component Value Date    HGBA1C 6 6 (H) 12/20/2018     Hemoglobin A1c went up from last time, discussed with about low carb diet and he should increase his physical activity  Recheck labs in 3 months  And follow-up, encouraged to drink plenty of fluids every day  His creatinine is 1 5 which has been stable and he follows urologist as he had passed kidney stone in the past  No results for input(s): POCGLU in the last 72 hours      Blood Sugar Average: Last 72 hrs:

## 2019-01-14 NOTE — ASSESSMENT & PLAN NOTE
His uric acid improved, he would continue drinking more fluids and he should not be eating red meat or lot of few purine

## 2019-01-14 NOTE — ASSESSMENT & PLAN NOTE
He has gained significant weight since last visit, that is why his blood sugar is up, discussed with him to lose weight with less carb and more physical activity

## 2019-02-18 DIAGNOSIS — E78.2 MIXED HYPERLIPIDEMIA: ICD-10-CM

## 2019-02-18 DIAGNOSIS — E78.5 HYPERLIPIDEMIA, UNSPECIFIED HYPERLIPIDEMIA TYPE: ICD-10-CM

## 2019-02-18 RX ORDER — SIMVASTATIN 10 MG
10 TABLET ORAL
Qty: 90 TABLET | Refills: 0 | Status: SHIPPED | OUTPATIENT
Start: 2019-02-18 | End: 2019-05-20 | Stop reason: SDUPTHER

## 2019-03-18 DIAGNOSIS — E08.9 DIABETES MELLITUS DUE TO UNDERLYING CONDITION WITHOUT COMPLICATION, WITHOUT LONG-TERM CURRENT USE OF INSULIN (HCC): ICD-10-CM

## 2019-03-18 RX ORDER — GLIPIZIDE 5 MG/1
5 TABLET ORAL DAILY
Qty: 90 TABLET | Refills: 1 | Status: SHIPPED | OUTPATIENT
Start: 2019-03-18 | End: 2019-11-13 | Stop reason: SDUPTHER

## 2019-04-15 ENCOUNTER — APPOINTMENT (OUTPATIENT)
Dept: LAB | Facility: CLINIC | Age: 64
End: 2019-04-15
Payer: COMMERCIAL

## 2019-04-15 ENCOUNTER — TELEPHONE (OUTPATIENT)
Dept: FAMILY MEDICINE CLINIC | Facility: CLINIC | Age: 64
End: 2019-04-15

## 2019-04-15 DIAGNOSIS — I10 BENIGN ESSENTIAL HYPERTENSION: ICD-10-CM

## 2019-04-15 DIAGNOSIS — N28.9 ACUTE ON CHRONIC RENAL INSUFFICIENCY: Primary | ICD-10-CM

## 2019-04-15 DIAGNOSIS — M1A.9XX0 CHRONIC GOUT WITHOUT TOPHUS, UNSPECIFIED CAUSE, UNSPECIFIED SITE: ICD-10-CM

## 2019-04-15 DIAGNOSIS — N18.9 ACUTE ON CHRONIC RENAL INSUFFICIENCY: Primary | ICD-10-CM

## 2019-04-15 DIAGNOSIS — N18.2 TYPE 2 DIABETES MELLITUS WITH STAGE 2 CHRONIC KIDNEY DISEASE, WITHOUT LONG-TERM CURRENT USE OF INSULIN (HCC): ICD-10-CM

## 2019-04-15 DIAGNOSIS — E11.22 TYPE 2 DIABETES MELLITUS WITH STAGE 2 CHRONIC KIDNEY DISEASE, WITHOUT LONG-TERM CURRENT USE OF INSULIN (HCC): ICD-10-CM

## 2019-04-15 LAB
ALBUMIN SERPL BCP-MCNC: 3.5 G/DL (ref 3.5–5)
ALP SERPL-CCNC: 78 U/L (ref 46–116)
ALT SERPL W P-5'-P-CCNC: 30 U/L (ref 12–78)
ANION GAP SERPL CALCULATED.3IONS-SCNC: 7 MMOL/L (ref 4–13)
AST SERPL W P-5'-P-CCNC: 25 U/L (ref 5–45)
BASOPHILS # BLD AUTO: 0.05 THOUSANDS/ΜL (ref 0–0.1)
BASOPHILS NFR BLD AUTO: 1 % (ref 0–1)
BILIRUB SERPL-MCNC: 0.4 MG/DL (ref 0.2–1)
BUN SERPL-MCNC: 33 MG/DL (ref 5–25)
CALCIUM SERPL-MCNC: 9.3 MG/DL (ref 8.3–10.1)
CHLORIDE SERPL-SCNC: 101 MMOL/L (ref 100–108)
CHOLEST SERPL-MCNC: 100 MG/DL (ref 50–200)
CO2 SERPL-SCNC: 29 MMOL/L (ref 21–32)
CREAT SERPL-MCNC: 1.7 MG/DL (ref 0.6–1.3)
EOSINOPHIL # BLD AUTO: 0.21 THOUSAND/ΜL (ref 0–0.61)
EOSINOPHIL NFR BLD AUTO: 2 % (ref 0–6)
ERYTHROCYTE [DISTWIDTH] IN BLOOD BY AUTOMATED COUNT: 14.5 % (ref 11.6–15.1)
EST. AVERAGE GLUCOSE BLD GHB EST-MCNC: 151 MG/DL
GFR SERPL CREATININE-BSD FRML MDRD: 42 ML/MIN/1.73SQ M
GLUCOSE P FAST SERPL-MCNC: 148 MG/DL (ref 65–99)
HBA1C MFR BLD: 6.9 % (ref 4.2–6.3)
HCT VFR BLD AUTO: 41.6 % (ref 36.5–49.3)
HDLC SERPL-MCNC: 33 MG/DL (ref 40–60)
HGB BLD-MCNC: 12.9 G/DL (ref 12–17)
IMM GRANULOCYTES # BLD AUTO: 0.04 THOUSAND/UL (ref 0–0.2)
IMM GRANULOCYTES NFR BLD AUTO: 0 % (ref 0–2)
LDLC SERPL CALC-MCNC: 41 MG/DL (ref 0–100)
LYMPHOCYTES # BLD AUTO: 2.22 THOUSANDS/ΜL (ref 0.6–4.47)
LYMPHOCYTES NFR BLD AUTO: 23 % (ref 14–44)
MCH RBC QN AUTO: 28.4 PG (ref 26.8–34.3)
MCHC RBC AUTO-ENTMCNC: 31 G/DL (ref 31.4–37.4)
MCV RBC AUTO: 91 FL (ref 82–98)
MONOCYTES # BLD AUTO: 0.72 THOUSAND/ΜL (ref 0.17–1.22)
MONOCYTES NFR BLD AUTO: 7 % (ref 4–12)
NEUTROPHILS # BLD AUTO: 6.57 THOUSANDS/ΜL (ref 1.85–7.62)
NEUTS SEG NFR BLD AUTO: 67 % (ref 43–75)
NONHDLC SERPL-MCNC: 67 MG/DL
NRBC BLD AUTO-RTO: 0 /100 WBCS
PLATELET # BLD AUTO: 208 THOUSANDS/UL (ref 149–390)
PMV BLD AUTO: 11.2 FL (ref 8.9–12.7)
POTASSIUM SERPL-SCNC: 5.3 MMOL/L (ref 3.5–5.3)
PROT SERPL-MCNC: 7.3 G/DL (ref 6.4–8.2)
RBC # BLD AUTO: 4.55 MILLION/UL (ref 3.88–5.62)
SODIUM SERPL-SCNC: 137 MMOL/L (ref 136–145)
TRIGL SERPL-MCNC: 128 MG/DL
TSH SERPL DL<=0.05 MIU/L-ACNC: 2.13 UIU/ML (ref 0.36–3.74)
URATE SERPL-MCNC: 7.9 MG/DL (ref 4.2–8)
WBC # BLD AUTO: 9.81 THOUSAND/UL (ref 4.31–10.16)

## 2019-04-15 PROCEDURE — 80053 COMPREHEN METABOLIC PANEL: CPT

## 2019-04-15 PROCEDURE — 84443 ASSAY THYROID STIM HORMONE: CPT

## 2019-04-15 PROCEDURE — 36415 COLL VENOUS BLD VENIPUNCTURE: CPT

## 2019-04-15 PROCEDURE — 84550 ASSAY OF BLOOD/URIC ACID: CPT

## 2019-04-15 PROCEDURE — 80061 LIPID PANEL: CPT

## 2019-04-15 PROCEDURE — 83036 HEMOGLOBIN GLYCOSYLATED A1C: CPT

## 2019-04-15 PROCEDURE — 85025 COMPLETE CBC W/AUTO DIFF WBC: CPT

## 2019-04-16 ENCOUNTER — TELEPHONE (OUTPATIENT)
Dept: FAMILY MEDICINE CLINIC | Facility: CLINIC | Age: 64
End: 2019-04-16

## 2019-05-01 ENCOUNTER — TRANSCRIBE ORDERS (OUTPATIENT)
Dept: LAB | Facility: CLINIC | Age: 64
End: 2019-05-01

## 2019-05-01 ENCOUNTER — CONSULT (OUTPATIENT)
Dept: NEPHROLOGY | Facility: CLINIC | Age: 64
End: 2019-05-01
Payer: COMMERCIAL

## 2019-05-01 ENCOUNTER — APPOINTMENT (OUTPATIENT)
Dept: LAB | Facility: CLINIC | Age: 64
End: 2019-05-01
Payer: COMMERCIAL

## 2019-05-01 VITALS
HEIGHT: 68 IN | DIASTOLIC BLOOD PRESSURE: 72 MMHG | WEIGHT: 301 LBS | SYSTOLIC BLOOD PRESSURE: 126 MMHG | BODY MASS INDEX: 45.62 KG/M2

## 2019-05-01 DIAGNOSIS — E87.5 HYPERKALEMIA: ICD-10-CM

## 2019-05-01 DIAGNOSIS — I10 BENIGN ESSENTIAL HYPERTENSION: ICD-10-CM

## 2019-05-01 DIAGNOSIS — N20.0 NEPHROLITHIASIS: ICD-10-CM

## 2019-05-01 DIAGNOSIS — N18.30 CKD (CHRONIC KIDNEY DISEASE) STAGE 3, GFR 30-59 ML/MIN (HCC): ICD-10-CM

## 2019-05-01 DIAGNOSIS — N18.2 TYPE 2 DIABETES MELLITUS WITH STAGE 2 CHRONIC KIDNEY DISEASE, WITHOUT LONG-TERM CURRENT USE OF INSULIN (HCC): ICD-10-CM

## 2019-05-01 DIAGNOSIS — E11.22 TYPE 2 DIABETES MELLITUS WITH STAGE 2 CHRONIC KIDNEY DISEASE, WITHOUT LONG-TERM CURRENT USE OF INSULIN (HCC): ICD-10-CM

## 2019-05-01 DIAGNOSIS — E66.01 MORBID OBESITY WITH BODY MASS INDEX (BMI) OF 45.0 TO 49.9 IN ADULT (HCC): Primary | ICD-10-CM

## 2019-05-01 DIAGNOSIS — N28.9 ACUTE ON CHRONIC RENAL INSUFFICIENCY: ICD-10-CM

## 2019-05-01 DIAGNOSIS — N18.9 ACUTE ON CHRONIC RENAL INSUFFICIENCY: ICD-10-CM

## 2019-05-01 LAB
AMORPH URATE CRY URNS QL MICRO: ABNORMAL /HPF
ANION GAP SERPL CALCULATED.3IONS-SCNC: 12 MMOL/L (ref 4–13)
BACTERIA UR QL AUTO: ABNORMAL /HPF
BILIRUB UR QL STRIP: NEGATIVE
BUN SERPL-MCNC: 39 MG/DL (ref 5–25)
CALCIUM SERPL-MCNC: 9.7 MG/DL (ref 8.3–10.1)
CHLORIDE SERPL-SCNC: 104 MMOL/L (ref 100–108)
CLARITY UR: CLEAR
CO2 SERPL-SCNC: 24 MMOL/L (ref 21–32)
COLOR UR: YELLOW
CREAT SERPL-MCNC: 1.69 MG/DL (ref 0.6–1.3)
GFR SERPL CREATININE-BSD FRML MDRD: 42 ML/MIN/1.73SQ M
GLUCOSE SERPL-MCNC: 90 MG/DL (ref 65–140)
GLUCOSE UR STRIP-MCNC: NEGATIVE MG/DL
HGB UR QL STRIP.AUTO: ABNORMAL
KETONES UR STRIP-MCNC: NEGATIVE MG/DL
LEUKOCYTE ESTERASE UR QL STRIP: NEGATIVE
NITRITE UR QL STRIP: NEGATIVE
NON-SQ EPI CELLS URNS QL MICRO: ABNORMAL /HPF
PH UR STRIP.AUTO: 5.5 [PH]
POTASSIUM SERPL-SCNC: 5.1 MMOL/L (ref 3.5–5.3)
PROT UR STRIP-MCNC: NEGATIVE MG/DL
RBC #/AREA URNS AUTO: ABNORMAL /HPF
SODIUM SERPL-SCNC: 140 MMOL/L (ref 136–145)
SP GR UR STRIP.AUTO: 1.01 (ref 1–1.03)
UROBILINOGEN UR QL STRIP.AUTO: 0.2 E.U./DL
WBC #/AREA URNS AUTO: ABNORMAL /HPF

## 2019-05-01 PROCEDURE — 81001 URINALYSIS AUTO W/SCOPE: CPT | Performed by: INTERNAL MEDICINE

## 2019-05-01 PROCEDURE — 99245 OFF/OP CONSLTJ NEW/EST HI 55: CPT | Performed by: INTERNAL MEDICINE

## 2019-05-01 PROCEDURE — 80048 BASIC METABOLIC PNL TOTAL CA: CPT

## 2019-05-01 PROCEDURE — 36415 COLL VENOUS BLD VENIPUNCTURE: CPT

## 2019-05-13 ENCOUNTER — OFFICE VISIT (OUTPATIENT)
Dept: FAMILY MEDICINE CLINIC | Facility: CLINIC | Age: 64
End: 2019-05-13
Payer: COMMERCIAL

## 2019-05-13 VITALS
RESPIRATION RATE: 16 BRPM | WEIGHT: 301 LBS | HEART RATE: 71 BPM | OXYGEN SATURATION: 98 % | SYSTOLIC BLOOD PRESSURE: 118 MMHG | HEIGHT: 68 IN | DIASTOLIC BLOOD PRESSURE: 68 MMHG | BODY MASS INDEX: 45.62 KG/M2

## 2019-05-13 DIAGNOSIS — I10 BENIGN ESSENTIAL HYPERTENSION: Primary | ICD-10-CM

## 2019-05-13 DIAGNOSIS — Z23 NEED FOR VACCINATION FOR PNEUMOCOCCUS: ICD-10-CM

## 2019-05-13 DIAGNOSIS — E11.22 TYPE 2 DIABETES MELLITUS WITH STAGE 3 CHRONIC KIDNEY DISEASE, WITHOUT LONG-TERM CURRENT USE OF INSULIN (HCC): ICD-10-CM

## 2019-05-13 DIAGNOSIS — N18.30 CKD (CHRONIC KIDNEY DISEASE) STAGE 3, GFR 30-59 ML/MIN (HCC): ICD-10-CM

## 2019-05-13 DIAGNOSIS — E66.01 CLASS 3 SEVERE OBESITY WITH SERIOUS COMORBIDITY AND BODY MASS INDEX (BMI) OF 45.0 TO 49.9 IN ADULT, UNSPECIFIED OBESITY TYPE (HCC): ICD-10-CM

## 2019-05-13 DIAGNOSIS — N18.30 TYPE 2 DIABETES MELLITUS WITH STAGE 3 CHRONIC KIDNEY DISEASE, WITHOUT LONG-TERM CURRENT USE OF INSULIN (HCC): ICD-10-CM

## 2019-05-13 DIAGNOSIS — Z11.59 NEED FOR HEPATITIS C SCREENING TEST: ICD-10-CM

## 2019-05-13 PROBLEM — E66.813 CLASS 3 SEVERE OBESITY WITH SERIOUS COMORBIDITY AND BODY MASS INDEX (BMI) OF 45.0 TO 49.9 IN ADULT (HCC): Status: ACTIVE | Noted: 2019-05-13

## 2019-05-13 PROCEDURE — 3008F BODY MASS INDEX DOCD: CPT | Performed by: FAMILY MEDICINE

## 2019-05-13 PROCEDURE — 90732 PPSV23 VACC 2 YRS+ SUBQ/IM: CPT

## 2019-05-13 PROCEDURE — 90471 IMMUNIZATION ADMIN: CPT

## 2019-05-13 PROCEDURE — 99214 OFFICE O/P EST MOD 30 MIN: CPT | Performed by: FAMILY MEDICINE

## 2019-05-20 DIAGNOSIS — E78.2 MIXED HYPERLIPIDEMIA: ICD-10-CM

## 2019-05-20 DIAGNOSIS — I10 ESSENTIAL HYPERTENSION: ICD-10-CM

## 2019-05-20 RX ORDER — SIMVASTATIN 10 MG
10 TABLET ORAL
Qty: 90 TABLET | Refills: 0 | Status: SHIPPED | OUTPATIENT
Start: 2019-05-20 | End: 2019-08-16 | Stop reason: SDUPTHER

## 2019-05-21 ENCOUNTER — HOSPITAL ENCOUNTER (OUTPATIENT)
Dept: RADIOLOGY | Facility: HOSPITAL | Age: 64
Discharge: HOME | End: 2019-05-21
Attending: UROLOGY
Payer: COMMERCIAL

## 2019-05-21 ENCOUNTER — APPOINTMENT (OUTPATIENT)
Dept: LAB | Facility: CLINIC | Age: 64
End: 2019-05-21
Payer: COMMERCIAL

## 2019-05-21 DIAGNOSIS — N20.0 CALCULUS OF KIDNEY: ICD-10-CM

## 2019-05-21 DIAGNOSIS — Z12.5 SCREENING FOR PROSTATE CANCER: ICD-10-CM

## 2019-05-21 LAB — PSA SERPL-MCNC: 1.4 NG/ML (ref 0–4)

## 2019-05-21 PROCEDURE — 36415 COLL VENOUS BLD VENIPUNCTURE: CPT

## 2019-05-21 PROCEDURE — 74018 RADEX ABDOMEN 1 VIEW: CPT

## 2019-05-21 PROCEDURE — G0103 PSA SCREENING: HCPCS

## 2019-05-24 ENCOUNTER — OFFICE VISIT (OUTPATIENT)
Dept: UROLOGY | Facility: AMBULATORY SURGERY CENTER | Age: 64
End: 2019-05-24
Payer: COMMERCIAL

## 2019-05-24 VITALS
WEIGHT: 300 LBS | DIASTOLIC BLOOD PRESSURE: 80 MMHG | HEART RATE: 74 BPM | SYSTOLIC BLOOD PRESSURE: 130 MMHG | BODY MASS INDEX: 45.47 KG/M2 | HEIGHT: 68 IN

## 2019-05-24 DIAGNOSIS — Z12.5 SCREENING FOR PROSTATE CANCER: Primary | ICD-10-CM

## 2019-05-24 DIAGNOSIS — N20.0 NEPHROLITHIASIS: ICD-10-CM

## 2019-05-24 PROCEDURE — 99214 OFFICE O/P EST MOD 30 MIN: CPT | Performed by: NURSE PRACTITIONER

## 2019-07-15 DIAGNOSIS — N20.0 NEPHROLITHIASIS: ICD-10-CM

## 2019-07-15 DIAGNOSIS — I10 BENIGN ESSENTIAL HYPERTENSION: ICD-10-CM

## 2019-07-15 PROCEDURE — 4010F ACE/ARB THERAPY RXD/TAKEN: CPT | Performed by: FAMILY MEDICINE

## 2019-07-15 RX ORDER — LISINOPRIL 10 MG/1
10 TABLET ORAL DAILY
Qty: 90 TABLET | Refills: 1 | Status: SHIPPED | OUTPATIENT
Start: 2019-07-15 | End: 2020-01-13 | Stop reason: SDUPTHER

## 2019-07-15 RX ORDER — ALLOPURINOL 100 MG/1
100 TABLET ORAL DAILY
Qty: 90 TABLET | Refills: 1 | Status: SHIPPED | OUTPATIENT
Start: 2019-07-15 | End: 2020-01-13 | Stop reason: SDUPTHER

## 2019-07-16 ENCOUNTER — CONSULT (OUTPATIENT)
Dept: BARIATRICS | Facility: CLINIC | Age: 64
End: 2019-07-16
Payer: COMMERCIAL

## 2019-07-16 VITALS
BODY MASS INDEX: 44.86 KG/M2 | HEIGHT: 68 IN | HEART RATE: 70 BPM | DIASTOLIC BLOOD PRESSURE: 72 MMHG | RESPIRATION RATE: 16 BRPM | WEIGHT: 296 LBS | TEMPERATURE: 98.4 F | SYSTOLIC BLOOD PRESSURE: 130 MMHG

## 2019-07-16 DIAGNOSIS — E66.01 CLASS 3 SEVERE OBESITY WITH SERIOUS COMORBIDITY AND BODY MASS INDEX (BMI) OF 45.0 TO 49.9 IN ADULT, UNSPECIFIED OBESITY TYPE (HCC): Primary | ICD-10-CM

## 2019-07-16 DIAGNOSIS — I10 BENIGN ESSENTIAL HYPERTENSION: ICD-10-CM

## 2019-07-16 DIAGNOSIS — E11.22 TYPE 2 DIABETES MELLITUS WITH STAGE 3 CHRONIC KIDNEY DISEASE, WITHOUT LONG-TERM CURRENT USE OF INSULIN (HCC): ICD-10-CM

## 2019-07-16 DIAGNOSIS — N18.30 CKD (CHRONIC KIDNEY DISEASE) STAGE 3, GFR 30-59 ML/MIN (HCC): ICD-10-CM

## 2019-07-16 DIAGNOSIS — E78.2 MIXED HYPERLIPIDEMIA: ICD-10-CM

## 2019-07-16 DIAGNOSIS — N18.30 TYPE 2 DIABETES MELLITUS WITH STAGE 3 CHRONIC KIDNEY DISEASE, WITHOUT LONG-TERM CURRENT USE OF INSULIN (HCC): ICD-10-CM

## 2019-07-16 DIAGNOSIS — E66.01 MORBID OBESITY WITH BODY MASS INDEX (BMI) OF 45.0 TO 49.9 IN ADULT (HCC): ICD-10-CM

## 2019-07-16 DIAGNOSIS — R29.818 SUSPECTED SLEEP APNEA: ICD-10-CM

## 2019-07-16 PROCEDURE — 99243 OFF/OP CNSLTJ NEW/EST LOW 30: CPT | Performed by: FAMILY MEDICINE

## 2019-07-16 NOTE — PROGRESS NOTES
Assessment/Plan:      Diagnoses and all orders for this visit:    Class 3 severe obesity with serious comorbidity and body mass index (BMI) of 45 0 to 49 9 in adult, unspecified obesity type ([facility] [address] )  -     Ambulatory referral to Weight Management  -     Ambulatory referral to Sleep Medicine; Future    Morbid obesity with body mass index (BMI) of 45 0 to 49 9 in adult [facility])    CKD (chronic kidney disease) stage 3, GFR 30-59 ml/min ([facility])    Type 2 diabetes mellitus with stage 3 chronic kidney disease, without long-term current use of insulin ([facility])    Benign essential hypertension    Mixed hyperlipidemia    Suspected sleep apnea  -     Ambulatory referral to Sleep Medicine; Future      -Discussed options of Healthy[facility]-Intensive Lifestyle Intervention Program, Very Low Calorie Diet-VLCD, Conservative Program, Sukh-En-Y Gastric Bypass and Vertical Sleeve Gastrectomy and the role of weight loss medications   -Initial weight loss goal of 5-10% weight loss for improved health  -Screening labs- last done 4/2019  -Patient is interested in pursuing Conservative Program    Goals:  Food log (ie ) www myfitnesspal com,sparkpeople  com,loseit com,calorieking  com,etc  baritastic  No sugary beverages  At least 64oz of water daily  Increase physical activity by 10 minutes daily  Gradually increase physical activity to a goal of 5 days per week for 30 minutes of MODERATE intensity PLUS 2 days per week of FULL BODY resistance training  Goal protein intake of 60-80 grams per day, 5-10 servings of fruits and vegetables per day and 8525-4699 calories per day    45 minute visit, >50% face-to-face time spent counseling patient on surgical and nonsurgical interventions for the treatment of excess weight  Discussed the advantages and long-term outcomes with regards to bariatric surgery, not interested as he worries he wont be able to eat and too restricted for him    Discussed in detail nonsurgical options including intensive lifestyle intervention program, very low-calorie diet program and conservative program   Discussed the role of weight loss medications  Counseled patient on diet behavior and exercise modification for weight loss  Follow up in approximately 2 months with Non-Surgical Physician/Advanced Practitioner  Subjective:   Chief Complaint   Patient presents with    Consult     mwm consult        Patient ID: Rishi Souza  is a 61 y o  male with excess weight/obesity here to pursue weight management  Past Medical History:   Diagnosis Date    Diabetes mellitus ([facility] [address] )     Gout     Hypertension     Leukocytosis     Onset:2012    Onychomycosis     Onse:2012    Other muscle spasm     Renal calculi     Shoulder joint pain        HPI:  Obesity/Excess Weight:  Severity: class 3  Onset:  Since a child    Modifiers: Diet and Exercise  Contributing factors: Poor Food Choices, Insufficient Physical Activity and Lack of knowledge of appropriate lifestyle changes  Associated symptoms: comorbid conditions    Goals: 200lbs  Hydration:  Alcohol:   Exercise: no  Sleep: intermintent  STOP ban/8    Lives alone  Retired    Food recall-   BF yogurt/2 toasts  L skips or 2 sandwiches  D- 3 peaches    The following portions of the patient's history were reviewed and updated as appropriate: allergies, current medications, past family history, past medical history, past social history, past surgical history and problem list     Review of Systems   Constitutional: Negative for activity change and appetite change  Respiratory: Negative  Cardiovascular: Negative  Gastrointestinal: Negative  Genitourinary: Negative  Musculoskeletal: Negative for arthralgias  Skin: Negative for rash  Psychiatric/Behavioral: Negative          Objective:    /72 (BP Location: Left arm, Patient Position: Sitting, Cuff Size: Adult)   Pulse 70   Temp 98 4 °F (36 9 °C) (Tympanic)   Resp 16   Ht 5' 8" (1 727 m)   Wt 134 kg (296 lb)   BMI 45 01 kg/m²     Physical Exam    Constitutional   General appearance: Abnormal   well developed and morbidly obese  Eyes No conjunctival pallor  Ears, Nose, Mouth, and Throat Oral mucosa moist    Pulmonary   Respiratory effort: No increased work of breathing or signs of respiratory distress  Auscultation of lungs: Clear to auscultation, equal breath sounds bilaterally, no wheezes, no rales, no rhonci  Cardiovascular   Auscultation of heart: Normal rate and rhythm, normal S1 and S2, without murmurs  Examination of extremities for edema and/or varicosities: Normal   no edema  Abdomen   Abdomen: Abnormal   The abdomen was obese  Bowel sounds were normal  The abdomen was soft and nontender     Musculoskeletal   Gait and station: Normal     Psychiatric   Orientation to person, place and time: Normal     Affect: appropriate

## 2019-08-13 ENCOUNTER — TRANSCRIBE ORDERS (OUTPATIENT)
Dept: LAB | Facility: CLINIC | Age: 64
End: 2019-08-13

## 2019-08-13 ENCOUNTER — APPOINTMENT (OUTPATIENT)
Dept: LAB | Facility: CLINIC | Age: 64
End: 2019-08-13
Payer: COMMERCIAL

## 2019-08-13 DIAGNOSIS — Z11.59 NEED FOR HEPATITIS C SCREENING TEST: ICD-10-CM

## 2019-08-13 DIAGNOSIS — N18.30 TYPE 2 DIABETES MELLITUS WITH STAGE 3 CHRONIC KIDNEY DISEASE, WITHOUT LONG-TERM CURRENT USE OF INSULIN (HCC): ICD-10-CM

## 2019-08-13 DIAGNOSIS — E11.22 TYPE 2 DIABETES MELLITUS WITH STAGE 3 CHRONIC KIDNEY DISEASE, WITHOUT LONG-TERM CURRENT USE OF INSULIN (HCC): ICD-10-CM

## 2019-08-13 LAB
ALBUMIN SERPL BCP-MCNC: 3.2 G/DL (ref 3.5–5)
ALP SERPL-CCNC: 79 U/L (ref 46–116)
ALT SERPL W P-5'-P-CCNC: 26 U/L (ref 12–78)
ANION GAP SERPL CALCULATED.3IONS-SCNC: 9 MMOL/L (ref 4–13)
AST SERPL W P-5'-P-CCNC: 16 U/L (ref 5–45)
BASOPHILS # BLD AUTO: 0.03 THOUSANDS/ΜL (ref 0–0.1)
BASOPHILS NFR BLD AUTO: 0 % (ref 0–1)
BILIRUB SERPL-MCNC: 0.4 MG/DL (ref 0.2–1)
BUN SERPL-MCNC: 18 MG/DL (ref 5–25)
CALCIUM SERPL-MCNC: 8.3 MG/DL (ref 8.3–10.1)
CHLORIDE SERPL-SCNC: 106 MMOL/L (ref 100–108)
CHOLEST SERPL-MCNC: 102 MG/DL (ref 50–200)
CO2 SERPL-SCNC: 23 MMOL/L (ref 21–32)
CREAT SERPL-MCNC: 1.46 MG/DL (ref 0.6–1.3)
EOSINOPHIL # BLD AUTO: 0.2 THOUSAND/ΜL (ref 0–0.61)
EOSINOPHIL NFR BLD AUTO: 2 % (ref 0–6)
ERYTHROCYTE [DISTWIDTH] IN BLOOD BY AUTOMATED COUNT: 14.6 % (ref 11.6–15.1)
EST. AVERAGE GLUCOSE BLD GHB EST-MCNC: 143 MG/DL
GFR SERPL CREATININE-BSD FRML MDRD: 50 ML/MIN/1.73SQ M
GLUCOSE P FAST SERPL-MCNC: 125 MG/DL (ref 65–99)
HBA1C MFR BLD: 6.6 % (ref 4.2–6.3)
HCT VFR BLD AUTO: 39.9 % (ref 36.5–49.3)
HCV AB SER QL: NORMAL
HDLC SERPL-MCNC: 30 MG/DL (ref 40–60)
HGB BLD-MCNC: 12.5 G/DL (ref 12–17)
IMM GRANULOCYTES # BLD AUTO: 0.04 THOUSAND/UL (ref 0–0.2)
IMM GRANULOCYTES NFR BLD AUTO: 1 % (ref 0–2)
LDLC SERPL CALC-MCNC: 37 MG/DL (ref 0–100)
LYMPHOCYTES # BLD AUTO: 1.82 THOUSANDS/ΜL (ref 0.6–4.47)
LYMPHOCYTES NFR BLD AUTO: 22 % (ref 14–44)
MCH RBC QN AUTO: 28.8 PG (ref 26.8–34.3)
MCHC RBC AUTO-ENTMCNC: 31.3 G/DL (ref 31.4–37.4)
MCV RBC AUTO: 92 FL (ref 82–98)
MONOCYTES # BLD AUTO: 0.64 THOUSAND/ΜL (ref 0.17–1.22)
MONOCYTES NFR BLD AUTO: 8 % (ref 4–12)
NEUTROPHILS # BLD AUTO: 5.63 THOUSANDS/ΜL (ref 1.85–7.62)
NEUTS SEG NFR BLD AUTO: 67 % (ref 43–75)
NONHDLC SERPL-MCNC: 72 MG/DL
NRBC BLD AUTO-RTO: 0 /100 WBCS
PLATELET # BLD AUTO: 182 THOUSANDS/UL (ref 149–390)
PMV BLD AUTO: 11.2 FL (ref 8.9–12.7)
POTASSIUM SERPL-SCNC: 5 MMOL/L (ref 3.5–5.3)
PROT SERPL-MCNC: 6.7 G/DL (ref 6.4–8.2)
RBC # BLD AUTO: 4.34 MILLION/UL (ref 3.88–5.62)
SODIUM SERPL-SCNC: 138 MMOL/L (ref 136–145)
TRIGL SERPL-MCNC: 176 MG/DL
WBC # BLD AUTO: 8.36 THOUSAND/UL (ref 4.31–10.16)

## 2019-08-13 PROCEDURE — 80053 COMPREHEN METABOLIC PANEL: CPT

## 2019-08-13 PROCEDURE — 36415 COLL VENOUS BLD VENIPUNCTURE: CPT

## 2019-08-13 PROCEDURE — 83036 HEMOGLOBIN GLYCOSYLATED A1C: CPT

## 2019-08-13 PROCEDURE — 85025 COMPLETE CBC W/AUTO DIFF WBC: CPT

## 2019-08-13 PROCEDURE — 80061 LIPID PANEL: CPT

## 2019-08-13 PROCEDURE — 86803 HEPATITIS C AB TEST: CPT

## 2019-08-16 DIAGNOSIS — E78.5 HYPERLIPIDEMIA, UNSPECIFIED HYPERLIPIDEMIA TYPE: ICD-10-CM

## 2019-08-16 DIAGNOSIS — E78.2 MIXED HYPERLIPIDEMIA: ICD-10-CM

## 2019-08-19 RX ORDER — SIMVASTATIN 10 MG
10 TABLET ORAL
Qty: 90 TABLET | Refills: 0 | Status: SHIPPED | OUTPATIENT
Start: 2019-08-19 | End: 2019-12-03

## 2019-08-20 ENCOUNTER — OFFICE VISIT (OUTPATIENT)
Dept: FAMILY MEDICINE CLINIC | Facility: CLINIC | Age: 64
End: 2019-08-20
Payer: COMMERCIAL

## 2019-08-20 VITALS
HEART RATE: 71 BPM | BODY MASS INDEX: 44.28 KG/M2 | HEIGHT: 68 IN | SYSTOLIC BLOOD PRESSURE: 130 MMHG | RESPIRATION RATE: 18 BRPM | WEIGHT: 292.2 LBS | OXYGEN SATURATION: 98 % | DIASTOLIC BLOOD PRESSURE: 80 MMHG

## 2019-08-20 DIAGNOSIS — M1A.9XX0 CHRONIC GOUT WITHOUT TOPHUS, UNSPECIFIED CAUSE, UNSPECIFIED SITE: ICD-10-CM

## 2019-08-20 DIAGNOSIS — E11.22 TYPE 2 DIABETES MELLITUS WITH STAGE 3 CHRONIC KIDNEY DISEASE, WITHOUT LONG-TERM CURRENT USE OF INSULIN (HCC): ICD-10-CM

## 2019-08-20 DIAGNOSIS — I10 BENIGN ESSENTIAL HYPERTENSION: Primary | ICD-10-CM

## 2019-08-20 DIAGNOSIS — N18.30 TYPE 2 DIABETES MELLITUS WITH STAGE 3 CHRONIC KIDNEY DISEASE, WITHOUT LONG-TERM CURRENT USE OF INSULIN (HCC): ICD-10-CM

## 2019-08-20 PROBLEM — Z23 NEED FOR INFLUENZA VACCINATION: Status: RESOLVED | Noted: 2018-09-20 | Resolved: 2019-08-20

## 2019-08-20 PROCEDURE — 3008F BODY MASS INDEX DOCD: CPT | Performed by: FAMILY MEDICINE

## 2019-08-20 PROCEDURE — 1036F TOBACCO NON-USER: CPT | Performed by: FAMILY MEDICINE

## 2019-08-20 PROCEDURE — 99214 OFFICE O/P EST MOD 30 MIN: CPT | Performed by: FAMILY MEDICINE

## 2019-08-20 NOTE — PROGRESS NOTES
Assessment/Plan:    Problem List Items Addressed This Visit        Endocrine    Type 2 diabetes mellitus with stage 3 chronic kidney disease, without long-term current use of insulin ([facility] [address] )     Lab Results   Component Value Date    HGBA1C 6 6 (H) 08/13/2019    Diabetes stable A1c 6 6, which is better  Continue working on diet and losing weight, encouraged to drink more water  Lab Results   Component Value Date    CREATININE 1 46 (H) 08/13/2019    Creatinine has improved, he follows nephrologist now, not changed medications      No results for input(s): POCGLU in the last 72 hours  Blood Sugar Average: Last 72 hrs:           Relevant Orders    CBC and differential    Comprehensive metabolic panel    Hemoglobin A1C    Lipid panel    TSH, 3rd generation       Cardiovascular and Mediastinum    Benign essential hypertension - Primary     Hypertension is stable, continue same            Other    Gout     No recent gout attack, he will continue allopurinol and low purine diet         Relevant Orders    Uric acid          Chief Complaint   Patient presents with    Follow-up     3month and labs       Subjective:   Patient ID: Sami Landers is a 61 y o  male  He is here for follow-up, he says he join the bariatric team and he is working on losing some weight, denies any headache chest pain shortness of breath or leg swelling  He has been seeing urologist and nephrologist no recent changes in his health  Taking all his medication as prescribed,   no recent gout attack  He is diabetic taking his metformin, no hypoglycemia       Review of Systems   Constitutional: Negative for activity change, appetite change, chills, diaphoresis, fatigue, fever and unexpected weight change     HENT: Negative for congestion, dental problem, ear discharge, ear pain, facial swelling, hearing loss, mouth sores, nosebleeds, postnasal drip, rhinorrhea, sinus pressure, sinus pain, sneezing, sore throat, trouble swallowing and voice change  Eyes: Negative for photophobia, pain, discharge, redness and itching  Respiratory: Negative for cough, chest tightness, shortness of breath and wheezing  Cardiovascular: Negative for chest pain, palpitations and leg swelling  Gastrointestinal: Negative for abdominal distention, abdominal pain, blood in stool, constipation, diarrhea and nausea  Endocrine: Negative for cold intolerance, heat intolerance, polydipsia, polyphagia and polyuria  Genitourinary: Negative for dysuria, flank pain, frequency, hematuria and urgency  Musculoskeletal: Negative for arthralgias, back pain, myalgias and neck pain  Skin: Negative for color change and pallor  Allergic/Immunologic: Negative for environmental allergies and food allergies  Neurological: Negative for dizziness, weakness, light-headedness, numbness and headaches  Hematological: Negative for adenopathy  Does not bruise/bleed easily  Psychiatric/Behavioral: Negative for behavioral problems, sleep disturbance and suicidal ideas  The patient is not nervous/anxious  Objective:  Physical Exam   Constitutional: He is oriented to person, place, and time  He appears well-developed and well-nourished  HENT:   Head: Normocephalic and atraumatic  Nose: Nose normal    Mouth/Throat: Oropharynx is clear and moist  No oropharyngeal exudate  Eyes: EOM are normal  Right eye exhibits no discharge  Left eye exhibits no discharge  No scleral icterus  Neck: Normal range of motion  Neck supple  No tracheal deviation present  No thyromegaly present  Cardiovascular: Normal rate, regular rhythm and normal heart sounds  No murmur heard  Pulmonary/Chest: Effort normal and breath sounds normal  No respiratory distress  He has no wheezes  He has no rales  Abdominal: Soft  Bowel sounds are normal  He exhibits no distension and no mass  There is no tenderness  There is no rebound  Musculoskeletal: Normal range of motion  He exhibits no edema  Lymphadenopathy:     He has no cervical adenopathy  Neurological: He is alert and oriented to person, place, and time  He has normal reflexes  No cranial nerve deficit  Skin: Skin is warm  No rash noted  No erythema  No pallor  Psychiatric: He has a normal mood and affect  His behavior is normal  Judgment and thought content normal    Nursing note and vitals reviewed  BMI Counseling: Body mass index is 44 43 kg/m²  Discussed the patient's BMI with him  The BMI is above average  BMI counseling and education was provided to the patient  Nutrition recommendations include reducing portion sizes, decreasing overall calorie intake, 3-5 servings of fruits/vegetables daily, reducing fast food intake, consuming healthier snacks, decreasing soda and/or juice intake, moderation in carbohydrate intake, increasing intake of lean protein, reducing intake of saturated fat and trans fat and reducing intake of cholesterol  Exercise recommendations include moderate aerobic physical activity for 150 minutes/week  Past Surgical History:   Procedure Laterality Date    CARPAL TUNNEL RELEASE Bilateral     Last Assessed:9/26/2014    CYSTOSCOPY      HERNIA REPAIR      Femoral Hernia Repair Unilateral    IA COLONOSCOPY FLX DX W/COLLJ SPEC WHEN PFRMD N/A 12/13/2018    Procedure: COLONOSCOPY;  Surgeon: Shira Piña MD;  [facility];   Service: Gastroenterology    IA CYSTO/URETERO W/LITHOTRIPSY &INDWELL STENT INSRT Left 8/11/2016    Procedure: CYSTOSCOPY URETEROSCOPY,RETROGRADE PYELOGRAM AND INSERTION STENT URETERAL;  Surgeon: Maritza Castro MD;  [facility];  Service: Urology    IA FRAGMENT KIDNEY STONE/ ESWL Right 11/17/2016    Procedure: ESWL;  Surgeon: Maritza Castro MD;  [facility];  Service: Urology       Family History   Problem Relation Age of Onset    Coronary artery disease Mother     Heart disease Father     Lung cancer Father     Cancer Father     Stomach cancer Brother Current Outpatient Medications:     allopurinol (ZYLOPRIM) 100 mg tablet, Take 1 tablet (100 mg total) by mouth daily, Disp: 90 tablet, Rfl: 1    cholecalciferol (VITAMIN D3) 1,000 units tablet, Take 1,000 Units by mouth daily, Disp: , Rfl:     CINNAMON PO, Take 1,000 mg by mouth daily, Disp: , Rfl:     glipiZIDE (GLUCOTROL) 5 mg tablet, Take 1 tablet (5 mg total) by mouth daily, Disp: 90 tablet, Rfl: 1    lisinopril (ZESTRIL) 10 mg tablet, Take 1 tablet (10 mg total) by mouth daily, Disp: 90 tablet, Rfl: 1    metFORMIN (GLUCOPHAGE) 500 mg tablet, Take 1 tablet (500 mg total) by mouth 2 (two) times a day with meals for 180 days, Disp: 360 tablet, Rfl: 0    metoprolol tartrate (LOPRESSOR) 25 mg tablet, Take 1 tablet (25 mg total) by mouth every 12 (twelve) hours, Disp: 180 tablet, Rfl: 1    simvastatin (ZOCOR) 10 mg tablet, Take 1 tablet (10 mg total) by mouth daily at bedtime, Disp: 90 tablet, Rfl: 0    Allergies   Allergen Reactions    Penicillins      Unknown as child       Vitals:    08/20/19 0809   BP: 130/80   Pulse: 71   Resp: 18   SpO2: 98%   Weight: 133 kg (292 lb 3 2 oz)   Height: 5' 8" (1 727 m)

## 2019-08-20 NOTE — ASSESSMENT & PLAN NOTE
Lab Results   Component Value Date    HGBA1C 6 6 (H) 08/13/2019    Diabetes stable A1c 6 6, which is better  Continue working on diet and losing weight, encouraged to drink more water  Lab Results   Component Value Date    CREATININE 1 46 (H) 08/13/2019    Creatinine has improved, he follows nephrologist now, not changed medications      No results for input(s): POCGLU in the last 72 hours      Blood Sugar Average: Last 72 hrs:

## 2019-09-03 LAB
LEFT EYE DIABETIC RETINOPATHY: NORMAL
RIGHT EYE DIABETIC RETINOPATHY: NORMAL

## 2019-09-05 LAB — RIGHT EYE DIABETIC RETINOPATHY: NORMAL

## 2019-09-16 ENCOUNTER — OFFICE VISIT (OUTPATIENT)
Dept: BARIATRICS | Facility: CLINIC | Age: 64
End: 2019-09-16
Payer: COMMERCIAL

## 2019-09-16 VITALS
HEIGHT: 68 IN | TEMPERATURE: 98.1 F | HEART RATE: 60 BPM | DIASTOLIC BLOOD PRESSURE: 70 MMHG | WEIGHT: 289.4 LBS | BODY MASS INDEX: 43.86 KG/M2 | RESPIRATION RATE: 18 BRPM | SYSTOLIC BLOOD PRESSURE: 130 MMHG

## 2019-09-16 DIAGNOSIS — I10 BENIGN ESSENTIAL HYPERTENSION: ICD-10-CM

## 2019-09-16 DIAGNOSIS — E66.01 CLASS 3 SEVERE OBESITY WITH SERIOUS COMORBIDITY AND BODY MASS INDEX (BMI) OF 45.0 TO 49.9 IN ADULT, UNSPECIFIED OBESITY TYPE (HCC): Primary | ICD-10-CM

## 2019-09-16 DIAGNOSIS — N18.30 TYPE 2 DIABETES MELLITUS WITH STAGE 3 CHRONIC KIDNEY DISEASE, WITHOUT LONG-TERM CURRENT USE OF INSULIN (HCC): ICD-10-CM

## 2019-09-16 DIAGNOSIS — E66.01 MORBID OBESITY WITH BODY MASS INDEX (BMI) OF 45.0 TO 49.9 IN ADULT (HCC): ICD-10-CM

## 2019-09-16 DIAGNOSIS — N18.30 CKD (CHRONIC KIDNEY DISEASE) STAGE 3, GFR 30-59 ML/MIN (HCC): ICD-10-CM

## 2019-09-16 DIAGNOSIS — N20.0 NEPHROLITHIASIS: ICD-10-CM

## 2019-09-16 DIAGNOSIS — E11.22 TYPE 2 DIABETES MELLITUS WITH STAGE 3 CHRONIC KIDNEY DISEASE, WITHOUT LONG-TERM CURRENT USE OF INSULIN (HCC): ICD-10-CM

## 2019-09-16 DIAGNOSIS — M1A.9XX0 CHRONIC GOUT WITHOUT TOPHUS, UNSPECIFIED CAUSE, UNSPECIFIED SITE: ICD-10-CM

## 2019-09-16 DIAGNOSIS — E78.2 MIXED HYPERLIPIDEMIA: ICD-10-CM

## 2019-09-16 PROCEDURE — 99214 OFFICE O/P EST MOD 30 MIN: CPT | Performed by: FAMILY MEDICINE

## 2019-09-16 PROCEDURE — 3075F SYST BP GE 130 - 139MM HG: CPT | Performed by: FAMILY MEDICINE

## 2019-09-16 PROCEDURE — 3078F DIAST BP <80 MM HG: CPT | Performed by: FAMILY MEDICINE

## 2019-09-16 RX ORDER — POLYMYXIN B SULFATE AND TRIMETHOPRIM 1; 10000 MG/ML; [USP'U]/ML
SOLUTION OPHTHALMIC
Refills: 2 | COMMUNITY
Start: 2019-09-04 | End: 2019-12-03

## 2019-09-16 NOTE — PROGRESS NOTES
Assessment/Plan:    No problem-specific Assessment & Plan notes found for this encounter  Diagnoses and all orders for this visit:    Class 3 severe obesity with serious comorbidity and body mass index (BMI) of 45 0 to 49 9 in adult, unspecified obesity type ([facility] [address] )    Type 2 diabetes mellitus with stage 3 chronic kidney disease, without long-term current use of insulin (HCC)    Benign essential hypertension    CKD (chronic kidney disease) stage 3, GFR 30-59 ml/min ([facility])    Morbid obesity with body mass index (BMI) of 45 0 to 49 9 in adult [facility])    Mixed hyperlipidemia    Chronic gout without tophus, unspecified cause, unspecified site    Nephrolithiasis    Other orders  -     polymyxin b-trimethoprim (POLYTRIM) ophthalmic solution; INSTILL 1 DROP IN RIGHT EYE FOUR TIMES DAILY FOR SEVEN DAYS        -Patient is pursuing Conservative Program  -Initial weight loss goal of 5-10% weight loss for improved health  -Screening labs- last done 4/2019  - I would recommend weaning off the glipizide (wt positive med) as his A1c was 6 6% last time  - As he continues to loss weight and Blood pressure is expected to lower will monitor and decide to wean off of metoprolol first      Initial: 296lbs  Current: 289 4lbs  Change: -6 6lbs  Goal: 200lbs     Goals:  Food log (ie ) www myfitnesspal com,sparkpeople  com,loseit com,calorieking  com,etc  baritastic  No sugary beverages  At least 64oz of water daily  Increase physical activity by 10 minutes daily  Gradually increase physical activity to a goal of 5 days per week for 30 minutes of MODERATE intensity PLUS 2 days per week of FULL BODY resistance training  Goal protein intake of 60-80 grams per day, 5-10 servings of fruits and vegetables per day and 4990-1639 calories per day    Follow up in approximately 2 months with Non-Surgical Physician/Advanced Practitioner  Subjective:   Chief Complaint   Patient presents with    Follow-up     Patient is here for MWM follow-up  Patient ID: Vincenza Lefort  is a 61 y o  male with excess weight/obesity here to pursue weight management  Patient is pursuing Conservative Program      HPI     Pt presents for 2 month follow up visit  He has lost -6 6lbs since last visit  Food log reviewed  Hydration- water: 1/2 to 1 gallon a day; coffee occasionally, soda 1-2 times a week     Last A1c 8/13 was 6 9--> 6 6%    The following portions of the patient's history were reviewed and updated as appropriate: allergies, current medications, past family history, past medical history, past social history, past surgical history and problem list     Review of Systems   Constitutional: Negative for activity change and appetite change  Respiratory: Negative  Cardiovascular: Negative  Gastrointestinal: Negative  Genitourinary: Negative  Musculoskeletal: Negative for arthralgias  Skin: Negative for rash  Psychiatric/Behavioral: Negative  Objective:    /70 (BP Location: Left arm, Patient Position: Sitting, Cuff Size: Large)   Pulse 60   Temp 98 1 °F (36 7 °C) (Tympanic)   Resp 18   Ht 5' 8" (1 727 m)   Wt 131 kg (289 lb 6 4 oz)   BMI 44 00 kg/m²      Physical Exam     Constitutional   General appearance: Abnormal   well developed and morbidly obese  Eyes No conjunctival pallor  Ears, Nose, Mouth, and Throat Oral mucosa moist    Pulmonary   Respiratory effort: No increased work of breathing or signs of respiratory distress  Auscultation of lungs: Clear to auscultation, equal breath sounds bilaterally, no wheezes, no rales, no rhonci  Cardiovascular   Auscultation of heart: Normal rate and rhythm, normal S1 and S2, without murmurs  Examination of extremities for edema and/or varicosities: Normal   no edema  Abdomen   Abdomen: Abnormal   The abdomen was obese  Bowel sounds were normal  The abdomen was soft and nontender     Musculoskeletal   Gait and station: Normal     Psychiatric   Orientation to person, place and time: Normal     Affect: appropriate

## 2019-09-23 ENCOUNTER — TELEPHONE (OUTPATIENT)
Dept: NEPHROLOGY | Facility: CLINIC | Age: 64
End: 2019-09-23

## 2019-09-23 NOTE — TELEPHONE ENCOUNTER
I left a message for patient to schedule November follow up with Dr Magui Live in the [facility]

## 2019-10-10 LAB
LEFT EYE DIABETIC RETINOPATHY: NORMAL
RIGHT EYE DIABETIC RETINOPATHY: NORMAL

## 2019-11-01 ENCOUNTER — APPOINTMENT (OUTPATIENT)
Dept: LAB | Facility: CLINIC | Age: 64
End: 2019-11-01
Payer: COMMERCIAL

## 2019-11-01 DIAGNOSIS — M1A.9XX0 CHRONIC GOUT WITHOUT TOPHUS, UNSPECIFIED CAUSE, UNSPECIFIED SITE: ICD-10-CM

## 2019-11-01 DIAGNOSIS — N18.30 CKD (CHRONIC KIDNEY DISEASE) STAGE 3, GFR 30-59 ML/MIN (HCC): ICD-10-CM

## 2019-11-01 LAB
25(OH)D3 SERPL-MCNC: 34 NG/ML (ref 30–100)
ALBUMIN SERPL BCP-MCNC: 3.5 G/DL (ref 3.5–5)
ALP SERPL-CCNC: 95 U/L (ref 46–116)
ALT SERPL W P-5'-P-CCNC: 24 U/L (ref 12–78)
ANION GAP SERPL CALCULATED.3IONS-SCNC: 8 MMOL/L (ref 4–13)
AST SERPL W P-5'-P-CCNC: 17 U/L (ref 5–45)
BILIRUB SERPL-MCNC: 0.4 MG/DL (ref 0.2–1)
BUN SERPL-MCNC: 31 MG/DL (ref 5–25)
CALCIUM SERPL-MCNC: 9.3 MG/DL (ref 8.3–10.1)
CHLORIDE SERPL-SCNC: 101 MMOL/L (ref 100–108)
CO2 SERPL-SCNC: 26 MMOL/L (ref 21–32)
CREAT SERPL-MCNC: 1.45 MG/DL (ref 0.6–1.3)
CREAT UR-MCNC: 106 MG/DL
ERYTHROCYTE [DISTWIDTH] IN BLOOD BY AUTOMATED COUNT: 14.3 % (ref 11.6–15.1)
GFR SERPL CREATININE-BSD FRML MDRD: 51 ML/MIN/1.73SQ M
GLUCOSE P FAST SERPL-MCNC: 159 MG/DL (ref 65–99)
HCT VFR BLD AUTO: 43.6 % (ref 36.5–49.3)
HGB BLD-MCNC: 13.6 G/DL (ref 12–17)
MCH RBC QN AUTO: 28.9 PG (ref 26.8–34.3)
MCHC RBC AUTO-ENTMCNC: 31.2 G/DL (ref 31.4–37.4)
MCV RBC AUTO: 93 FL (ref 82–98)
PLATELET # BLD AUTO: 179 THOUSANDS/UL (ref 149–390)
PMV BLD AUTO: 11.2 FL (ref 8.9–12.7)
POTASSIUM SERPL-SCNC: 4.8 MMOL/L (ref 3.5–5.3)
PROT SERPL-MCNC: 7.3 G/DL (ref 6.4–8.2)
PROT UR-MCNC: 12 MG/DL
PROT/CREAT UR: 0.11 MG/G{CREAT} (ref 0–0.1)
PTH-INTACT SERPL-MCNC: 40.1 PG/ML (ref 18.4–80.1)
RBC # BLD AUTO: 4.7 MILLION/UL (ref 3.88–5.62)
SODIUM SERPL-SCNC: 135 MMOL/L (ref 136–145)
URATE SERPL-MCNC: 7.5 MG/DL (ref 4.2–8)
WBC # BLD AUTO: 10.51 THOUSAND/UL (ref 4.31–10.16)

## 2019-11-01 PROCEDURE — 36415 COLL VENOUS BLD VENIPUNCTURE: CPT

## 2019-11-01 PROCEDURE — 84550 ASSAY OF BLOOD/URIC ACID: CPT

## 2019-11-01 PROCEDURE — 82570 ASSAY OF URINE CREATININE: CPT

## 2019-11-01 PROCEDURE — 82306 VITAMIN D 25 HYDROXY: CPT

## 2019-11-01 PROCEDURE — 84156 ASSAY OF PROTEIN URINE: CPT

## 2019-11-01 PROCEDURE — 80053 COMPREHEN METABOLIC PANEL: CPT

## 2019-11-01 PROCEDURE — 85027 COMPLETE CBC AUTOMATED: CPT

## 2019-11-01 PROCEDURE — 83970 ASSAY OF PARATHORMONE: CPT

## 2019-11-08 DIAGNOSIS — I10 ESSENTIAL HYPERTENSION: ICD-10-CM

## 2019-11-13 DIAGNOSIS — E08.9 DIABETES MELLITUS DUE TO UNDERLYING CONDITION WITHOUT COMPLICATION, WITHOUT LONG-TERM CURRENT USE OF INSULIN (HCC): ICD-10-CM

## 2019-11-13 RX ORDER — GLIPIZIDE 5 MG/1
5 TABLET ORAL DAILY
Qty: 30 TABLET | Refills: 0 | Status: SHIPPED | OUTPATIENT
Start: 2019-11-13 | End: 2019-11-18

## 2019-11-13 RX ORDER — GLIPIZIDE 5 MG/1
TABLET ORAL
Qty: 90 TABLET | Refills: 1 | OUTPATIENT
Start: 2019-11-13

## 2019-11-13 NOTE — TELEPHONE ENCOUNTER
Patient last seen in August and has his follow up scheduled for December 3rd  30 day supply sent into [facility] pharmacy today  Patient will have enough to get him to that appointment  Please refuse the 90 day refill

## 2019-11-14 ENCOUNTER — TRANSCRIBE ORDERS (OUTPATIENT)
Dept: LAB | Facility: CLINIC | Age: 64
End: 2019-11-14

## 2019-11-14 ENCOUNTER — APPOINTMENT (OUTPATIENT)
Dept: LAB | Facility: CLINIC | Age: 64
End: 2019-11-14
Payer: COMMERCIAL

## 2019-11-14 DIAGNOSIS — N18.30 TYPE 2 DIABETES MELLITUS WITH STAGE 3 CHRONIC KIDNEY DISEASE, WITHOUT LONG-TERM CURRENT USE OF INSULIN (HCC): ICD-10-CM

## 2019-11-14 DIAGNOSIS — E11.22 TYPE 2 DIABETES MELLITUS WITH STAGE 3 CHRONIC KIDNEY DISEASE, WITHOUT LONG-TERM CURRENT USE OF INSULIN (HCC): ICD-10-CM

## 2019-11-14 LAB
ALBUMIN SERPL BCP-MCNC: 3.4 G/DL (ref 3.5–5)
ALP SERPL-CCNC: 99 U/L (ref 46–116)
ALT SERPL W P-5'-P-CCNC: 20 U/L (ref 12–78)
ANION GAP SERPL CALCULATED.3IONS-SCNC: 10 MMOL/L (ref 4–13)
AST SERPL W P-5'-P-CCNC: 15 U/L (ref 5–45)
BASOPHILS # BLD AUTO: 0.06 THOUSANDS/ΜL (ref 0–0.1)
BASOPHILS NFR BLD AUTO: 1 % (ref 0–1)
BILIRUB SERPL-MCNC: 0.4 MG/DL (ref 0.2–1)
BUN SERPL-MCNC: 29 MG/DL (ref 5–25)
CALCIUM SERPL-MCNC: 9.1 MG/DL (ref 8.3–10.1)
CHLORIDE SERPL-SCNC: 101 MMOL/L (ref 100–108)
CHOLEST SERPL-MCNC: 115 MG/DL (ref 50–200)
CO2 SERPL-SCNC: 27 MMOL/L (ref 21–32)
CREAT SERPL-MCNC: 1.38 MG/DL (ref 0.6–1.3)
EOSINOPHIL # BLD AUTO: 0.38 THOUSAND/ΜL (ref 0–0.61)
EOSINOPHIL NFR BLD AUTO: 4 % (ref 0–6)
ERYTHROCYTE [DISTWIDTH] IN BLOOD BY AUTOMATED COUNT: 14 % (ref 11.6–15.1)
EST. AVERAGE GLUCOSE BLD GHB EST-MCNC: 151 MG/DL
GFR SERPL CREATININE-BSD FRML MDRD: 54 ML/MIN/1.73SQ M
GLUCOSE SERPL-MCNC: 170 MG/DL (ref 65–140)
HBA1C MFR BLD: 6.9 % (ref 4.2–6.3)
HCT VFR BLD AUTO: 41.5 % (ref 36.5–49.3)
HDLC SERPL-MCNC: 33 MG/DL
HGB BLD-MCNC: 13.1 G/DL (ref 12–17)
IMM GRANULOCYTES # BLD AUTO: 0.03 THOUSAND/UL (ref 0–0.2)
IMM GRANULOCYTES NFR BLD AUTO: 0 % (ref 0–2)
LDLC SERPL CALC-MCNC: 47 MG/DL (ref 0–100)
LYMPHOCYTES # BLD AUTO: 2.04 THOUSANDS/ΜL (ref 0.6–4.47)
LYMPHOCYTES NFR BLD AUTO: 21 % (ref 14–44)
MCH RBC QN AUTO: 28.9 PG (ref 26.8–34.3)
MCHC RBC AUTO-ENTMCNC: 31.6 G/DL (ref 31.4–37.4)
MCV RBC AUTO: 92 FL (ref 82–98)
MONOCYTES # BLD AUTO: 0.65 THOUSAND/ΜL (ref 0.17–1.22)
MONOCYTES NFR BLD AUTO: 7 % (ref 4–12)
NEUTROPHILS # BLD AUTO: 6.38 THOUSANDS/ΜL (ref 1.85–7.62)
NEUTS SEG NFR BLD AUTO: 67 % (ref 43–75)
NONHDLC SERPL-MCNC: 82 MG/DL
NRBC BLD AUTO-RTO: 0 /100 WBCS
PLATELET # BLD AUTO: 192 THOUSANDS/UL (ref 149–390)
PMV BLD AUTO: 11.3 FL (ref 8.9–12.7)
POTASSIUM SERPL-SCNC: 4.7 MMOL/L (ref 3.5–5.3)
PROT SERPL-MCNC: 7.3 G/DL (ref 6.4–8.2)
RBC # BLD AUTO: 4.53 MILLION/UL (ref 3.88–5.62)
SODIUM SERPL-SCNC: 138 MMOL/L (ref 136–145)
TRIGL SERPL-MCNC: 175 MG/DL
TSH SERPL DL<=0.05 MIU/L-ACNC: 3.17 UIU/ML (ref 0.36–3.74)
WBC # BLD AUTO: 9.54 THOUSAND/UL (ref 4.31–10.16)

## 2019-11-14 PROCEDURE — 36415 COLL VENOUS BLD VENIPUNCTURE: CPT

## 2019-11-14 PROCEDURE — 80061 LIPID PANEL: CPT

## 2019-11-14 PROCEDURE — 84443 ASSAY THYROID STIM HORMONE: CPT

## 2019-11-14 PROCEDURE — 85025 COMPLETE CBC W/AUTO DIFF WBC: CPT

## 2019-11-14 PROCEDURE — 80053 COMPREHEN METABOLIC PANEL: CPT

## 2019-11-14 PROCEDURE — 83036 HEMOGLOBIN GLYCOSYLATED A1C: CPT

## 2019-11-18 ENCOUNTER — OFFICE VISIT (OUTPATIENT)
Dept: BARIATRICS | Facility: CLINIC | Age: 64
End: 2019-11-18
Payer: COMMERCIAL

## 2019-11-18 VITALS
BODY MASS INDEX: 42.92 KG/M2 | WEIGHT: 283.2 LBS | DIASTOLIC BLOOD PRESSURE: 60 MMHG | RESPIRATION RATE: 16 BRPM | SYSTOLIC BLOOD PRESSURE: 120 MMHG | HEART RATE: 63 BPM | TEMPERATURE: 98.2 F | HEIGHT: 68 IN

## 2019-11-18 DIAGNOSIS — N18.30 TYPE 2 DIABETES MELLITUS WITH STAGE 3 CHRONIC KIDNEY DISEASE, WITHOUT LONG-TERM CURRENT USE OF INSULIN (HCC): Primary | ICD-10-CM

## 2019-11-18 DIAGNOSIS — I10 BENIGN ESSENTIAL HYPERTENSION: ICD-10-CM

## 2019-11-18 DIAGNOSIS — E11.22 TYPE 2 DIABETES MELLITUS WITH STAGE 3 CHRONIC KIDNEY DISEASE, WITHOUT LONG-TERM CURRENT USE OF INSULIN (HCC): Primary | ICD-10-CM

## 2019-11-18 DIAGNOSIS — E66.01 CLASS 3 SEVERE OBESITY WITH SERIOUS COMORBIDITY AND BODY MASS INDEX (BMI) OF 45.0 TO 49.9 IN ADULT, UNSPECIFIED OBESITY TYPE (HCC): ICD-10-CM

## 2019-11-18 DIAGNOSIS — N18.30 CKD (CHRONIC KIDNEY DISEASE) STAGE 3, GFR 30-59 ML/MIN (HCC): ICD-10-CM

## 2019-11-18 PROCEDURE — 3066F NEPHROPATHY DOC TX: CPT | Performed by: FAMILY MEDICINE

## 2019-11-18 PROCEDURE — 99214 OFFICE O/P EST MOD 30 MIN: CPT | Performed by: FAMILY MEDICINE

## 2019-11-18 NOTE — PATIENT INSTRUCTIONS
2181-8041 calories per day  30-40 grams carbs for meals, 15-20gram carbs for snacks  Take metoprolol once a day and contact me in 1-2 weeks with blood pressure log, goal 120/80

## 2019-11-18 NOTE — PROGRESS NOTES
Assessment/Plan:    No problem-specific Assessment & Plan notes found for this encounter  Diagnoses and all orders for this visit:    Type 2 diabetes mellitus with stage 3 chronic kidney disease, without long-term current use of insulin ([facility] [address] )  -     Ambulatory referral to medical nutrition therapy for diabetes; Future    Benign essential hypertension  -     Ambulatory referral to medical nutrition therapy for diabetes; Future    CKD (chronic kidney disease) stage 3, GFR 30-59 ml/min ([facility])  -     Ambulatory referral to medical nutrition therapy for diabetes; Future    Class 3 severe obesity with serious comorbidity and body mass index (BMI) of 45 0 to 49 9 in adult, unspecified obesity type [facility])  -     Ambulatory referral to medical nutrition therapy for diabetes; Future        -Patient is pursuing Conservative Program  -Initial weight loss goal of 5-10% weight loss for improved health  -Screening labs- reviewed done 11/2019  Start weanning of metoprolol, monitor BP and contact me in 1-2 weeks, if stable 120/80 can DC metoprolol  Already DC glipizide by his PCP 3 weeks ago  He did notice some increase in fastign glucose but A1c 6 9   - we discussed carbs goal  He is willing to see a nutritionist for DM education, referral done  Initial: 296lbs   Current: 283 2lbs  Change: -12 8lbs  Goal: 200lbs    Goals:  Food log (ie ) www myfitnesspal com,sparkpeople  com,loseit com,calorieking  com,etc  baritastic  No sugary beverages  At least 64oz of water daily  Increase physical activity by 10 minutes daily  Gradually increase physical activity to a goal of 5 days per week for 30 minutes of MODERATE intensity PLUS 2 days per week of FULL BODY resistance training  5366-9282 calories per day  30-40 grams carbs for meals, 15-20gram carbs for snacks  Take metoprolol once a day and contact me in 1-2 weeks with blood pressure log, goal 120/80      Follow up in approximately 6 weeks with Non-Surgical Physician/Advanced Practitioner  Subjective:   Chief Complaint   Patient presents with    Follow-up     2 month mw follow up        Patient ID: Kentrell Pandey  is a 59 y o  male with excess weight/obesity here to pursue weight management  Patient is pursuing Conservative Program      HPI     Pt presents for 2 month follow up  Lost total -12 8lbs  Doing well, compliant with login on paper  Not carb counting  Unable to exercise as he just recently had some right eye issues with retinal detachment and partially blind affecting his ambulation  Food log reviewed, high carb content  The following portions of the patient's history were reviewed and updated as appropriate: allergies, current medications, past family history, past medical history, past social history, past surgical history and problem list     Review of Systems   Constitutional: Negative for activity change and appetite change  HENT: Negative  Eyes: Positive for visual disturbance (followign with ophtha)  Respiratory: Negative  Cardiovascular: Negative  Gastrointestinal: Negative  Genitourinary: Negative  Musculoskeletal: Negative for arthralgias  Skin: Negative for rash  Neurological: Negative  Psychiatric/Behavioral: Negative  Objective:    /60 (BP Location: Left arm, Patient Position: Sitting, Cuff Size: Adult)   Pulse 63   Temp 98 2 °F (36 8 °C) (Tympanic)   Resp 16   Ht 5' 8" (1 727 m)   Wt 128 kg (283 lb 3 2 oz)   BMI 43 06 kg/m²      Physical Exam     Constitutional   General appearance: Abnormal   well developed and morbidly obese  Eyes No conjunctival pallor  Ears, Nose, Mouth, and Throat Oral mucosa moist    Pulmonary   Respiratory effort: No increased work of breathing or signs of respiratory distress  Auscultation of lungs: Clear to auscultation, equal breath sounds bilaterally, no wheezes, no rales, no rhonci      Cardiovascular   Auscultation of heart: Normal rate and rhythm, normal S1 and S2, without murmurs  Examination of extremities for edema and/or varicosities: Normal   no edema  Abdomen   Abdomen: Abnormal   The abdomen was obese  Bowel sounds were normal  The abdomen was soft and nontender     Musculoskeletal   Gait and station: Normal     Psychiatric   Orientation to person, place and time: Normal     Affect: appropriate

## 2019-11-27 ENCOUNTER — OFFICE VISIT (OUTPATIENT)
Dept: NEPHROLOGY | Facility: CLINIC | Age: 64
End: 2019-11-27
Payer: COMMERCIAL

## 2019-11-27 VITALS
WEIGHT: 279.4 LBS | BODY MASS INDEX: 42.34 KG/M2 | SYSTOLIC BLOOD PRESSURE: 134 MMHG | DIASTOLIC BLOOD PRESSURE: 70 MMHG | HEIGHT: 68 IN

## 2019-11-27 DIAGNOSIS — E11.22 TYPE 2 DIABETES MELLITUS WITH STAGE 3 CHRONIC KIDNEY DISEASE, WITHOUT LONG-TERM CURRENT USE OF INSULIN (HCC): ICD-10-CM

## 2019-11-27 DIAGNOSIS — N18.30 CKD (CHRONIC KIDNEY DISEASE) STAGE 3, GFR 30-59 ML/MIN (HCC): ICD-10-CM

## 2019-11-27 DIAGNOSIS — I10 BENIGN ESSENTIAL HYPERTENSION: ICD-10-CM

## 2019-11-27 DIAGNOSIS — N18.30 TYPE 2 DIABETES MELLITUS WITH STAGE 3 CHRONIC KIDNEY DISEASE, WITHOUT LONG-TERM CURRENT USE OF INSULIN (HCC): ICD-10-CM

## 2019-11-27 DIAGNOSIS — N20.0 NEPHROLITHIASIS: Primary | ICD-10-CM

## 2019-11-27 PROCEDURE — 99214 OFFICE O/P EST MOD 30 MIN: CPT | Performed by: INTERNAL MEDICINE

## 2019-12-03 ENCOUNTER — OFFICE VISIT (OUTPATIENT)
Dept: FAMILY MEDICINE CLINIC | Facility: CLINIC | Age: 64
End: 2019-12-03
Payer: COMMERCIAL

## 2019-12-03 VITALS
HEART RATE: 80 BPM | WEIGHT: 280 LBS | BODY MASS INDEX: 42.44 KG/M2 | SYSTOLIC BLOOD PRESSURE: 140 MMHG | HEIGHT: 68 IN | DIASTOLIC BLOOD PRESSURE: 74 MMHG | OXYGEN SATURATION: 98 % | RESPIRATION RATE: 16 BRPM

## 2019-12-03 DIAGNOSIS — E11.22 TYPE 2 DIABETES MELLITUS WITH STAGE 3 CHRONIC KIDNEY DISEASE, WITHOUT LONG-TERM CURRENT USE OF INSULIN (HCC): Primary | ICD-10-CM

## 2019-12-03 DIAGNOSIS — H26.9 CATARACT OF RIGHT EYE, UNSPECIFIED CATARACT TYPE: ICD-10-CM

## 2019-12-03 DIAGNOSIS — N18.30 TYPE 2 DIABETES MELLITUS WITH STAGE 3 CHRONIC KIDNEY DISEASE, WITHOUT LONG-TERM CURRENT USE OF INSULIN (HCC): Primary | ICD-10-CM

## 2019-12-03 DIAGNOSIS — G57.91 NEUROPATHY OF RIGHT FOOT: ICD-10-CM

## 2019-12-03 DIAGNOSIS — I10 BENIGN ESSENTIAL HYPERTENSION: ICD-10-CM

## 2019-12-03 DIAGNOSIS — Z23 NEED FOR INFLUENZA VACCINATION: ICD-10-CM

## 2019-12-03 PROCEDURE — 90682 RIV4 VACC RECOMBINANT DNA IM: CPT | Performed by: FAMILY MEDICINE

## 2019-12-03 PROCEDURE — 90471 IMMUNIZATION ADMIN: CPT | Performed by: FAMILY MEDICINE

## 2019-12-03 PROCEDURE — 3008F BODY MASS INDEX DOCD: CPT | Performed by: FAMILY MEDICINE

## 2019-12-03 PROCEDURE — 99214 OFFICE O/P EST MOD 30 MIN: CPT | Performed by: FAMILY MEDICINE

## 2019-12-03 NOTE — ASSESSMENT & PLAN NOTE
Blood pressure most of the time is in 951-713 systolic advised to take metoprolol  12 5 mg twice a day, and he will continue lisinopril, and he follows nephrologist

## 2019-12-03 NOTE — ASSESSMENT & PLAN NOTE
Decreased sensation in the right foot on the anterior and medial part of the sole, advised to see podiatrist regularly

## 2019-12-03 NOTE — ASSESSMENT & PLAN NOTE
Lab Results   Component Value Date    HGBA1C 6 9 (H) 11/14/2019    Glipizide was stopped as he is working with his weight loss program and his blood sugar will improve, he will continue metformin, he is scheduled for the dietitian

## 2019-12-03 NOTE — PROGRESS NOTES
Assessment/Plan:    Problem List Items Addressed This Visit        Endocrine    Type 2 diabetes mellitus with stage 3 chronic kidney disease, without long-term current use of insulin ([facility] [address] ) - Primary       Lab Results   Component Value Date    HGBA1C 6 9 (H) 11/14/2019    Glipizide was stopped as he is working with his weight loss program and his blood sugar will improve, he will continue metformin, he is scheduled for the dietitian         Relevant Orders    Diabetic foot exam    Hemoglobin A1C    Lipid panel  Creatinine is improving, he will continue working on weight loss       Cardiovascular and Mediastinum    Benign essential hypertension     Blood pressure most of the time is in 413-816 systolic advised to take metoprolol  12 5 mg twice a day, and he will continue lisinopril, and he follows nephrologist         Relevant Medications    metoprolol tartrate (LOPRESSOR) 25 mg tablet  Advised to take 12 5 mg twice a day metoprolol       Other    Need for influenza vaccination    Relevant Orders    influenza vaccine, [id number], quadrivalent, recombinant, PF, 0 5 mL, for patients 18 yr+ (FLUBLOK) (Completed)    Cataract of right eye      He will come back for cataract preop clearance    Chief Complaint   Patient presents with    Follow-up       Subjective:   Patient ID: Sunil Colorado is a 59 y o  male  He is diabetic, kidney disease and he is following not nephrologist, his creatinine has been improving, he is not taking metoprolol 25 mg daily, and he is taking lisinopril, for diabetes he is on metformin and glipizide was stopped as he is following with bariatric team and planning to lose more weight and is planning to see dietitian, and he is determined to lose weight  He is advised not to have any NSAIDs for his kidney disease    He is not on any statins or medicine for hyperlipidemia and his LDL is at the goal   He says he has retinal detachment in the right at which was treated and now he has developed a cataract in the right eye and he is planning to get surgery for cataract in January and he will come back for preoperative clearance        Review of Systems   Constitutional: Negative for activity change, appetite change, chills, diaphoresis, fatigue, fever and unexpected weight change  HENT: Negative for congestion, dental problem, ear discharge, ear pain, facial swelling, hearing loss, mouth sores, nosebleeds, postnasal drip, rhinorrhea, sinus pressure, sinus pain, sneezing, sore throat, trouble swallowing and voice change  Eyes: Positive for visual disturbance  Negative for photophobia, pain, discharge, redness and itching  He has cataract in the right eye and he will go for surgery next month   Respiratory: Negative for cough, chest tightness, shortness of breath and wheezing  Cardiovascular: Negative for chest pain, palpitations and leg swelling  Gastrointestinal: Negative for abdominal distention, abdominal pain, blood in stool, constipation, diarrhea and nausea  Endocrine: Negative for cold intolerance, heat intolerance, polydipsia, polyphagia and polyuria  Genitourinary: Negative for dysuria, flank pain, frequency, hematuria and urgency  Musculoskeletal: Negative for arthralgias, back pain, myalgias and neck pain  Skin: Negative for color change and pallor  Allergic/Immunologic: Negative for environmental allergies and food allergies  Neurological: Negative for dizziness, weakness, light-headedness, numbness and headaches  Hematological: Negative for adenopathy  Does not bruise/bleed easily  Psychiatric/Behavioral: Negative for behavioral problems, sleep disturbance and suicidal ideas  The patient is not nervous/anxious  Objective:  Physical Exam   Constitutional: He is oriented to person, place, and time  He appears well-developed and well-nourished  HENT:   Head: Normocephalic and atraumatic  Mouth/Throat: No oropharyngeal exudate     Eyes: Conjunctivae and EOM are normal  Right eye exhibits no discharge  Left eye exhibits no discharge  No scleral icterus  Neck: Normal range of motion  Neck supple  No tracheal deviation present  No thyromegaly present  Cardiovascular: Normal rate, regular rhythm and normal heart sounds  No murmur heard  Pulses:       Dorsalis pedis pulses are 2+ on the right side, and 2+ on the left side  Pulmonary/Chest: Effort normal and breath sounds normal  No respiratory distress  He has no wheezes  He has no rales  Abdominal: Soft  Bowel sounds are normal  He exhibits no distension and no mass  There is no tenderness  There is no rebound  Musculoskeletal: Normal range of motion  He exhibits no edema  Feet:   Right Foot:   Skin Integrity: Positive for callus  Negative for ulcer, skin breakdown, erythema, warmth or dry skin  Left Foot:   Skin Integrity: Negative for ulcer, skin breakdown, erythema, warmth, callus or dry skin  Lymphadenopathy:     He has no cervical adenopathy  Neurological: He is alert and oriented to person, place, and time  He has normal reflexes  No cranial nerve deficit  Skin: Skin is warm  No rash noted  No erythema  No pallor  Psychiatric: He has a normal mood and affect  His behavior is normal  Judgment and thought content normal    Nursing note and vitals reviewed  Patient's shoes and socks removed  Right Foot/Ankle   Right Foot Inspection  Skin Exam: skin normal, callus and callus skin not intact, no dry skin, no warmth, no erythema, no maceration, no abnormal color, no pre-ulcer and no ulcer                          Toe Exam: ROM and strength within normal limits  Sensory   Vibration: intact  Proprioception: intact   Monofilament testing: diminished  Vascular  Capillary refills: < 3 seconds  The right DP pulse is 2+       Left Foot/Ankle  Left Foot Inspection  Skin Exam: skin normalskin not intact, no dry skin, no warmth, no erythema, no maceration, normal color, no pre-ulcer, no ulcer and no callus                         Toe Exam: ROM and strength within normal limits                   Sensory   Vibration: intact  Proprioception: intact  Monofilament: intact  Vascular  Capillary refills: < 3 seconds  The left DP pulse is 2+  Assign Risk Category:  ; ;        Risk: 1    Follows podiatrist   Past Surgical History:   Procedure Laterality Date    CARPAL TUNNEL RELEASE Bilateral     Last Assessed:9/26/2014    CYSTOSCOPY      EYE SURGERY  09/04/2019    Detached retina    HERNIA REPAIR      Femoral Hernia Repair Unilateral    HI COLONOSCOPY FLX DX W/COLLJ SPEC WHEN PFRMD N/A 12/13/2018    Procedure: COLONOSCOPY;  Surgeon: Moreno Renee MD;  [facility]: [facility];   Service: Gastroenterology    HI CYSTO/URETERO W/LITHOTRIPSY &INDWELL STENT INSRT Left 8/11/2016    Procedure: CYSTOSCOPY URETEROSCOPY,RETROGRADE PYELOGRAM AND INSERTION STENT URETERAL;  Surgeon: Freddy Farmer MD;  [facility];  Service: Urology    HI FRAGMENT KIDNEY STONE/ ESWL Right 11/17/2016    Procedure: ESWL;  Surgeon: Freddy Farmer MD;  [facility];  Service: Urology       Family History   Problem Relation Age of Onset    Coronary artery disease Mother     Heart disease Father     Lung cancer Father     Cancer Father     Stomach cancer Brother          Current Outpatient Medications:     allopurinol (ZYLOPRIM) 100 mg tablet, Take 1 tablet (100 mg total) by mouth daily, Disp: 90 tablet, Rfl: 1    CINNAMON PO, Take 1,000 mg by mouth daily, Disp: , Rfl:     lisinopril (ZESTRIL) 10 mg tablet, Take 1 tablet (10 mg total) by mouth daily, Disp: 90 tablet, Rfl: 1    metFORMIN (GLUCOPHAGE) 500 mg tablet, Take 1 tablet (500 mg total) by mouth 2 (two) times a day with meals for 180 days, Disp: 360 tablet, Rfl: 0    metoprolol tartrate (LOPRESSOR) 25 mg tablet, Take 25 mg by mouth, Disp: , Rfl:     Allergies   Allergen Reactions    Penicillins      Unknown as child       Vitals:    12/03/19 0809   BP: 140/74 Pulse: 80   Resp: 16   SpO2: 98%   Weight: 127 kg (280 lb)   Height: 5' 8" (1 727 m)

## 2019-12-05 LAB
LEFT EYE DIABETIC RETINOPATHY: NORMAL
RIGHT EYE DIABETIC RETINOPATHY: NORMAL

## 2019-12-05 PROCEDURE — 2023F DILAT RTA XM W/O RTNOPTHY: CPT | Performed by: FAMILY MEDICINE

## 2019-12-06 ENCOUNTER — OFFICE VISIT (OUTPATIENT)
Dept: DIABETES SERVICES | Facility: CLINIC | Age: 64
End: 2019-12-06
Payer: COMMERCIAL

## 2019-12-06 VITALS — WEIGHT: 280.2 LBS | BODY MASS INDEX: 42.6 KG/M2

## 2019-12-06 DIAGNOSIS — N18.30 TYPE 2 DIABETES MELLITUS WITH STAGE 3 CHRONIC KIDNEY DISEASE, WITHOUT LONG-TERM CURRENT USE OF INSULIN (HCC): Primary | ICD-10-CM

## 2019-12-06 DIAGNOSIS — E11.22 TYPE 2 DIABETES MELLITUS WITH STAGE 3 CHRONIC KIDNEY DISEASE, WITHOUT LONG-TERM CURRENT USE OF INSULIN (HCC): Primary | ICD-10-CM

## 2019-12-06 PROCEDURE — 97802 MEDICAL NUTRITION INDIV IN: CPT | Performed by: DIETITIAN, REGISTERED

## 2019-12-06 NOTE — PROGRESS NOTES
Medical Nutrition Therapy        Assessment    Visit Type: Initial visit    Chief complaint T2DM    HPI: Conrad Jaquez is a 59year old male with a history of type 2 diabetes, HTN, and stage 3 CKD  Most recent HbA1c 6 9%  Yudith Weber has been working with bariatrics and has lost approximately 16 lbs over the last 4 5 months  When asked abut his interest in [address] stated that it is not that he wants to but that he has been told that he has to  Yudith Weber states that he has reservations about making changes mostly due to health care professionals telling him what to do and hearing conflicting information about nutrition so often  When asked what making changes looks like for him and what that means to him he talked about his love of going hunting and being able to get around by preserving or improving his eyesight  Sreedhars diet history reveals inconsistent carbohydrate intake, meal skipping, and inadequate intake of fruit, vegetables, whole grains, and low-fat dairy  Currently meals range from 0 to 180 grams of carbohydrate  Sreedhar's breakfast usually consists of a 9 g carb yogurt  He skips lunch 5/7 days per week and will then usually eat an early dinner between 2:30  - 5 pm  His dinner meal typically contains excess carbohydrates from large portions of foods such as a whole sleeve saltines with soup or 3-4 cups of pasta or chili  However, there are some dinner examples in his diet recall that contain only protein and non-starchy vegetables  Explained basic pathophysiology of diabetes and impact of diet on blood glucose levels  Together we discussed what foods contain CHO, reading a food label, timing of meals, serving sizes, and the role of consistent carbohydrate intake in managing blood sugar  Used the portion booklet to teach Yudith Weber more about food groups and basic carbohydrate counting   Created an individualized meal plan for Yudith Weber with 3 meals and 1 snacks providing 60 g carb per meal and 15 g carb per snack  This plan will help promote weight loss  Put together sample meals for Sreedhar's reference  Jl Tinsley misses eating bread and has viewed bread as well as fruit as "bad" foods  Discussed that whole wheat/whole grain bread and fruit can have a place in his diet and provided examples of balanced meals with high nutrient density  Jl Tinsley was able to move from the contemplative stage to preparation stage of change during this visit and presents as having an increased level of motivation at the conclusion of this appointment  Jl Tinsley demonstrated good understanding and will call with any questions prior to follow-up appointment in 6 weeks  Ht Readings from Last 1 Encounters:   12/03/19 5' 8" (1 727 m)     Wt Readings from Last 2 Encounters:   12/06/19 127 kg (280 lb 3 2 oz)   12/03/19 127 kg (280 lb)     Weight Change: No    Medical Diagnosis/ICD10: E11 22, N18 3 (ICD-10-CM) - Type 2 diabetes mellitus with stage 3 chronic kidney disease, without long-term current use of insulin     Barriers to Learning: no barriers    Do you follow any special diet presently?: No  Who shops: patient  Who cooks: patient    Food Log: Completed via the method of food recall    Breakfast:wakes 2-3 am, actually gets up around 7 am  Small yogurt (80 calories, 12 protein, 9 carbs)  Morning Snack:n/a  Lunch:12 pm  Can of soup with crackers (progresso tomato)  And one whole sleeve of saltines  Water  OR around 2-3 might have chicken (A couple pieces), usually nothing with it but might have broccoli or cauliflower on occasion  OR rarely might make spaghetti (1/4 of a box) 3- 4 cups sometimes fried hamburger or sausage in the sauce (ling sauce)  OR chicken noodle soup wiht rice in it or lentils, carrots and celery and onions (3-4 cups) OR chili 3-4 cups  Afternoon Snack: n/a  Dinner:2:30 - 5 pm  A piece of meatloaf that he made a week before, then 30 min later had an apple and a handful of walnuts  Lemon flavored tea bags  Evening Snack:Does not eat after 5 pm    Beverages: lemon tea, water  Eating out/Take out:never  Exercise was walking pretty regularly until September, had a retinal detachment, had operation to get fixed and aggravated cataracts  Supposed to go for suregry at the end of January, "my depth perception is crap  Calorie needs 1600 kcals/day Carbs: 60 g/meal, 15 g/snack         Nutrition Diagnosis:  Inconsistent carbohydrate intake  intake related to Food and nutrition related knowledge deficit concerning appropriate amount and timing of carbohydrate intake as evidenced by  Estimated carbohydrate intake that is different from recommended types or ingested on an irregular basis    Intervention: plate method, reduced fat intake, increased fiber intake, label reading, carbohydrate counting, increased plant based foods, meal timing, meal planning, individualized meal plan and food diary     Treatment Goals: Patient understands education and recommendations, Patient will monitor food intake daily with tracker, Patient will consume 3 meals a day, Patient will monitor portion control, Patient will consume 25-35 grams of fiber a day, Patient will increase their intake of plant based foods and Patient will count carbohydrates    Monitoring and evaluation:    Term code indicator  FH 1 6 3 Carbohydrate Intake Criteria: 60 grams of carbohydrate per meal, 15 grams of carbohydrate per snack  Term code indicator  FH 4 4 Mealtime Behavior Criteria: 3 meals per day, 5 hours apart  1 snack per day, at least 2 hours after dinner  Patients Response to Instruction:  Denise Raza  Expected Compliancegood    Thank you for coming to the [facility] for education today  Please feel free to call with any questions or concerns      Start- Stop: 8:01 am - 9:19 am  Total Minutes: 78 Minutes  Group or Individual Instruction: MNT - I   Other: MD Shelli Bach, [address] [place] [address] [place] [id number]

## 2019-12-06 NOTE — PATIENT INSTRUCTIONS
60 grams of carbohydrate per meal  3 meals per day, 5 hours apart  1 snack per day, at least 2 hours after dinner  15 grams of carbohydrate per meal     Please complete 3 day food record prior to flow-up visit and bring with you to that appointment

## 2019-12-17 DIAGNOSIS — E78.2 MIXED HYPERLIPIDEMIA: ICD-10-CM

## 2019-12-17 RX ORDER — SIMVASTATIN 10 MG
TABLET ORAL
Qty: 90 TABLET | Refills: 1 | Status: SHIPPED | OUTPATIENT
Start: 2019-12-17 | End: 2020-04-01 | Stop reason: ALTCHOICE

## 2019-12-27 NOTE — PROGRESS NOTES
Assessment/Plan:    No problem-specific Assessment & Plan notes found for this encounter  Diagnoses and all orders for this visit:    Class 3 severe obesity with serious comorbidity and body mass index (BMI) of 45 0 to 49 9 in adult, unspecified obesity type (HCC)    Benign essential hypertension    Type 2 diabetes mellitus with stage 3 chronic kidney disease, without long-term current use of insulin (HCC)    CKD (chronic kidney disease) stage 3, GFR 30-59 ml/min (HCC)    Mixed hyperlipidemia    Hyperlipidemia, unspecified hyperlipidemia type  -     metFORMIN (GLUCOPHAGE) 850 mg tablet; Take 1 tablet (850 mg total) by mouth 2 (two) times a day with meals        -Patient is pursuing Conservative Program  -Initial weight loss goal of 5-10% weight loss for improved health  -Screening labs- reviewed done 11/2019  - Off of metoprolol, stable BP  Off of glipizide, last A1c in 11/2019 was 6 9  - He will adjust carb goal to 40-50gr with meal and 15gram w snacks  - will increase metformin       Initial: 296lbs   Current: 280 9lbs  Change: -15 1lbs (-2 4lbs)  Goal: 200lbs     Goals:  Food log (ie ) www myfitnesspal com,sparkpeople  com,loseit com,calorieking  com,etc  baritastic  No sugary beverages  At least 64oz of water daily  Increase physical activity by 10 minutes daily  Gradually increase physical activity to a goal of 5 days per week for 30 minutes of MODERATE intensity PLUS 2 days per week of FULL BODY resistance training  4455-1181 calories per day  40-50 grams carbs for meals, 15-20gram carbs for snacks      Follow up in approximately 2 months with Non-Surgical Physician/Advanced Practitioner  Subjective:   Chief Complaint   Patient presents with    Follow-up     mwm fu       Patient ID: Jud Kemp  is a 59 y o  male with excess weight/obesity here to pursue weight management  Patient is pursuing Conservative Program      HPI    Pt presents for 2 month follow up       He lost another -2 4lbs since last visit and total -15 1lbs  He is off of metoprolol and glipizide  Last a1c 6 9    He met with RD and was recommended to 60gr carbs w meals and 15gr C with snacks  He noted increase in his fasting BS since this change, previously was 30gr C w meals and 15 gr with snacks  -190  The following portions of the patient's history were reviewed and updated as appropriate: allergies, current medications, past family history, past medical history, past social history, past surgical history and problem list     Review of Systems   Constitutional: Negative for activity change and appetite change  HENT: Negative  Respiratory: Negative  Cardiovascular: Negative  Gastrointestinal: Negative  Genitourinary: Negative  Musculoskeletal: Negative for arthralgias  Skin: Negative for rash  Neurological: Negative  Psychiatric/Behavioral: Negative  Objective:    /66 (BP Location: Left arm, Patient Position: Sitting, Cuff Size: Large)   Pulse 72   Temp 97 8 °F (36 6 °C) (Tympanic)   Resp 18   Ht 5' 8" (1 727 m)   Wt 127 kg (280 lb 14 4 oz)   BMI 42 71 kg/m²      Physical Exam     Constitutional   General appearance: Abnormal   well developed and morbidly obese  Eyes No conjunctival pallor  Ears, Nose, Mouth, and Throat Oral mucosa moist    Pulmonary   Respiratory effort: No increased work of breathing or signs of respiratory distress  Auscultation of lungs: Clear to auscultation, equal breath sounds bilaterally, no wheezes, no rales, no rhonci  Cardiovascular   Auscultation of heart: Normal rate and rhythm, normal S1 and S2, without murmurs  Examination of extremities for edema and/or varicosities: Normal   no edema  Abdomen   Abdomen: Abnormal   The abdomen was obese  Bowel sounds were normal  The abdomen was soft and nontender     Musculoskeletal   Gait and station: Normal     Psychiatric   Orientation to person, place and time: Normal     Affect: appropriate

## 2019-12-30 ENCOUNTER — OFFICE VISIT (OUTPATIENT)
Dept: BARIATRICS | Facility: CLINIC | Age: 64
End: 2019-12-30
Payer: COMMERCIAL

## 2019-12-30 VITALS
WEIGHT: 280.9 LBS | SYSTOLIC BLOOD PRESSURE: 110 MMHG | HEART RATE: 72 BPM | HEIGHT: 68 IN | TEMPERATURE: 97.8 F | BODY MASS INDEX: 42.57 KG/M2 | RESPIRATION RATE: 18 BRPM | DIASTOLIC BLOOD PRESSURE: 66 MMHG

## 2019-12-30 DIAGNOSIS — E78.5 HYPERLIPIDEMIA, UNSPECIFIED HYPERLIPIDEMIA TYPE: ICD-10-CM

## 2019-12-30 DIAGNOSIS — N18.30 TYPE 2 DIABETES MELLITUS WITH STAGE 3 CHRONIC KIDNEY DISEASE, WITHOUT LONG-TERM CURRENT USE OF INSULIN (HCC): ICD-10-CM

## 2019-12-30 DIAGNOSIS — E11.22 TYPE 2 DIABETES MELLITUS WITH STAGE 3 CHRONIC KIDNEY DISEASE, WITHOUT LONG-TERM CURRENT USE OF INSULIN (HCC): ICD-10-CM

## 2019-12-30 DIAGNOSIS — N18.30 CKD (CHRONIC KIDNEY DISEASE) STAGE 3, GFR 30-59 ML/MIN (HCC): ICD-10-CM

## 2019-12-30 DIAGNOSIS — I10 BENIGN ESSENTIAL HYPERTENSION: ICD-10-CM

## 2019-12-30 DIAGNOSIS — E66.01 CLASS 3 SEVERE OBESITY WITH SERIOUS COMORBIDITY AND BODY MASS INDEX (BMI) OF 45.0 TO 49.9 IN ADULT, UNSPECIFIED OBESITY TYPE (HCC): Primary | ICD-10-CM

## 2019-12-30 DIAGNOSIS — E78.2 MIXED HYPERLIPIDEMIA: ICD-10-CM

## 2019-12-30 PROBLEM — E66.813 CLASS 3 SEVERE OBESITY WITH SERIOUS COMORBIDITY AND BODY MASS INDEX (BMI) OF 45.0 TO 49.9 IN ADULT (HCC): Status: RESOLVED | Noted: 2019-05-13 | Resolved: 2019-12-30

## 2019-12-30 PROCEDURE — 99214 OFFICE O/P EST MOD 30 MIN: CPT | Performed by: FAMILY MEDICINE

## 2019-12-30 PROCEDURE — 3066F NEPHROPATHY DOC TX: CPT | Performed by: FAMILY MEDICINE

## 2019-12-30 PROCEDURE — 3078F DIAST BP <80 MM HG: CPT | Performed by: FAMILY MEDICINE

## 2019-12-30 PROCEDURE — 3074F SYST BP LT 130 MM HG: CPT | Performed by: FAMILY MEDICINE

## 2020-01-13 DIAGNOSIS — I10 BENIGN ESSENTIAL HYPERTENSION: ICD-10-CM

## 2020-01-13 DIAGNOSIS — N20.0 NEPHROLITHIASIS: ICD-10-CM

## 2020-01-13 RX ORDER — LISINOPRIL 10 MG/1
10 TABLET ORAL DAILY
Qty: 90 TABLET | Refills: 1 | Status: SHIPPED | OUTPATIENT
Start: 2020-01-13 | End: 2020-07-13

## 2020-01-13 RX ORDER — ALLOPURINOL 100 MG/1
100 TABLET ORAL DAILY
Qty: 90 TABLET | Refills: 1 | Status: SHIPPED | OUTPATIENT
Start: 2020-01-13 | End: 2020-07-13

## 2020-01-16 ENCOUNTER — OFFICE VISIT (OUTPATIENT)
Dept: FAMILY MEDICINE CLINIC | Facility: CLINIC | Age: 65
End: 2020-01-16
Payer: COMMERCIAL

## 2020-01-16 VITALS
SYSTOLIC BLOOD PRESSURE: 114 MMHG | HEART RATE: 70 BPM | RESPIRATION RATE: 16 BRPM | HEIGHT: 68 IN | WEIGHT: 276 LBS | TEMPERATURE: 98.1 F | OXYGEN SATURATION: 98 % | DIASTOLIC BLOOD PRESSURE: 62 MMHG | BODY MASS INDEX: 41.83 KG/M2

## 2020-01-16 DIAGNOSIS — N18.30 CKD (CHRONIC KIDNEY DISEASE) STAGE 3, GFR 30-59 ML/MIN (HCC): ICD-10-CM

## 2020-01-16 DIAGNOSIS — Z01.818 PREOP EXAMINATION: ICD-10-CM

## 2020-01-16 DIAGNOSIS — H26.9 CATARACT OF RIGHT EYE, UNSPECIFIED CATARACT TYPE: ICD-10-CM

## 2020-01-16 DIAGNOSIS — E11.22 TYPE 2 DIABETES MELLITUS WITH STAGE 3 CHRONIC KIDNEY DISEASE, WITHOUT LONG-TERM CURRENT USE OF INSULIN (HCC): ICD-10-CM

## 2020-01-16 DIAGNOSIS — I10 BENIGN ESSENTIAL HYPERTENSION: ICD-10-CM

## 2020-01-16 DIAGNOSIS — N18.30 TYPE 2 DIABETES MELLITUS WITH STAGE 3 CHRONIC KIDNEY DISEASE, WITHOUT LONG-TERM CURRENT USE OF INSULIN (HCC): ICD-10-CM

## 2020-01-16 DIAGNOSIS — Z01.818 PREOPERATIVE EXAMINATION: Primary | ICD-10-CM

## 2020-01-16 PROCEDURE — 99243 OFF/OP CNSLTJ NEW/EST LOW 30: CPT | Performed by: FAMILY MEDICINE

## 2020-01-16 PROCEDURE — 93000 ELECTROCARDIOGRAM COMPLETE: CPT | Performed by: FAMILY MEDICINE

## 2020-01-16 PROCEDURE — 3066F NEPHROPATHY DOC TX: CPT | Performed by: FAMILY MEDICINE

## 2020-01-16 NOTE — PROGRESS NOTES
Assessment/Plan:    Problem List Items Addressed This Visit        Endocrine    Type 2 diabetes mellitus with stage 3 chronic kidney disease, without long-term current use of insulin ([facility] [address] )       Lab Results   Component Value Date    HGBA1C 6 9 (H) 11/14/2019   stable , advised not to take metformin in am of surgery and resume in night after surgery          Relevant Orders    POCT ECG (Completed)       Cardiovascular and Mediastinum    Benign essential hypertension     htn stable , cont same med          Relevant Orders    POCT ECG (Completed)       Genitourinary    CKD (chronic kidney disease) stage 3, GFR 30-59 ml/min (HCC)     Stable             Other    Cataract of right eye      Preoperative examination - Primary  EKG is normal, and labs are reviewed from November, his A1c is stable 6 9 and advised not to take metformin in the morning of surgery, and he can resume it after the surgery in the night  And he is cleared for cataract surgery and is form will be faxed to his surgeon          Chief Complaint   Patient presents with    Pre-op Exam       Subjective:   Patient ID: Jovanna Baum is a 59 y o  male  Procedure: rt cataract surgery   Date of surgery: 1/28/20  Surgeon : Day kong MD  Indication of surgery : poor  vision and right cataract  Surgical Risk Assessment :  Prior anesthesia:yes  Coronary artery disease:non   CHF:non  Wear dentures:non  Sleep apnea:non  TMJ:non  Diabetes mellitus:yes , hemoglobin A1c 6 9, stable on metformin  Hypertension:  Stable on lisinopril  Coagulation disorder:  None  Seizure disorder:  None   exercise capacity:  Able to walk 2 flights of stairs without symptoms:yes  DVT:non         Review of Systems   Constitutional: Negative for activity change, appetite change, chills, diaphoresis, fatigue, fever and unexpected weight change     HENT: Negative for congestion, dental problem, ear discharge, ear pain, facial swelling, hearing loss, mouth sores, nosebleeds, postnasal drip, rhinorrhea, sinus pressure, sinus pain, sneezing, sore throat, trouble swallowing and voice change  Eyes: Positive for visual disturbance  Negative for photophobia, pain, discharge, redness and itching  Respiratory: Negative for cough, chest tightness, shortness of breath and wheezing  Cardiovascular: Negative for chest pain, palpitations and leg swelling  Gastrointestinal: Negative for abdominal distention, abdominal pain, blood in stool, constipation, diarrhea and nausea  Endocrine: Negative for cold intolerance, heat intolerance, polydipsia, polyphagia and polyuria  Genitourinary: Negative for dysuria, flank pain, frequency, hematuria and urgency  Musculoskeletal: Negative for arthralgias, back pain, myalgias and neck pain  Skin: Negative for color change and pallor  Allergic/Immunologic: Negative for environmental allergies and food allergies  Neurological: Negative for dizziness, weakness, light-headedness, numbness and headaches  Hematological: Negative for adenopathy  Does not bruise/bleed easily  Psychiatric/Behavioral: Negative for behavioral problems, sleep disturbance and suicidal ideas  The patient is not nervous/anxious  Objective:  Physical Exam   Constitutional: He is oriented to person, place, and time  He appears well-developed and well-nourished  HENT:   Head: Normocephalic and atraumatic  Nose: Nose normal    Mouth/Throat: Oropharynx is clear and moist  No oropharyngeal exudate  Eyes: Conjunctivae and EOM are normal  Right eye exhibits no discharge  Left eye exhibits no discharge  No scleral icterus  Rt eye cataract   Neck: Normal range of motion  Neck supple  No tracheal deviation present  No thyromegaly present  Cardiovascular: Normal rate, regular rhythm and normal heart sounds  No murmur heard  Pulmonary/Chest: Effort normal and breath sounds normal  No respiratory distress  He has no wheezes  He has no rales  Abdominal: Soft  Bowel sounds are normal  He exhibits no distension and no mass  There is no tenderness  There is no rebound  Musculoskeletal: Normal range of motion  He exhibits no edema  Lymphadenopathy:     He has no cervical adenopathy  Neurological: He is alert and oriented to person, place, and time  He has normal reflexes  No cranial nerve deficit  Skin: Skin is warm  No rash noted  No erythema  No pallor  Psychiatric: He has a normal mood and affect  His behavior is normal  Judgment and thought content normal    Nursing note and vitals reviewed  Past Surgical History:   Procedure Laterality Date    CARPAL TUNNEL RELEASE Bilateral     Last Assessed:9/26/2014    CYSTOSCOPY      EYE SURGERY  09/04/2019    Detached retina    HERNIA REPAIR      Femoral Hernia Repair Unilateral    [place] COLONOSCOPY FLX DX W/COLLJ SPEC WHEN PFRMD N/A 12/13/2018    Procedure: COLONOSCOPY;  Surgeon: Jose Guadalupe Ray MD;  [facility];   Service: Gastroenterology    ID CYSTO/URETERO W/LITHOTRIPSY &INDWELL STENT INSRT Left 8/11/2016    Procedure: CYSTOSCOPY URETEROSCOPY,RETROGRADE PYELOGRAM AND INSERTION STENT URETERAL;  Surgeon: Ernesto Clarke MD;  [facility];  Service: Urology    ID FRAGMENT KIDNEY STONE/ ESWL Right 11/17/2016    Procedure: ESWL;  Surgeon: Ernesto Clarke MD;  [facility];  Service: Urology       Family History   Problem Relation Age of Onset    Coronary artery disease Mother     Heart disease Father     Lung cancer Father     Cancer Father     Stomach cancer Brother          Current Outpatient Medications:     allopurinol (ZYLOPRIM) 100 mg tablet, Take 1 tablet (100 mg total) by mouth daily, Disp: 90 tablet, Rfl: 1    CINNAMON PO, Take 1,000 mg by mouth daily, Disp: , Rfl:     lisinopril (ZESTRIL) 10 mg tablet, Take 1 tablet (10 mg total) by mouth daily, Disp: 90 tablet, Rfl: 1    metFORMIN (GLUCOPHAGE) 850 mg tablet, Take 1 tablet (850 mg total) by mouth 2 (two) times a day with meals, Disp: 180 tablet, Rfl: 0    simvastatin (ZOCOR) 10 mg tablet, TAKE 1 TABLET BY MOUTH DAILY AT BEDTIME (Patient not taking: Reported on 12/30/2019), Disp: 90 tablet, Rfl: 1    Allergies   Allergen Reactions    Penicillins      Unknown as child       Vitals:    01/16/20 0750 01/16/20 0822   BP: 114/62    Pulse: 70    Resp: 16    Temp:  98 1 °F (36 7 °C)   SpO2: 98%    Weight: 125 kg (276 lb)    Height: 5' 8" (1 727 m)

## 2020-01-16 NOTE — ASSESSMENT & PLAN NOTE
Lab Results   Component Value Date    HGBA1C 6 9 (H) 11/14/2019   stable , advised not to take metformin in am of surgery and resume in night after surgery

## 2020-03-03 ENCOUNTER — APPOINTMENT (OUTPATIENT)
Dept: LAB | Facility: CLINIC | Age: 65
End: 2020-03-03
Payer: COMMERCIAL

## 2020-03-03 ENCOUNTER — TRANSCRIBE ORDERS (OUTPATIENT)
Dept: LAB | Facility: CLINIC | Age: 65
End: 2020-03-03

## 2020-03-03 DIAGNOSIS — E11.22 TYPE 2 DIABETES MELLITUS WITH STAGE 3 CHRONIC KIDNEY DISEASE, WITHOUT LONG-TERM CURRENT USE OF INSULIN (HCC): ICD-10-CM

## 2020-03-03 DIAGNOSIS — N18.30 TYPE 2 DIABETES MELLITUS WITH STAGE 3 CHRONIC KIDNEY DISEASE, WITHOUT LONG-TERM CURRENT USE OF INSULIN (HCC): ICD-10-CM

## 2020-03-03 LAB
CHOLEST SERPL-MCNC: 145 MG/DL (ref 50–200)
EST. AVERAGE GLUCOSE BLD GHB EST-MCNC: 148 MG/DL
HBA1C MFR BLD: 6.8 %
HDLC SERPL-MCNC: 32 MG/DL
LDLC SERPL CALC-MCNC: 78 MG/DL (ref 0–100)
NONHDLC SERPL-MCNC: 113 MG/DL
TRIGL SERPL-MCNC: 175 MG/DL

## 2020-03-03 PROCEDURE — 83036 HEMOGLOBIN GLYCOSYLATED A1C: CPT

## 2020-03-03 PROCEDURE — 36415 COLL VENOUS BLD VENIPUNCTURE: CPT

## 2020-03-03 PROCEDURE — 80061 LIPID PANEL: CPT

## 2020-03-24 ENCOUNTER — TELEPHONE (OUTPATIENT)
Dept: BARIATRICS | Facility: CLINIC | Age: 65
End: 2020-03-24

## 2020-03-24 NOTE — TELEPHONE ENCOUNTER
Spoke with patient and gave him the message  from Dr Dionicia Ormond and his appointment for 3/30/2020 was cancelled

## 2020-03-24 NOTE — TELEPHONE ENCOUNTER
Nobody called him from this office about his results  Bc his results were sent to his PCP and not to me  But I saw his recent lab results and it has a note from his PCP that says she will address at his visit on 4/1  I I reviewed them and they are stable, his A1c improved from 6 9--> 6 8 great job    Tx LB

## 2020-03-27 ENCOUNTER — TELEPHONE (OUTPATIENT)
Dept: NEPHROLOGY | Facility: CLINIC | Age: 65
End: 2020-03-27

## 2020-03-27 NOTE — TELEPHONE ENCOUNTER
I called patient to schedule May follow up with Dr Kaur Morales  Patient did not answer  I left a message for patient to call back and schedule

## 2020-04-01 ENCOUNTER — TELEMEDICINE (OUTPATIENT)
Dept: FAMILY MEDICINE CLINIC | Facility: CLINIC | Age: 65
End: 2020-04-01
Payer: COMMERCIAL

## 2020-04-01 DIAGNOSIS — N18.30 TYPE 2 DIABETES MELLITUS WITH STAGE 3 CHRONIC KIDNEY DISEASE, WITHOUT LONG-TERM CURRENT USE OF INSULIN (HCC): Primary | ICD-10-CM

## 2020-04-01 DIAGNOSIS — E78.5 HYPERLIPIDEMIA, UNSPECIFIED HYPERLIPIDEMIA TYPE: ICD-10-CM

## 2020-04-01 DIAGNOSIS — M1A.9XX0 CHRONIC GOUT WITHOUT TOPHUS, UNSPECIFIED CAUSE, UNSPECIFIED SITE: ICD-10-CM

## 2020-04-01 DIAGNOSIS — E11.22 TYPE 2 DIABETES MELLITUS WITH STAGE 3 CHRONIC KIDNEY DISEASE, WITHOUT LONG-TERM CURRENT USE OF INSULIN (HCC): Primary | ICD-10-CM

## 2020-04-01 PROBLEM — E87.5 HYPERKALEMIA: Status: RESOLVED | Noted: 2018-05-21 | Resolved: 2020-04-01

## 2020-04-01 PROBLEM — Z23 NEED FOR INFLUENZA VACCINATION: Status: RESOLVED | Noted: 2018-09-20 | Resolved: 2020-04-01

## 2020-04-01 PROBLEM — Z01.818 PREOPERATIVE EXAMINATION: Status: RESOLVED | Noted: 2020-01-16 | Resolved: 2020-04-01

## 2020-04-01 PROBLEM — H26.9 CATARACT OF RIGHT EYE: Status: RESOLVED | Noted: 2019-12-03 | Resolved: 2020-04-01

## 2020-04-01 PROBLEM — G57.91 NEUROPATHY OF RIGHT FOOT: Status: RESOLVED | Noted: 2019-12-03 | Resolved: 2020-04-01

## 2020-04-01 PROCEDURE — 99213 OFFICE O/P EST LOW 20 MIN: CPT | Performed by: FAMILY MEDICINE

## 2020-05-01 ENCOUNTER — APPOINTMENT (OUTPATIENT)
Dept: LAB | Facility: CLINIC | Age: 65
End: 2020-05-01
Payer: COMMERCIAL

## 2020-05-01 DIAGNOSIS — N18.30 CKD (CHRONIC KIDNEY DISEASE) STAGE 3, GFR 30-59 ML/MIN (HCC): ICD-10-CM

## 2020-05-01 DIAGNOSIS — E11.22 TYPE 2 DIABETES MELLITUS WITH STAGE 3 CHRONIC KIDNEY DISEASE, WITHOUT LONG-TERM CURRENT USE OF INSULIN (HCC): ICD-10-CM

## 2020-05-01 DIAGNOSIS — N18.30 TYPE 2 DIABETES MELLITUS WITH STAGE 3 CHRONIC KIDNEY DISEASE, WITHOUT LONG-TERM CURRENT USE OF INSULIN (HCC): ICD-10-CM

## 2020-05-01 LAB
25(OH)D3 SERPL-MCNC: 22.3 NG/ML (ref 30–100)
ALBUMIN SERPL BCP-MCNC: 3.6 G/DL (ref 3.5–5)
ALP SERPL-CCNC: 89 U/L (ref 46–116)
ALT SERPL W P-5'-P-CCNC: 23 U/L (ref 12–78)
ANION GAP SERPL CALCULATED.3IONS-SCNC: 9 MMOL/L (ref 4–13)
AST SERPL W P-5'-P-CCNC: 16 U/L (ref 5–45)
BILIRUB SERPL-MCNC: 0.41 MG/DL (ref 0.2–1)
BUN SERPL-MCNC: 26 MG/DL (ref 5–25)
CALCIUM SERPL-MCNC: 9.3 MG/DL (ref 8.3–10.1)
CHLORIDE SERPL-SCNC: 103 MMOL/L (ref 100–108)
CO2 SERPL-SCNC: 27 MMOL/L (ref 21–32)
CREAT SERPL-MCNC: 1.42 MG/DL (ref 0.6–1.3)
CREAT UR-MCNC: 119 MG/DL
ERYTHROCYTE [DISTWIDTH] IN BLOOD BY AUTOMATED COUNT: 14.2 % (ref 11.6–15.1)
GFR SERPL CREATININE-BSD FRML MDRD: 52 ML/MIN/1.73SQ M
GLUCOSE P FAST SERPL-MCNC: 134 MG/DL (ref 65–99)
HCT VFR BLD AUTO: 43.9 % (ref 36.5–49.3)
HGB BLD-MCNC: 13.8 G/DL (ref 12–17)
MCH RBC QN AUTO: 28.8 PG (ref 26.8–34.3)
MCHC RBC AUTO-ENTMCNC: 31.4 G/DL (ref 31.4–37.4)
MCV RBC AUTO: 92 FL (ref 82–98)
PLATELET # BLD AUTO: 185 THOUSANDS/UL (ref 149–390)
PMV BLD AUTO: 11.3 FL (ref 8.9–12.7)
POTASSIUM SERPL-SCNC: 4.7 MMOL/L (ref 3.5–5.3)
PROT SERPL-MCNC: 7.2 G/DL (ref 6.4–8.2)
PROT UR-MCNC: 13 MG/DL
PROT/CREAT UR: 0.11 MG/G{CREAT} (ref 0–0.1)
RBC # BLD AUTO: 4.8 MILLION/UL (ref 3.88–5.62)
SODIUM SERPL-SCNC: 139 MMOL/L (ref 136–145)
WBC # BLD AUTO: 9.23 THOUSAND/UL (ref 4.31–10.16)

## 2020-05-01 PROCEDURE — 82570 ASSAY OF URINE CREATININE: CPT

## 2020-05-01 PROCEDURE — 36415 COLL VENOUS BLD VENIPUNCTURE: CPT

## 2020-05-01 PROCEDURE — 85027 COMPLETE CBC AUTOMATED: CPT

## 2020-05-01 PROCEDURE — 80053 COMPREHEN METABOLIC PANEL: CPT

## 2020-05-01 PROCEDURE — 84156 ASSAY OF PROTEIN URINE: CPT

## 2020-05-01 PROCEDURE — 82306 VITAMIN D 25 HYDROXY: CPT

## 2020-05-06 ENCOUNTER — TELEPHONE (OUTPATIENT)
Dept: NEPHROLOGY | Facility: CLINIC | Age: 65
End: 2020-05-06

## 2020-05-14 ENCOUNTER — TELEPHONE (OUTPATIENT)
Dept: NEPHROLOGY | Facility: CLINIC | Age: 65
End: 2020-05-14

## 2020-05-20 ENCOUNTER — TELEMEDICINE (OUTPATIENT)
Dept: NEPHROLOGY | Facility: CLINIC | Age: 65
End: 2020-05-20
Payer: COMMERCIAL

## 2020-05-20 VITALS
WEIGHT: 260 LBS | HEART RATE: 70 BPM | SYSTOLIC BLOOD PRESSURE: 120 MMHG | DIASTOLIC BLOOD PRESSURE: 68 MMHG | BODY MASS INDEX: 39.53 KG/M2

## 2020-05-20 DIAGNOSIS — N18.30 CKD (CHRONIC KIDNEY DISEASE) STAGE 3, GFR 30-59 ML/MIN (HCC): ICD-10-CM

## 2020-05-20 DIAGNOSIS — I10 BENIGN ESSENTIAL HYPERTENSION: Primary | ICD-10-CM

## 2020-05-20 DIAGNOSIS — E78.2 MIXED HYPERLIPIDEMIA: ICD-10-CM

## 2020-05-20 PROCEDURE — 3078F DIAST BP <80 MM HG: CPT | Performed by: NURSE PRACTITIONER

## 2020-05-20 PROCEDURE — 3074F SYST BP LT 130 MM HG: CPT | Performed by: NURSE PRACTITIONER

## 2020-05-20 PROCEDURE — 99214 OFFICE O/P EST MOD 30 MIN: CPT | Performed by: NURSE PRACTITIONER

## 2020-05-20 RX ORDER — SIMVASTATIN 10 MG
10 TABLET ORAL 3 TIMES WEEKLY
COMMUNITY
End: 2020-08-05

## 2020-05-21 ENCOUNTER — TRANSCRIBE ORDERS (OUTPATIENT)
Dept: LAB | Facility: CLINIC | Age: 65
End: 2020-05-21

## 2020-05-21 ENCOUNTER — HOSPITAL ENCOUNTER (OUTPATIENT)
Dept: RADIOLOGY | Facility: HOSPITAL | Age: 65
Discharge: HOME | End: 2020-05-21
Payer: COMMERCIAL

## 2020-05-21 ENCOUNTER — APPOINTMENT (OUTPATIENT)
Dept: LAB | Facility: CLINIC | Age: 65
End: 2020-05-21
Payer: COMMERCIAL

## 2020-05-21 DIAGNOSIS — N20.0 NEPHROLITHIASIS: ICD-10-CM

## 2020-05-21 DIAGNOSIS — Z12.5 SCREENING FOR PROSTATE CANCER: ICD-10-CM

## 2020-05-21 LAB — PSA SERPL-MCNC: 1.5 NG/ML (ref 0–4)

## 2020-05-21 PROCEDURE — G0103 PSA SCREENING: HCPCS

## 2020-05-21 PROCEDURE — 36415 COLL VENOUS BLD VENIPUNCTURE: CPT

## 2020-05-21 PROCEDURE — 74018 RADEX ABDOMEN 1 VIEW: CPT

## 2020-05-26 ENCOUNTER — TELEMEDICINE (OUTPATIENT)
Dept: UROLOGY | Facility: AMBULATORY SURGERY CENTER | Age: 65
End: 2020-05-26
Payer: COMMERCIAL

## 2020-05-26 DIAGNOSIS — N20.0 NEPHROLITHIASIS: Primary | ICD-10-CM

## 2020-05-26 DIAGNOSIS — Z12.5 SCREENING FOR PROSTATE CANCER: ICD-10-CM

## 2020-05-26 PROCEDURE — 99213 OFFICE O/P EST LOW 20 MIN: CPT | Performed by: NURSE PRACTITIONER

## 2020-07-11 DIAGNOSIS — N20.0 NEPHROLITHIASIS: ICD-10-CM

## 2020-07-11 DIAGNOSIS — I10 BENIGN ESSENTIAL HYPERTENSION: ICD-10-CM

## 2020-07-13 PROCEDURE — 4010F ACE/ARB THERAPY RXD/TAKEN: CPT | Performed by: FAMILY MEDICINE

## 2020-07-13 RX ORDER — ALLOPURINOL 100 MG/1
TABLET ORAL
Qty: 90 TABLET | Refills: 1 | Status: SHIPPED | OUTPATIENT
Start: 2020-07-13 | End: 2021-01-10

## 2020-07-13 RX ORDER — LISINOPRIL 10 MG/1
TABLET ORAL
Qty: 90 TABLET | Refills: 1 | Status: SHIPPED | OUTPATIENT
Start: 2020-07-13 | End: 2021-01-10

## 2020-07-27 ENCOUNTER — APPOINTMENT (OUTPATIENT)
Dept: LAB | Facility: CLINIC | Age: 65
End: 2020-07-27
Payer: COMMERCIAL

## 2020-07-27 DIAGNOSIS — E78.5 HYPERLIPIDEMIA, UNSPECIFIED HYPERLIPIDEMIA TYPE: ICD-10-CM

## 2020-07-27 DIAGNOSIS — E11.22 TYPE 2 DIABETES MELLITUS WITH STAGE 3 CHRONIC KIDNEY DISEASE, WITHOUT LONG-TERM CURRENT USE OF INSULIN (HCC): ICD-10-CM

## 2020-07-27 DIAGNOSIS — N18.30 TYPE 2 DIABETES MELLITUS WITH STAGE 3 CHRONIC KIDNEY DISEASE, WITHOUT LONG-TERM CURRENT USE OF INSULIN (HCC): ICD-10-CM

## 2020-07-27 DIAGNOSIS — M1A.9XX0 CHRONIC GOUT WITHOUT TOPHUS, UNSPECIFIED CAUSE, UNSPECIFIED SITE: ICD-10-CM

## 2020-07-27 LAB
ALBUMIN SERPL BCP-MCNC: 3.3 G/DL (ref 3.5–5)
ALP SERPL-CCNC: 77 U/L (ref 46–116)
ALT SERPL W P-5'-P-CCNC: 21 U/L (ref 12–78)
ANION GAP SERPL CALCULATED.3IONS-SCNC: 11 MMOL/L (ref 4–13)
AST SERPL W P-5'-P-CCNC: 13 U/L (ref 5–45)
BASOPHILS # BLD AUTO: 0.06 THOUSANDS/ΜL (ref 0–0.1)
BASOPHILS NFR BLD AUTO: 1 % (ref 0–1)
BILIRUB SERPL-MCNC: 0.39 MG/DL (ref 0.2–1)
BUN SERPL-MCNC: 29 MG/DL (ref 5–25)
CALCIUM SERPL-MCNC: 8.5 MG/DL (ref 8.3–10.1)
CHLORIDE SERPL-SCNC: 103 MMOL/L (ref 100–108)
CHOLEST SERPL-MCNC: 129 MG/DL (ref 50–200)
CO2 SERPL-SCNC: 22 MMOL/L (ref 21–32)
CREAT SERPL-MCNC: 1.34 MG/DL (ref 0.6–1.3)
CREAT UR-MCNC: 79.6 MG/DL
EOSINOPHIL # BLD AUTO: 0.33 THOUSAND/ΜL (ref 0–0.61)
EOSINOPHIL NFR BLD AUTO: 4 % (ref 0–6)
ERYTHROCYTE [DISTWIDTH] IN BLOOD BY AUTOMATED COUNT: 14.6 % (ref 11.6–15.1)
EST. AVERAGE GLUCOSE BLD GHB EST-MCNC: 146 MG/DL
GFR SERPL CREATININE-BSD FRML MDRD: 56 ML/MIN/1.73SQ M
GLUCOSE P FAST SERPL-MCNC: 131 MG/DL (ref 65–99)
HBA1C MFR BLD: 6.7 %
HCT VFR BLD AUTO: 41.2 % (ref 36.5–49.3)
HDLC SERPL-MCNC: 36 MG/DL
HGB BLD-MCNC: 13 G/DL (ref 12–17)
IMM GRANULOCYTES # BLD AUTO: 0.04 THOUSAND/UL (ref 0–0.2)
IMM GRANULOCYTES NFR BLD AUTO: 0 % (ref 0–2)
LDLC SERPL CALC-MCNC: 69 MG/DL (ref 0–100)
LYMPHOCYTES # BLD AUTO: 2.53 THOUSANDS/ΜL (ref 0.6–4.47)
LYMPHOCYTES NFR BLD AUTO: 27 % (ref 14–44)
MCH RBC QN AUTO: 28.8 PG (ref 26.8–34.3)
MCHC RBC AUTO-ENTMCNC: 31.6 G/DL (ref 31.4–37.4)
MCV RBC AUTO: 91 FL (ref 82–98)
MICROALBUMIN UR-MCNC: 11.8 MG/L (ref 0–20)
MICROALBUMIN/CREAT 24H UR: 15 MG/G CREATININE (ref 0–30)
MONOCYTES # BLD AUTO: 0.66 THOUSAND/ΜL (ref 0.17–1.22)
MONOCYTES NFR BLD AUTO: 7 % (ref 4–12)
NEUTROPHILS # BLD AUTO: 5.78 THOUSANDS/ΜL (ref 1.85–7.62)
NEUTS SEG NFR BLD AUTO: 61 % (ref 43–75)
NONHDLC SERPL-MCNC: 93 MG/DL
NRBC BLD AUTO-RTO: 0 /100 WBCS
PLATELET # BLD AUTO: 178 THOUSANDS/UL (ref 149–390)
PMV BLD AUTO: 11.7 FL (ref 8.9–12.7)
POTASSIUM SERPL-SCNC: 4.2 MMOL/L (ref 3.5–5.3)
PROT SERPL-MCNC: 6.6 G/DL (ref 6.4–8.2)
RBC # BLD AUTO: 4.51 MILLION/UL (ref 3.88–5.62)
SODIUM SERPL-SCNC: 136 MMOL/L (ref 136–145)
TRIGL SERPL-MCNC: 121 MG/DL
URATE SERPL-MCNC: 7.6 MG/DL (ref 4.2–8)
WBC # BLD AUTO: 9.4 THOUSAND/UL (ref 4.31–10.16)

## 2020-07-27 PROCEDURE — 80053 COMPREHEN METABOLIC PANEL: CPT

## 2020-07-27 PROCEDURE — 82570 ASSAY OF URINE CREATININE: CPT | Performed by: FAMILY MEDICINE

## 2020-07-27 PROCEDURE — 3044F HG A1C LEVEL LT 7.0%: CPT | Performed by: FAMILY MEDICINE

## 2020-07-27 PROCEDURE — 85025 COMPLETE CBC W/AUTO DIFF WBC: CPT

## 2020-07-27 PROCEDURE — 3061F NEG MICROALBUMINURIA REV: CPT | Performed by: FAMILY MEDICINE

## 2020-07-27 PROCEDURE — 80061 LIPID PANEL: CPT

## 2020-07-27 PROCEDURE — 36415 COLL VENOUS BLD VENIPUNCTURE: CPT

## 2020-07-27 PROCEDURE — 83036 HEMOGLOBIN GLYCOSYLATED A1C: CPT

## 2020-07-27 PROCEDURE — 84550 ASSAY OF BLOOD/URIC ACID: CPT

## 2020-07-27 PROCEDURE — 82043 UR ALBUMIN QUANTITATIVE: CPT | Performed by: FAMILY MEDICINE

## 2020-08-05 ENCOUNTER — OFFICE VISIT (OUTPATIENT)
Dept: FAMILY MEDICINE CLINIC | Facility: CLINIC | Age: 65
End: 2020-08-05
Payer: COMMERCIAL

## 2020-08-05 VITALS
RESPIRATION RATE: 16 BRPM | WEIGHT: 263 LBS | DIASTOLIC BLOOD PRESSURE: 80 MMHG | BODY MASS INDEX: 39.86 KG/M2 | HEART RATE: 80 BPM | OXYGEN SATURATION: 97 % | HEIGHT: 68 IN | SYSTOLIC BLOOD PRESSURE: 130 MMHG | TEMPERATURE: 98.2 F

## 2020-08-05 DIAGNOSIS — E11.22 TYPE 2 DIABETES MELLITUS WITH STAGE 3 CHRONIC KIDNEY DISEASE, WITHOUT LONG-TERM CURRENT USE OF INSULIN (HCC): ICD-10-CM

## 2020-08-05 DIAGNOSIS — N18.30 TYPE 2 DIABETES MELLITUS WITH STAGE 3 CHRONIC KIDNEY DISEASE, WITHOUT LONG-TERM CURRENT USE OF INSULIN (HCC): ICD-10-CM

## 2020-08-05 DIAGNOSIS — E78.2 MIXED HYPERLIPIDEMIA: ICD-10-CM

## 2020-08-05 DIAGNOSIS — M1A.9XX0 CHRONIC GOUT WITHOUT TOPHUS, UNSPECIFIED CAUSE, UNSPECIFIED SITE: ICD-10-CM

## 2020-08-05 DIAGNOSIS — I10 BENIGN ESSENTIAL HYPERTENSION: Primary | ICD-10-CM

## 2020-08-05 PROBLEM — Z23 NEED FOR VACCINATION FOR PNEUMOCOCCUS: Status: RESOLVED | Noted: 2019-05-13 | Resolved: 2020-08-05

## 2020-08-05 PROBLEM — Z12.11 SCREENING FOR COLON CANCER: Status: RESOLVED | Noted: 2018-09-20 | Resolved: 2020-08-05

## 2020-08-05 PROBLEM — Z11.59 NEED FOR HEPATITIS C SCREENING TEST: Status: RESOLVED | Noted: 2019-05-13 | Resolved: 2020-08-05

## 2020-08-05 PROCEDURE — 3079F DIAST BP 80-89 MM HG: CPT | Performed by: FAMILY MEDICINE

## 2020-08-05 PROCEDURE — 99214 OFFICE O/P EST MOD 30 MIN: CPT | Performed by: FAMILY MEDICINE

## 2020-08-05 PROCEDURE — 3725F SCREEN DEPRESSION PERFORMED: CPT | Performed by: FAMILY MEDICINE

## 2020-08-05 PROCEDURE — 3075F SYST BP GE 130 - 139MM HG: CPT | Performed by: FAMILY MEDICINE

## 2020-08-05 PROCEDURE — 1036F TOBACCO NON-USER: CPT | Performed by: FAMILY MEDICINE

## 2020-08-05 PROCEDURE — 2022F DILAT RTA XM EVC RTNOPTHY: CPT | Performed by: FAMILY MEDICINE

## 2020-08-05 PROCEDURE — 3008F BODY MASS INDEX DOCD: CPT | Performed by: FAMILY MEDICINE

## 2020-08-05 RX ORDER — MULTIVITAMIN
1 CAPSULE ORAL DAILY
COMMUNITY

## 2020-08-05 NOTE — PROGRESS NOTES
Assessment/Plan:    Problem List Items Addressed This Visit        Endocrine    Type 2 diabetes mellitus with stage 3 chronic kidney disease, without long-term current use of insulin ([facility] [address] )       Lab Results   Component Value Date    HGBA1C 6 7 (H) 07/27/2020   A1c is improving, he is also losing weight continue the same and the target is A1c around 6 5         Relevant Orders    CBC and differential    Comprehensive metabolic panel    Hemoglobin A1C    Lipid panel    TSH, 3rd generation       Cardiovascular and Mediastinum    Benign essential hypertension - Primary     Recheck blood pressure 130/80, his home readings are even better, advised to continue monitoring blood pressure if he feels dizzy and the blood pressure is low the lisinopril dose can be reduced  Relevant Orders    CBC and differential    TSH, 3rd generation       Other    Gout     No recent gout attack, on his allopurinol         Hyperlipidemia     But improving diet and losing weight his cholesterol also improved and he is not on any statins  Lab Results   Component Value Date    LDLCALC 69 07/27/2020   LDL is at the goal               Lab Results   Component Value Date    CREATININE 1 34 (H) 07/27/2020   Creatinine improved      Chief Complaint   Patient presents with    Follow-up       Subjective:   Patient ID: Charlynn Halsted is a 59 y o  male      He is here follow-up on diabetes, hypertension and he has been seeing nephrologist for chronic kidney disease, he is trying to lose weight and eating small portions and less carbs,   He denies any new headache chest pain shortness of breath, he has slight leg swelling which is chronic,  He also follows urologist for history of kidney stones and drinks plenty of fluids, he does not take metoprolol anymore, and he monitors his blood pressure at home and his blood pressure readings are normal, he does not feel dizzy      Review of Systems   Constitutional: Negative for activity change, appetite change, chills, diaphoresis, fatigue, fever and unexpected weight change  HENT: Negative for congestion, dental problem, ear discharge, ear pain, facial swelling, hearing loss, mouth sores, nosebleeds, postnasal drip, rhinorrhea, sinus pressure, sinus pain, sneezing, sore throat, trouble swallowing and voice change  Eyes: Negative for photophobia, pain, discharge, redness and itching  Respiratory: Negative for cough, chest tightness, shortness of breath and wheezing  Cardiovascular: Negative for chest pain, palpitations and leg swelling  Gastrointestinal: Negative for abdominal distention, abdominal pain, blood in stool, constipation, diarrhea and nausea  Endocrine: Negative for cold intolerance, heat intolerance, polydipsia, polyphagia and polyuria  Genitourinary: Negative for dysuria, flank pain, frequency, hematuria and urgency  Musculoskeletal: Negative for arthralgias, back pain, myalgias and neck pain  Skin: Negative for color change and pallor  Allergic/Immunologic: Negative for environmental allergies and food allergies  Neurological: Negative for dizziness, weakness, light-headedness, numbness and headaches  Hematological: Negative for adenopathy  Does not bruise/bleed easily  Psychiatric/Behavioral: Negative for behavioral problems, sleep disturbance and suicidal ideas  The patient is not nervous/anxious  Objective:  Physical Exam   Constitutional: He is oriented to person, place, and time  He appears well-developed  HENT:   Head: Normocephalic and atraumatic  Eyes: Pupils are equal, round, and reactive to light  Conjunctivae are normal  Right eye exhibits no discharge  Left eye exhibits no discharge  No scleral icterus  Neck: Normal range of motion  Neck supple  No tracheal deviation present  No thyromegaly present  Cardiovascular: Normal rate, regular rhythm and normal heart sounds  No murmur heard    Pulmonary/Chest: Effort normal and breath sounds normal  No respiratory distress  He has no wheezes  He has no rales  Abdominal: Soft  Bowel sounds are normal  He exhibits no distension and no mass  There is no abdominal tenderness  There is no rebound  Musculoskeletal: Normal range of motion  Lymphadenopathy:     He has no cervical adenopathy  Neurological: He is alert and oriented to person, place, and time  He has normal reflexes  No cranial nerve deficit  Skin: Skin is warm  No rash noted  No erythema  No pallor  Psychiatric: His behavior is normal  Judgment and thought content normal    Nursing note and vitals reviewed  BMI Counseling: Body mass index is 39 99 kg/m²  The BMI is above normal  Nutrition recommendations include decreasing portion sizes, limiting drinks that contain sugar, moderation in carbohydrate intake and reducing intake of cholesterol  Exercise recommendations include exercising 3-5 times per week  Past Surgical History:   Procedure Laterality Date    CARPAL TUNNEL RELEASE Bilateral     Last Assessed:9/26/2014    CYSTOSCOPY      EYE SURGERY  09/04/2019    Detached retina    HERNIA REPAIR      Femoral Hernia Repair Unilateral    [place] COLONOSCOPY FLX DX W/COLLJ SPEC WHEN PFRMD N/A 12/13/2018    Procedure: COLONOSCOPY;  Surgeon: Leana Gutierrez MD;  [facility];   Service: Gastroenterology    [place] CYSTO/URETERO W/LITHOTRIPSY &INDWELL STENT INSRT Left 8/11/2016    Procedure: CYSTOSCOPY URETEROSCOPY,RETROGRADE PYELOGRAM AND INSERTION STENT URETERAL;  Surgeon: Kaylen Espitia MD;  [facility];  Service: Urology    [place] FRAGMENT KIDNEY STONE/ ESWL Right 11/17/2016    Procedure: ESWL;  Surgeon: Kaylen Espitia MD;  [facility];  Service: Urology       Family History   Problem Relation Age of Onset    Coronary artery disease Mother     Heart disease Father     Lung cancer Father     Cancer Father     Stomach cancer Brother          Current Outpatient Medications:     allopurinol (ZYLOPRIM) 100 mg tablet, TAKE 1 TABLET BY MOUTH EVERY DAY, Disp: 90 tablet, Rfl: 1    CINNAMON PO, Take 1,000 mg by mouth daily, Disp: , Rfl:     lisinopril (ZESTRIL) 10 mg tablet, TAKE 1 TABLET BY MOUTH EVERY DAY, Disp: 90 tablet, Rfl: 1    metFORMIN (GLUCOPHAGE) 850 mg tablet, Take 1 tablet (850 mg total) by mouth 2 (two) times a day with meals, Disp: 180 tablet, Rfl: 1    Multiple Vitamin (multivitamin) capsule, Take 1 capsule by mouth daily, Disp: , Rfl:     Allergies   Allergen Reactions    Penicillins      Unknown as child       Vitals:    08/05/20 0758 08/05/20 0819   BP: 144/78 130/80   Pulse: 80    Resp: 16    Temp: 98 2 °F (36 8 °C)    SpO2: 97%    Weight: 119 kg (263 lb)    Height: 5' 8"

## 2020-08-05 NOTE — ASSESSMENT & PLAN NOTE
Lab Results   Component Value Date    HGBA1C 6 7 (H) 07/27/2020   A1c is improving, he is also losing weight continue the same and the target is A1c around 6 5

## 2020-08-05 NOTE — ASSESSMENT & PLAN NOTE
Recheck blood pressure 130/80, his home readings are even better, advised to continue monitoring blood pressure if he feels dizzy and the blood pressure is low the lisinopril dose can be reduced

## 2020-08-05 NOTE — ASSESSMENT & PLAN NOTE
But improving diet and losing weight his cholesterol also improved and he is not on any statins  Lab Results   Component Value Date    LDLCALC 69 07/27/2020   LDL is at the goal

## 2020-08-20 LAB
LEFT EYE DIABETIC RETINOPATHY: NORMAL
RIGHT EYE DIABETIC RETINOPATHY: NORMAL

## 2020-09-10 LAB
LEFT EYE DIABETIC RETINOPATHY: NORMAL
RIGHT EYE DIABETIC RETINOPATHY: NORMAL

## 2020-09-22 ENCOUNTER — TELEPHONE (OUTPATIENT)
Dept: NEPHROLOGY | Facility: CLINIC | Age: 65
End: 2020-09-22

## 2020-09-22 NOTE — TELEPHONE ENCOUNTER
A message was left requesting a call back tot he office to schedule patient for a November follow up with provider

## 2020-10-10 DIAGNOSIS — E78.5 HYPERLIPIDEMIA, UNSPECIFIED HYPERLIPIDEMIA TYPE: ICD-10-CM

## 2020-11-03 ENCOUNTER — TELEPHONE (OUTPATIENT)
Dept: NEPHROLOGY | Facility: CLINIC | Age: 65
End: 2020-11-03

## 2020-11-03 ENCOUNTER — LAB (OUTPATIENT)
Dept: LAB | Facility: CLINIC | Age: 65
End: 2020-11-03
Payer: MEDICARE

## 2020-11-03 ENCOUNTER — TRANSCRIBE ORDERS (OUTPATIENT)
Dept: LAB | Facility: CLINIC | Age: 65
End: 2020-11-03

## 2020-11-03 DIAGNOSIS — N18.30 CKD (CHRONIC KIDNEY DISEASE) STAGE 3, GFR 30-59 ML/MIN (HCC): ICD-10-CM

## 2020-11-03 DIAGNOSIS — N18.30 TYPE 2 DIABETES MELLITUS WITH STAGE 3 CHRONIC KIDNEY DISEASE, WITHOUT LONG-TERM CURRENT USE OF INSULIN (HCC): ICD-10-CM

## 2020-11-03 DIAGNOSIS — I10 BENIGN ESSENTIAL HYPERTENSION: ICD-10-CM

## 2020-11-03 DIAGNOSIS — E78.2 MIXED HYPERLIPIDEMIA: ICD-10-CM

## 2020-11-03 DIAGNOSIS — E11.22 TYPE 2 DIABETES MELLITUS WITH STAGE 3 CHRONIC KIDNEY DISEASE, WITHOUT LONG-TERM CURRENT USE OF INSULIN (HCC): ICD-10-CM

## 2020-11-03 LAB
25(OH)D3 SERPL-MCNC: 31.4 NG/ML (ref 30–100)
ALBUMIN SERPL BCP-MCNC: 3.5 G/DL (ref 3.5–5)
ALP SERPL-CCNC: 82 U/L (ref 46–116)
ALT SERPL W P-5'-P-CCNC: 23 U/L (ref 12–78)
ANION GAP SERPL CALCULATED.3IONS-SCNC: 10 MMOL/L (ref 4–13)
AST SERPL W P-5'-P-CCNC: 27 U/L (ref 5–45)
BACTERIA UR QL AUTO: NORMAL /HPF
BASOPHILS # BLD AUTO: 0.05 THOUSANDS/ΜL (ref 0–0.1)
BASOPHILS NFR BLD AUTO: 1 % (ref 0–1)
BILIRUB SERPL-MCNC: 0.51 MG/DL (ref 0.2–1)
BILIRUB UR QL STRIP: NEGATIVE
BUN SERPL-MCNC: 29 MG/DL (ref 5–25)
CALCIUM SERPL-MCNC: 9.3 MG/DL (ref 8.3–10.1)
CHLORIDE SERPL-SCNC: 103 MMOL/L (ref 100–108)
CHOLEST SERPL-MCNC: 159 MG/DL (ref 50–200)
CLARITY UR: CLEAR
CO2 SERPL-SCNC: 26 MMOL/L (ref 21–32)
COLOR UR: YELLOW
CREAT SERPL-MCNC: 1.44 MG/DL (ref 0.6–1.3)
CREAT UR-MCNC: 54 MG/DL
EOSINOPHIL # BLD AUTO: 0.18 THOUSAND/ΜL (ref 0–0.61)
EOSINOPHIL NFR BLD AUTO: 3 % (ref 0–6)
ERYTHROCYTE [DISTWIDTH] IN BLOOD BY AUTOMATED COUNT: 14.3 % (ref 11.6–15.1)
EST. AVERAGE GLUCOSE BLD GHB EST-MCNC: 137 MG/DL
GFR SERPL CREATININE-BSD FRML MDRD: 51 ML/MIN/1.73SQ M
GLUCOSE P FAST SERPL-MCNC: 147 MG/DL (ref 65–99)
GLUCOSE UR STRIP-MCNC: NEGATIVE MG/DL
HBA1C MFR BLD: 6.4 %
HCT VFR BLD AUTO: 45.7 % (ref 36.5–49.3)
HDLC SERPL-MCNC: 44 MG/DL
HGB BLD-MCNC: 14.4 G/DL (ref 12–17)
HGB UR QL STRIP.AUTO: NEGATIVE
IMM GRANULOCYTES # BLD AUTO: 0.03 THOUSAND/UL (ref 0–0.2)
IMM GRANULOCYTES NFR BLD AUTO: 0 % (ref 0–2)
KETONES UR STRIP-MCNC: NEGATIVE MG/DL
LDLC SERPL CALC-MCNC: 77 MG/DL (ref 0–100)
LEUKOCYTE ESTERASE UR QL STRIP: NEGATIVE
LYMPHOCYTES # BLD AUTO: 2.37 THOUSANDS/ΜL (ref 0.6–4.47)
LYMPHOCYTES NFR BLD AUTO: 32 % (ref 14–44)
MAGNESIUM SERPL-MCNC: 2 MG/DL (ref 1.6–2.6)
MCH RBC QN AUTO: 29 PG (ref 26.8–34.3)
MCHC RBC AUTO-ENTMCNC: 31.5 G/DL (ref 31.4–37.4)
MCV RBC AUTO: 92 FL (ref 82–98)
MONOCYTES # BLD AUTO: 0.56 THOUSAND/ΜL (ref 0.17–1.22)
MONOCYTES NFR BLD AUTO: 8 % (ref 4–12)
NEUTROPHILS # BLD AUTO: 4.13 THOUSANDS/ΜL (ref 1.85–7.62)
NEUTS SEG NFR BLD AUTO: 56 % (ref 43–75)
NITRITE UR QL STRIP: NEGATIVE
NON-SQ EPI CELLS URNS QL MICRO: NORMAL /HPF
NONHDLC SERPL-MCNC: 115 MG/DL
NRBC BLD AUTO-RTO: 0 /100 WBCS
PH UR STRIP.AUTO: 5.5 [PH]
PHOSPHATE SERPL-MCNC: 3.6 MG/DL (ref 2.3–4.1)
PLATELET # BLD AUTO: 188 THOUSANDS/UL (ref 149–390)
PMV BLD AUTO: 11.1 FL (ref 8.9–12.7)
POTASSIUM SERPL-SCNC: 5 MMOL/L (ref 3.5–5.3)
PROT SERPL-MCNC: 7.2 G/DL (ref 6.4–8.2)
PROT UR STRIP-MCNC: NEGATIVE MG/DL
PROT UR-MCNC: 8 MG/DL
PROT/CREAT UR: 0.15 MG/G{CREAT} (ref 0–0.1)
PTH-INTACT SERPL-MCNC: 40 PG/ML (ref 18.4–80.1)
RBC # BLD AUTO: 4.97 MILLION/UL (ref 3.88–5.62)
RBC #/AREA URNS AUTO: NORMAL /HPF
SODIUM SERPL-SCNC: 139 MMOL/L (ref 136–145)
SP GR UR STRIP.AUTO: 1.02 (ref 1–1.03)
TRIGL SERPL-MCNC: 189 MG/DL
TSH SERPL DL<=0.05 MIU/L-ACNC: 2.58 UIU/ML (ref 0.36–3.74)
UROBILINOGEN UR QL STRIP.AUTO: 0.2 E.U./DL
WBC # BLD AUTO: 7.32 THOUSAND/UL (ref 4.31–10.16)
WBC #/AREA URNS AUTO: NORMAL /HPF

## 2020-11-03 PROCEDURE — 80053 COMPREHEN METABOLIC PANEL: CPT

## 2020-11-03 PROCEDURE — 83036 HEMOGLOBIN GLYCOSYLATED A1C: CPT

## 2020-11-03 PROCEDURE — 82570 ASSAY OF URINE CREATININE: CPT

## 2020-11-03 PROCEDURE — 84100 ASSAY OF PHOSPHORUS: CPT

## 2020-11-03 PROCEDURE — 36415 COLL VENOUS BLD VENIPUNCTURE: CPT

## 2020-11-03 PROCEDURE — 80061 LIPID PANEL: CPT

## 2020-11-03 PROCEDURE — 84443 ASSAY THYROID STIM HORMONE: CPT

## 2020-11-03 PROCEDURE — 83970 ASSAY OF PARATHORMONE: CPT

## 2020-11-03 PROCEDURE — 81001 URINALYSIS AUTO W/SCOPE: CPT

## 2020-11-03 PROCEDURE — 83735 ASSAY OF MAGNESIUM: CPT

## 2020-11-03 PROCEDURE — 82306 VITAMIN D 25 HYDROXY: CPT

## 2020-11-03 PROCEDURE — 84156 ASSAY OF PROTEIN URINE: CPT

## 2020-11-03 PROCEDURE — 85025 COMPLETE CBC W/AUTO DIFF WBC: CPT

## 2020-12-07 ENCOUNTER — OFFICE VISIT (OUTPATIENT)
Dept: FAMILY MEDICINE CLINIC | Facility: CLINIC | Age: 65
End: 2020-12-07
Payer: MEDICARE

## 2020-12-07 VITALS
RESPIRATION RATE: 16 BRPM | WEIGHT: 269 LBS | OXYGEN SATURATION: 98 % | SYSTOLIC BLOOD PRESSURE: 140 MMHG | HEIGHT: 68 IN | DIASTOLIC BLOOD PRESSURE: 80 MMHG | HEART RATE: 71 BPM | BODY MASS INDEX: 40.77 KG/M2

## 2020-12-07 DIAGNOSIS — E11.22 TYPE 2 DIABETES MELLITUS WITH STAGE 3A CHRONIC KIDNEY DISEASE, WITHOUT LONG-TERM CURRENT USE OF INSULIN (HCC): ICD-10-CM

## 2020-12-07 DIAGNOSIS — Z00.00 WELCOME TO MEDICARE PREVENTIVE VISIT: Primary | ICD-10-CM

## 2020-12-07 DIAGNOSIS — Z23 NEEDS FLU SHOT: ICD-10-CM

## 2020-12-07 DIAGNOSIS — N18.31 TYPE 2 DIABETES MELLITUS WITH STAGE 3A CHRONIC KIDNEY DISEASE, WITHOUT LONG-TERM CURRENT USE OF INSULIN (HCC): ICD-10-CM

## 2020-12-07 DIAGNOSIS — I10 BENIGN ESSENTIAL HYPERTENSION: ICD-10-CM

## 2020-12-07 PROCEDURE — 99213 OFFICE O/P EST LOW 20 MIN: CPT | Performed by: FAMILY MEDICINE

## 2020-12-07 PROCEDURE — 90662 IIV NO PRSV INCREASED AG IM: CPT | Performed by: FAMILY MEDICINE

## 2020-12-07 PROCEDURE — G0008 ADMIN INFLUENZA VIRUS VAC: HCPCS | Performed by: FAMILY MEDICINE

## 2020-12-07 PROCEDURE — G0402 INITIAL PREVENTIVE EXAM: HCPCS | Performed by: FAMILY MEDICINE

## 2021-01-10 DIAGNOSIS — I10 BENIGN ESSENTIAL HYPERTENSION: ICD-10-CM

## 2021-01-10 DIAGNOSIS — N20.0 NEPHROLITHIASIS: ICD-10-CM

## 2021-01-10 RX ORDER — ALLOPURINOL 100 MG/1
TABLET ORAL
Qty: 90 TABLET | Refills: 1 | Status: SHIPPED | OUTPATIENT
Start: 2021-01-10 | End: 2021-07-14 | Stop reason: SDUPTHER

## 2021-01-10 RX ORDER — LISINOPRIL 10 MG/1
TABLET ORAL
Qty: 90 TABLET | Refills: 1 | Status: SHIPPED | OUTPATIENT
Start: 2021-01-10 | End: 2021-07-14 | Stop reason: SDUPTHER

## 2021-03-06 ENCOUNTER — LAB (OUTPATIENT)
Dept: LAB | Facility: CLINIC | Age: 66
End: 2021-03-06
Payer: MEDICARE

## 2021-03-06 DIAGNOSIS — E11.22 TYPE 2 DIABETES MELLITUS WITH STAGE 3A CHRONIC KIDNEY DISEASE, WITHOUT LONG-TERM CURRENT USE OF INSULIN (HCC): ICD-10-CM

## 2021-03-06 DIAGNOSIS — N18.31 TYPE 2 DIABETES MELLITUS WITH STAGE 3A CHRONIC KIDNEY DISEASE, WITHOUT LONG-TERM CURRENT USE OF INSULIN (HCC): ICD-10-CM

## 2021-03-06 LAB
ALBUMIN SERPL BCP-MCNC: 3.6 G/DL (ref 3.5–5)
ALP SERPL-CCNC: 82 U/L (ref 46–116)
ALT SERPL W P-5'-P-CCNC: 27 U/L (ref 12–78)
ANION GAP SERPL CALCULATED.3IONS-SCNC: 10 MMOL/L (ref 4–13)
AST SERPL W P-5'-P-CCNC: 23 U/L (ref 5–45)
BASOPHILS # BLD AUTO: 0.05 THOUSANDS/ΜL (ref 0–0.1)
BASOPHILS NFR BLD AUTO: 1 % (ref 0–1)
BILIRUB SERPL-MCNC: 0.53 MG/DL (ref 0.2–1)
BUN SERPL-MCNC: 30 MG/DL (ref 5–25)
CALCIUM SERPL-MCNC: 9.3 MG/DL (ref 8.3–10.1)
CHLORIDE SERPL-SCNC: 101 MMOL/L (ref 100–108)
CHOLEST SERPL-MCNC: 151 MG/DL (ref 50–200)
CO2 SERPL-SCNC: 26 MMOL/L (ref 21–32)
CREAT SERPL-MCNC: 1.52 MG/DL (ref 0.6–1.3)
EOSINOPHIL # BLD AUTO: 0.26 THOUSAND/ΜL (ref 0–0.61)
EOSINOPHIL NFR BLD AUTO: 3 % (ref 0–6)
ERYTHROCYTE [DISTWIDTH] IN BLOOD BY AUTOMATED COUNT: 13.8 % (ref 11.6–15.1)
EST. AVERAGE GLUCOSE BLD GHB EST-MCNC: 143 MG/DL
GFR SERPL CREATININE-BSD FRML MDRD: 47 ML/MIN/1.73SQ M
GLUCOSE P FAST SERPL-MCNC: 148 MG/DL (ref 65–99)
HBA1C MFR BLD: 6.6 %
HCT VFR BLD AUTO: 44.8 % (ref 36.5–49.3)
HDLC SERPL-MCNC: 44 MG/DL
HGB BLD-MCNC: 14 G/DL (ref 12–17)
IMM GRANULOCYTES # BLD AUTO: 0.04 THOUSAND/UL (ref 0–0.2)
IMM GRANULOCYTES NFR BLD AUTO: 0 % (ref 0–2)
LDLC SERPL CALC-MCNC: 80 MG/DL (ref 0–100)
LYMPHOCYTES # BLD AUTO: 2.3 THOUSANDS/ΜL (ref 0.6–4.47)
LYMPHOCYTES NFR BLD AUTO: 25 % (ref 14–44)
MCH RBC QN AUTO: 28.7 PG (ref 26.8–34.3)
MCHC RBC AUTO-ENTMCNC: 31.3 G/DL (ref 31.4–37.4)
MCV RBC AUTO: 92 FL (ref 82–98)
MONOCYTES # BLD AUTO: 0.65 THOUSAND/ΜL (ref 0.17–1.22)
MONOCYTES NFR BLD AUTO: 7 % (ref 4–12)
NEUTROPHILS # BLD AUTO: 5.76 THOUSANDS/ΜL (ref 1.85–7.62)
NEUTS SEG NFR BLD AUTO: 64 % (ref 43–75)
NONHDLC SERPL-MCNC: 107 MG/DL
NRBC BLD AUTO-RTO: 0 /100 WBCS
PLATELET # BLD AUTO: 172 THOUSANDS/UL (ref 149–390)
PMV BLD AUTO: 11.6 FL (ref 8.9–12.7)
POTASSIUM SERPL-SCNC: 5.8 MMOL/L (ref 3.5–5.3)
PROT SERPL-MCNC: 7.4 G/DL (ref 6.4–8.2)
RBC # BLD AUTO: 4.87 MILLION/UL (ref 3.88–5.62)
SODIUM SERPL-SCNC: 137 MMOL/L (ref 136–145)
TRIGL SERPL-MCNC: 137 MG/DL
WBC # BLD AUTO: 9.06 THOUSAND/UL (ref 4.31–10.16)

## 2021-03-06 PROCEDURE — 80061 LIPID PANEL: CPT

## 2021-03-06 PROCEDURE — 85025 COMPLETE CBC W/AUTO DIFF WBC: CPT

## 2021-03-06 PROCEDURE — 80053 COMPREHEN METABOLIC PANEL: CPT

## 2021-03-06 PROCEDURE — 36415 COLL VENOUS BLD VENIPUNCTURE: CPT

## 2021-03-06 PROCEDURE — 83036 HEMOGLOBIN GLYCOSYLATED A1C: CPT

## 2021-03-09 DIAGNOSIS — E87.5 HYPERKALEMIA: Primary | ICD-10-CM

## 2021-03-09 NOTE — PROGRESS NOTES
I spoke with the patient and advised to reduce the potassium containing diet, as he was eating banana every day, his potassium is elevated, his renal insufficiency getting worse, a message is sent to his nephrologist, and he will follow-up with the nephrologist   And will repeat potassium in few days, encouraged him to drink more fluids

## 2021-03-15 ENCOUNTER — TRANSCRIBE ORDERS (OUTPATIENT)
Dept: LAB | Facility: CLINIC | Age: 66
End: 2021-03-15

## 2021-03-15 ENCOUNTER — APPOINTMENT (OUTPATIENT)
Dept: LAB | Facility: CLINIC | Age: 66
End: 2021-03-15
Payer: MEDICARE

## 2021-03-15 DIAGNOSIS — E87.5 HYPERKALEMIA: ICD-10-CM

## 2021-03-15 LAB
ANION GAP SERPL CALCULATED.3IONS-SCNC: 9 MMOL/L (ref 4–13)
BUN SERPL-MCNC: 34 MG/DL (ref 5–25)
CALCIUM SERPL-MCNC: 8.9 MG/DL (ref 8.3–10.1)
CHLORIDE SERPL-SCNC: 105 MMOL/L (ref 100–108)
CO2 SERPL-SCNC: 25 MMOL/L (ref 21–32)
CREAT SERPL-MCNC: 1.41 MG/DL (ref 0.6–1.3)
GFR SERPL CREATININE-BSD FRML MDRD: 52 ML/MIN/1.73SQ M
GLUCOSE SERPL-MCNC: 141 MG/DL (ref 65–140)
POTASSIUM SERPL-SCNC: 4.4 MMOL/L (ref 3.5–5.3)
SODIUM SERPL-SCNC: 139 MMOL/L (ref 136–145)

## 2021-03-15 PROCEDURE — 80048 BASIC METABOLIC PNL TOTAL CA: CPT

## 2021-03-15 PROCEDURE — 36415 COLL VENOUS BLD VENIPUNCTURE: CPT

## 2021-03-29 ENCOUNTER — OFFICE VISIT (OUTPATIENT)
Dept: FAMILY MEDICINE CLINIC | Facility: CLINIC | Age: 66
End: 2021-03-29
Payer: MEDICARE

## 2021-03-29 VITALS
HEIGHT: 68 IN | WEIGHT: 278 LBS | BODY MASS INDEX: 42.13 KG/M2 | SYSTOLIC BLOOD PRESSURE: 120 MMHG | DIASTOLIC BLOOD PRESSURE: 66 MMHG | RESPIRATION RATE: 16 BRPM | OXYGEN SATURATION: 98 % | HEART RATE: 75 BPM

## 2021-03-29 DIAGNOSIS — I10 BENIGN ESSENTIAL HYPERTENSION: Primary | ICD-10-CM

## 2021-03-29 DIAGNOSIS — N18.31 TYPE 2 DIABETES MELLITUS WITH STAGE 3A CHRONIC KIDNEY DISEASE, WITHOUT LONG-TERM CURRENT USE OF INSULIN (HCC): ICD-10-CM

## 2021-03-29 DIAGNOSIS — E78.2 MIXED HYPERLIPIDEMIA: ICD-10-CM

## 2021-03-29 DIAGNOSIS — E11.22 TYPE 2 DIABETES MELLITUS WITH STAGE 3A CHRONIC KIDNEY DISEASE, WITHOUT LONG-TERM CURRENT USE OF INSULIN (HCC): ICD-10-CM

## 2021-03-29 PROCEDURE — 99214 OFFICE O/P EST MOD 30 MIN: CPT | Performed by: FAMILY MEDICINE

## 2021-03-29 NOTE — ASSESSMENT & PLAN NOTE
His blood pressure is stable he will continue lisinopril 10 mg and he follows nephrologist and urologist

## 2021-03-29 NOTE — PROGRESS NOTES
Assessment/Plan:    Problem List Items Addressed This Visit        Endocrine    Type 2 diabetes mellitus with stage 3 chronic kidney disease, without long-term current use of insulin ([facility] [address] )       Lab Results   Component Value Date    HGBA1C 6 6 (H) 03/06/2021   His A1c slightly up, discussed about importance of losing some more weight, cutting down the carbs and will repeat labs in few months and continue metformin the same dose         Relevant Orders    CBC and differential    Comprehensive metabolic panel    Hemoglobin A1C    TSH, 3rd generation    Lipid panel       Cardiovascular and Mediastinum    Benign essential hypertension - Primary     His blood pressure is stable he will continue lisinopril 10 mg and he follows nephrologist and urologist            Relevant Orders    TSH, 3rd generation    Lipid panel       Other    BMI 40 0-44 9, adult ([facility] [address] )    Hyperlipidemia    Relevant Orders    Lipid panel      Discussed with him that if his blood sugar is not well controlled next time he might need more medication, he is determined to lose some more weight and work on his diet so his A1c will go down,  Last year he was unable to go to weight management due to the [place] pandemic  Follow-up in 4 months after the labs  Chief Complaint   Patient presents with    Follow-up       Subjective:   Patient ID: Mo Workman is a 72 y o  male      Follow-up on diabetes, he has gained 10 lb in last year, he is taking metformin, he has chronic kidney disease and follows nephrologist and he also follows urologist, he is on allopurinol and he does not have any he is in gout attack, he is eating low-fat diet, his cholesterol has been under control without any statins in the past he used to be on simvastatin once he lost significant weight and his cholesterol was in good range simvastatin was stopped,  He denies any headache chest pain or shortness of breath, he has no recent leg swelling, he has normal urine and bowel movements      Review of Systems   Constitutional: Negative for activity change, appetite change, chills, fatigue, fever and unexpected weight change  HENT: Negative for congestion, ear discharge, ear pain, nosebleeds, postnasal drip, rhinorrhea, sinus pressure, sneezing, sore throat, trouble swallowing and voice change  Eyes: Negative for photophobia, pain, discharge, redness and itching  Respiratory: Negative for cough, chest tightness, shortness of breath and wheezing  Cardiovascular: Negative for chest pain, palpitations and leg swelling  Gastrointestinal: Negative for abdominal pain, constipation, diarrhea, nausea and vomiting  Endocrine: Negative for polyuria  Genitourinary: Negative for dysuria, frequency and urgency  Musculoskeletal: Negative for arthralgias, back pain, myalgias and neck pain  Skin: Negative for color change, pallor and rash  Allergic/Immunologic: Negative for environmental allergies and food allergies  Neurological: Negative for dizziness, weakness, light-headedness and headaches  Hematological: Negative for adenopathy  Does not bruise/bleed easily  Psychiatric/Behavioral: Negative for behavioral problems  The patient is not nervous/anxious  Objective:  Physical Exam  Vitals signs and nursing note reviewed  Constitutional:       Appearance: He is well-developed  HENT:      Head: Normocephalic and atraumatic  Mouth/Throat:      Pharynx: No oropharyngeal exudate  Eyes:      General: No scleral icterus  Right eye: No discharge  Left eye: No discharge  Extraocular Movements: Extraocular movements intact  Conjunctiva/sclera: Conjunctivae normal       Pupils: Pupils are equal, round, and reactive to light  Neck:      Musculoskeletal: Normal range of motion and neck supple  Thyroid: No thyromegaly  Trachea: No tracheal deviation  Cardiovascular:      Rate and Rhythm: Normal rate and regular rhythm        Heart sounds: Normal heart sounds  No murmur  Pulmonary:      Effort: Pulmonary effort is normal  No respiratory distress  Breath sounds: Normal breath sounds  No wheezing or rales  Abdominal:      General: There is no distension  Palpations: Abdomen is soft  There is no mass  Tenderness: There is no abdominal tenderness  There is no rebound  Musculoskeletal: Normal range of motion  Lymphadenopathy:      Cervical: No cervical adenopathy  Skin:     General: Skin is warm  Coloration: Skin is not pale  Findings: No erythema or rash  Neurological:      Mental Status: He is alert and oriented to person, place, and time  Cranial Nerves: No cranial nerve deficit  Deep Tendon Reflexes: Reflexes are normal and symmetric  Psychiatric:         Behavior: Behavior normal          Thought Content: Thought content normal          Judgment: Judgment normal             Past Surgical History:   Procedure Laterality Date    CARPAL TUNNEL RELEASE Bilateral     Last Assessed:9/26/2014    CYSTOSCOPY      EYE SURGERY  09/04/2019    Detached retina    HERNIA REPAIR      Femoral Hernia Repair Unilateral    [place] COLONOSCOPY FLX DX W/COLLJ SPEC WHEN PFRMD N/A 12/13/2018    Procedure: COLONOSCOPY;  Surgeon: Allison Elliott MD;  [facility];   Service: Gastroenterology    [place] CYSTO/URETERO W/LITHOTRIPSY &INDWELL STENT INSRT Left 8/11/2016    Procedure: CYSTOSCOPY URETEROSCOPY,RETROGRADE PYELOGRAM AND INSERTION STENT URETERAL;  Surgeon: Slade Nino MD;  [facility];  Service: Urology    [place] FRAGMENT KIDNEY STONE/ ESWL Right 11/17/2016    Procedure: ESWL;  Surgeon: Slade Nino MD;  [facility];  Service: Urology       Family History   Problem Relation Age of Onset    Coronary artery disease Mother     Heart disease Father     Lung cancer Father     Cancer Father     Stomach cancer Brother          Current Outpatient Medications:     allopurinol (ZYLOPRIM) 100 mg tablet, TAKE 1 TABLET BY MOUTH EVERY DAY, Disp: 90 tablet, Rfl: 1    CINNAMON PO, Take 1,000 mg by mouth daily, Disp: , Rfl:     lisinopril (ZESTRIL) 10 mg tablet, TAKE 1 TABLET BY MOUTH EVERY DAY, Disp: 90 tablet, Rfl: 1    metFORMIN (GLUCOPHAGE) 850 mg tablet, TAKE 1 TABLET (850 MG TOTAL) BY MOUTH 2 (TWO) TIMES A DAY WITH MEALS, Disp: 180 tablet, Rfl: 1    Multiple Vitamin (multivitamin) capsule, Take 1 capsule by mouth daily, Disp: , Rfl:     Allergies   Allergen Reactions    Penicillins      Unknown as child       Vitals:    03/29/21 0758   BP: 120/66   Pulse: 75   Resp: 16   SpO2: 98%   Weight: 126 kg (278 lb)   Height: 5' 8" (1 727 m)

## 2021-03-29 NOTE — ASSESSMENT & PLAN NOTE
Lab Results   Component Value Date    HGBA1C 6 6 (H) 03/06/2021   His A1c slightly up, discussed about importance of losing some more weight, cutting down the carbs and will repeat labs in few months and continue metformin the same dose

## 2021-03-30 ENCOUNTER — TELEPHONE (OUTPATIENT)
Dept: FAMILY MEDICINE CLINIC | Facility: HOSPITAL | Age: 66
End: 2021-03-30

## 2021-04-01 ENCOUNTER — IMMUNIZATIONS (OUTPATIENT)
Dept: FAMILY MEDICINE CLINIC | Facility: HOSPITAL | Age: 66
End: 2021-04-01

## 2021-04-01 DIAGNOSIS — Z23 ENCOUNTER FOR IMMUNIZATION: Primary | ICD-10-CM

## 2021-04-01 PROCEDURE — 0001A SARS-COV-2 / COVID-19 MRNA VACCINE (PFIZER-BIONTECH) 30 MCG: CPT

## 2021-04-01 PROCEDURE — 91300 SARS-COV-2 / COVID-19 MRNA VACCINE (PFIZER-BIONTECH) 30 MCG: CPT

## 2021-04-09 DIAGNOSIS — E78.5 HYPERLIPIDEMIA, UNSPECIFIED HYPERLIPIDEMIA TYPE: ICD-10-CM

## 2021-04-22 ENCOUNTER — IMMUNIZATIONS (OUTPATIENT)
Dept: FAMILY MEDICINE CLINIC | Facility: HOSPITAL | Age: 66
End: 2021-04-22

## 2021-04-22 DIAGNOSIS — Z23 ENCOUNTER FOR IMMUNIZATION: Primary | ICD-10-CM

## 2021-04-22 PROCEDURE — 0002A SARS-COV-2 / COVID-19 MRNA VACCINE (PFIZER-BIONTECH) 30 MCG: CPT | Performed by: NURSE PRACTITIONER

## 2021-04-22 PROCEDURE — 91300 SARS-COV-2 / COVID-19 MRNA VACCINE (PFIZER-BIONTECH) 30 MCG: CPT | Performed by: NURSE PRACTITIONER

## 2021-05-26 ENCOUNTER — TRANSCRIBE ORDERS (OUTPATIENT)
Dept: LAB | Facility: CLINIC | Age: 66
End: 2021-05-26

## 2021-05-26 ENCOUNTER — APPOINTMENT (OUTPATIENT)
Dept: LAB | Facility: CLINIC | Age: 66
End: 2021-05-26
Payer: MEDICARE

## 2021-05-26 DIAGNOSIS — Z12.5 SCREENING FOR PROSTATE CANCER: ICD-10-CM

## 2021-05-26 LAB — PSA SERPL-MCNC: 2.2 NG/ML (ref 0–4)

## 2021-05-26 PROCEDURE — G0103 PSA SCREENING: HCPCS

## 2021-05-26 PROCEDURE — 36415 COLL VENOUS BLD VENIPUNCTURE: CPT

## 2021-05-28 NOTE — PROGRESS NOTES
6/1/2021      Chief Complaint   Patient presents with    screening for prostste cancer     Assessment and Plan    1  Nephrolithiasis   - Pt asymptomatic and denies any flank pain or kidney stone episodes  - Previous imaging negative for urinary calculi  - Will obtain imaging on as needed basis  - Continue adequate hydration and dietary modifications for stone prevention  2  Prostate cancer screening  - Most recent PSA from 5/26/21 was 2 2  Previously PSA was 1 5 on 5/21/20  - GRZEGORZ unremarkable  - Repeat PSA in 1 year  History of Present Illness  Oliver Villalpando is a 72 y o  male here for follow up evaluation of  prostate cancer screening and nephrolithiasis  Patient underwent right-sided ESWL in 2016  Most recent imaging in May 2020 was negative for any urinary calculi  In regards to prostate cancer screening most recent PSA from 05/26/2021 was 2 2  Previous PSA from 05/21/2020 was 1 5  He denies any known family history of prostate cancer  Patient denies any urinary complaints or flank pain  Review of Systems   Constitutional: Negative for chills and fever  Respiratory: Negative for shortness of breath  Cardiovascular: Negative for chest pain  Gastrointestinal: Negative for abdominal pain, constipation, diarrhea, nausea and vomiting  Genitourinary: Negative for difficulty urinating, dysuria, flank pain, frequency, hematuria and urgency  Neurological: Negative for dizziness                    Past Medical History  Past Medical History:   Diagnosis Date    Diabetes mellitus ([facility] [address] )     Gout     Hypertension     Leukocytosis     Onset:4/26/2012    Onychomycosis     Onse:4/26/2012    Other muscle spasm     Renal calculi     Shoulder joint pain        Past Social History  Past Surgical History:   Procedure Laterality Date    CARPAL TUNNEL RELEASE Bilateral     Last Assessed:9/26/2014    CYSTOSCOPY      EYE SURGERY  09/04/2019    Detached retina    HERNIA REPAIR Femoral Hernia Repair Unilateral    TN COLONOSCOPY FLX DX W/COLLJ SPEC WHEN PFRMD N/A 12/13/2018    Procedure: COLONOSCOPY;  Surgeon: Kenny Michaud MD;  [facility];   Service: Gastroenterology    TN CYSTO/URETERO W/LITHOTRIPSY &INDWELL STENT INSRT Left 8/11/2016    Procedure: CYSTOSCOPY URETEROSCOPY,RETROGRADE PYELOGRAM AND INSERTION STENT URETERAL;  Surgeon: Asher Cancino MD;  [facility];  Service: Urology    [facility]    Procedure: ESWL;  Surgeon: Asher aCncino MD;  [facility];  Service: Urology     Social History     Tobacco Use   Smoking Status Never Smoker   Smokeless Tobacco Never Used       Past Family History  Family History   Problem Relation Age of Onset    Coronary artery disease Mother     Heart disease Father     Lung cancer Father     Cancer Father     Stomach cancer Brother        Past Social history  Social History     Socioeconomic History    Marital status: Single     Spouse name: Not on file    Number of children: Not on file    Years of education: Not on file    Highest education level: Not on file   Occupational History    Occupation: RETIRED   Social Needs    Financial resource strain: Not on file    Food insecurity     Worry: Not on file     Inability: Not on file   [employer] needs     Medical: Not on file     Non-medical: Not on file   Tobacco Use    Smoking status: Never Smoker    Smokeless tobacco: Never Used   Substance and Sexual Activity    Alcohol use: Not Currently    Drug use: No    Sexual activity: Not on file   Lifestyle    Physical activity     Days per week: Not on file     Minutes per session: Not on file    Stress: Not on file   Relationships    Social connections     Talks on phone: Not on file     Gets together: Not on file     Attends Shinto service: Not on file     Active member of club or organization: Not on file     Attends meetings of clubs or organizations: Not on file     Relationship status: Not on file    Intimate partner violence     Fear of current or ex partner: Not on file     Emotionally abused: Not on file     Physically abused: Not on file     Forced sexual activity: Not on file   Other Topics Concern    Not on file   Social History Narrative    Living independently alone       Current Medications  Current Outpatient Medications   Medication Sig Dispense Refill    allopurinol (ZYLOPRIM) 100 mg tablet TAKE 1 TABLET BY MOUTH EVERY DAY 90 tablet 1    CINNAMON PO Take 1,000 mg by mouth daily      lisinopril (ZESTRIL) 10 mg tablet TAKE 1 TABLET BY MOUTH EVERY DAY 90 tablet 1    metFORMIN (GLUCOPHAGE) 850 mg tablet TAKE 1 TABLET (850 MG TOTAL) BY MOUTH 2 (TWO) TIMES A DAY WITH MEALS 180 tablet 1    Multiple Vitamin (multivitamin) capsule Take 1 capsule by mouth daily       No current facility-administered medications for this visit  Allergies  Allergies   Allergen Reactions    Penicillins      Unknown as child         The following portions of the patient's history were reviewed and updated as appropriate: allergies, current medications, past medical history, past social history, past surgical history and problem list       Vitals  Vitals:    06/01/21 0739   BP: 110/80   Pulse: 72   Weight: 126 kg (278 lb)   Height: 5' 8" (1 727 m)           Physical Exam  Physical Exam  Constitutional:       Appearance: Normal appearance  He is obese  HENT:      Head: Normocephalic and atraumatic  Right Ear: External ear normal       Left Ear: External ear normal    Eyes:      General: No scleral icterus  Conjunctiva/sclera: Conjunctivae normal    Neck:      Musculoskeletal: Normal range of motion  Pulmonary:      Effort: Pulmonary effort is normal    Genitourinary:     Comments: Prostate approximately 35 g  Smooth, firm, uniform consistency without nodules or tenderness  Musculoskeletal: Normal range of motion  Skin:     General: Skin is warm and dry  Neurological:      General: No focal deficit present  Mental Status: He is alert and oriented to person, place, and time  Psychiatric:         Mood and Affect: Mood normal          Behavior: Behavior normal          Thought Content: Thought content normal          Judgment: Judgment normal            Results  No results found for this or any previous visit (from the past 1 hour(s)) ]  Lab Results   Component Value Date    PSA 2 2 05/26/2021    PSA 1 5 05/21/2020    PSA 1 4 05/21/2019     Lab Results   Component Value Date    GLUCOSE 142 (H) 06/06/2014    CALCIUM 8 9 03/15/2021     08/01/2016    K 4 4 03/15/2021    CO2 25 03/15/2021     03/15/2021    BUN 34 (H) 03/15/2021    CREATININE 1 41 (H) 03/15/2021     Lab Results   Component Value Date    WBC 9 06 03/06/2021    HGB 14 0 03/06/2021    HCT 44 8 03/06/2021    MCV 92 03/06/2021     03/06/2021           Orders  No orders of the defined types were placed in this encounter        Agustin Leyva PA-C

## 2021-06-01 ENCOUNTER — OFFICE VISIT (OUTPATIENT)
Dept: UROLOGY | Facility: CLINIC | Age: 66
End: 2021-06-01
Payer: MEDICARE

## 2021-06-01 VITALS
BODY MASS INDEX: 42.13 KG/M2 | SYSTOLIC BLOOD PRESSURE: 110 MMHG | WEIGHT: 278 LBS | HEART RATE: 72 BPM | DIASTOLIC BLOOD PRESSURE: 80 MMHG | HEIGHT: 68 IN

## 2021-06-01 DIAGNOSIS — Z12.5 SCREENING FOR PROSTATE CANCER: Primary | ICD-10-CM

## 2021-06-01 PROCEDURE — 99213 OFFICE O/P EST LOW 20 MIN: CPT | Performed by: PHYSICIAN ASSISTANT

## 2021-06-29 ENCOUNTER — APPOINTMENT (OUTPATIENT)
Dept: LAB | Facility: CLINIC | Age: 66
End: 2021-06-29
Payer: MEDICARE

## 2021-06-29 DIAGNOSIS — E11.22 TYPE 2 DIABETES MELLITUS WITH STAGE 3A CHRONIC KIDNEY DISEASE, WITHOUT LONG-TERM CURRENT USE OF INSULIN (HCC): ICD-10-CM

## 2021-06-29 DIAGNOSIS — N18.31 TYPE 2 DIABETES MELLITUS WITH STAGE 3A CHRONIC KIDNEY DISEASE, WITHOUT LONG-TERM CURRENT USE OF INSULIN (HCC): ICD-10-CM

## 2021-06-29 DIAGNOSIS — E78.2 MIXED HYPERLIPIDEMIA: ICD-10-CM

## 2021-06-29 DIAGNOSIS — I10 BENIGN ESSENTIAL HYPERTENSION: ICD-10-CM

## 2021-06-29 LAB
ALBUMIN SERPL BCP-MCNC: 3.5 G/DL (ref 3.5–5)
ALP SERPL-CCNC: 85 U/L (ref 46–116)
ALT SERPL W P-5'-P-CCNC: 28 U/L (ref 12–78)
ANION GAP SERPL CALCULATED.3IONS-SCNC: 10 MMOL/L (ref 4–13)
AST SERPL W P-5'-P-CCNC: 19 U/L (ref 5–45)
BASOPHILS # BLD AUTO: 0.05 THOUSANDS/ΜL (ref 0–0.1)
BASOPHILS NFR BLD AUTO: 1 % (ref 0–1)
BILIRUB SERPL-MCNC: 0.65 MG/DL (ref 0.2–1)
BUN SERPL-MCNC: 32 MG/DL (ref 5–25)
CALCIUM SERPL-MCNC: 8.9 MG/DL (ref 8.3–10.1)
CHLORIDE SERPL-SCNC: 104 MMOL/L (ref 100–108)
CHOLEST SERPL-MCNC: 153 MG/DL (ref 50–200)
CO2 SERPL-SCNC: 22 MMOL/L (ref 21–32)
CREAT SERPL-MCNC: 1.55 MG/DL (ref 0.6–1.3)
EOSINOPHIL # BLD AUTO: 0.26 THOUSAND/ΜL (ref 0–0.61)
EOSINOPHIL NFR BLD AUTO: 3 % (ref 0–6)
ERYTHROCYTE [DISTWIDTH] IN BLOOD BY AUTOMATED COUNT: 14.1 % (ref 11.6–15.1)
EST. AVERAGE GLUCOSE BLD GHB EST-MCNC: 140 MG/DL
GFR SERPL CREATININE-BSD FRML MDRD: 46 ML/MIN/1.73SQ M
GLUCOSE P FAST SERPL-MCNC: 155 MG/DL (ref 65–99)
HBA1C MFR BLD: 6.5 %
HCT VFR BLD AUTO: 41.4 % (ref 36.5–49.3)
HDLC SERPL-MCNC: 36 MG/DL
HGB BLD-MCNC: 13.5 G/DL (ref 12–17)
IMM GRANULOCYTES # BLD AUTO: 0.05 THOUSAND/UL (ref 0–0.2)
IMM GRANULOCYTES NFR BLD AUTO: 1 % (ref 0–2)
LDLC SERPL CALC-MCNC: 82 MG/DL (ref 0–100)
LYMPHOCYTES # BLD AUTO: 2.81 THOUSANDS/ΜL (ref 0.6–4.47)
LYMPHOCYTES NFR BLD AUTO: 28 % (ref 14–44)
MCH RBC QN AUTO: 29.6 PG (ref 26.8–34.3)
MCHC RBC AUTO-ENTMCNC: 32.6 G/DL (ref 31.4–37.4)
MCV RBC AUTO: 91 FL (ref 82–98)
MONOCYTES # BLD AUTO: 0.82 THOUSAND/ΜL (ref 0.17–1.22)
MONOCYTES NFR BLD AUTO: 8 % (ref 4–12)
NEUTROPHILS # BLD AUTO: 5.92 THOUSANDS/ΜL (ref 1.85–7.62)
NEUTS SEG NFR BLD AUTO: 59 % (ref 43–75)
NONHDLC SERPL-MCNC: 117 MG/DL
NRBC BLD AUTO-RTO: 0 /100 WBCS
PLATELET # BLD AUTO: 187 THOUSANDS/UL (ref 149–390)
PMV BLD AUTO: 11 FL (ref 8.9–12.7)
POTASSIUM SERPL-SCNC: 4.2 MMOL/L (ref 3.5–5.3)
PROT SERPL-MCNC: 6.9 G/DL (ref 6.4–8.2)
RBC # BLD AUTO: 4.56 MILLION/UL (ref 3.88–5.62)
SODIUM SERPL-SCNC: 136 MMOL/L (ref 136–145)
TRIGL SERPL-MCNC: 177 MG/DL
TSH SERPL DL<=0.05 MIU/L-ACNC: 2.59 UIU/ML (ref 0.36–3.74)
WBC # BLD AUTO: 9.91 THOUSAND/UL (ref 4.31–10.16)

## 2021-06-29 PROCEDURE — 84443 ASSAY THYROID STIM HORMONE: CPT

## 2021-06-29 PROCEDURE — 80053 COMPREHEN METABOLIC PANEL: CPT

## 2021-06-29 PROCEDURE — 36415 COLL VENOUS BLD VENIPUNCTURE: CPT

## 2021-06-29 PROCEDURE — 83036 HEMOGLOBIN GLYCOSYLATED A1C: CPT

## 2021-06-29 PROCEDURE — 80061 LIPID PANEL: CPT

## 2021-06-29 PROCEDURE — 85025 COMPLETE CBC W/AUTO DIFF WBC: CPT

## 2021-07-14 DIAGNOSIS — N20.0 NEPHROLITHIASIS: ICD-10-CM

## 2021-07-14 DIAGNOSIS — I10 BENIGN ESSENTIAL HYPERTENSION: ICD-10-CM

## 2021-07-14 RX ORDER — LISINOPRIL 10 MG/1
10 TABLET ORAL DAILY
Qty: 90 TABLET | Refills: 1 | Status: SHIPPED | OUTPATIENT
Start: 2021-07-14 | End: 2022-01-10 | Stop reason: SDUPTHER

## 2021-07-14 RX ORDER — ALLOPURINOL 100 MG/1
100 TABLET ORAL DAILY
Qty: 90 TABLET | Refills: 1 | Status: SHIPPED | OUTPATIENT
Start: 2021-07-14 | End: 2022-01-10 | Stop reason: SDUPTHER

## 2021-07-28 PROBLEM — E66.01 MORBID OBESITY WITH BMI OF 40.0-44.9, ADULT (HCC): Status: ACTIVE | Noted: 2018-04-25

## 2021-08-02 ENCOUNTER — OFFICE VISIT (OUTPATIENT)
Dept: FAMILY MEDICINE CLINIC | Facility: CLINIC | Age: 66
End: 2021-08-02
Payer: MEDICARE

## 2021-08-02 VITALS
HEART RATE: 73 BPM | HEIGHT: 68 IN | OXYGEN SATURATION: 98 % | RESPIRATION RATE: 16 BRPM | BODY MASS INDEX: 41.98 KG/M2 | DIASTOLIC BLOOD PRESSURE: 70 MMHG | SYSTOLIC BLOOD PRESSURE: 130 MMHG | WEIGHT: 277 LBS

## 2021-08-02 DIAGNOSIS — E11.22 TYPE 2 DIABETES MELLITUS WITH STAGE 3A CHRONIC KIDNEY DISEASE, WITHOUT LONG-TERM CURRENT USE OF INSULIN (HCC): Primary | ICD-10-CM

## 2021-08-02 DIAGNOSIS — Z11.4 SCREENING FOR HIV (HUMAN IMMUNODEFICIENCY VIRUS): ICD-10-CM

## 2021-08-02 DIAGNOSIS — M1A.9XX0 CHRONIC GOUT WITHOUT TOPHUS, UNSPECIFIED CAUSE, UNSPECIFIED SITE: ICD-10-CM

## 2021-08-02 DIAGNOSIS — E78.2 MIXED HYPERLIPIDEMIA: ICD-10-CM

## 2021-08-02 DIAGNOSIS — N18.31 TYPE 2 DIABETES MELLITUS WITH STAGE 3A CHRONIC KIDNEY DISEASE, WITHOUT LONG-TERM CURRENT USE OF INSULIN (HCC): Primary | ICD-10-CM

## 2021-08-02 PROCEDURE — 99214 OFFICE O/P EST MOD 30 MIN: CPT | Performed by: FAMILY MEDICINE

## 2021-08-02 NOTE — ASSESSMENT & PLAN NOTE
Lab Results   Component Value Date    EGFR 46 06/29/2021    EGFR 52 03/15/2021    EGFR 47 03/06/2021    CREATININE 1 55 (H) 06/29/2021    CREATININE 1 41 (H) 03/15/2021    CREATININE 1 52 (H) 03/06/2021

## 2021-08-02 NOTE — ASSESSMENT & PLAN NOTE
Lab Results   Component Value Date    HGBA1C 6 5 (H) 06/29/2021     Lab Results   Component Value Date    CREATININE 1 55 (H) 06/29/2021   Continue same treatment, his baseline creatinine is 1 5

## 2021-08-02 NOTE — PROGRESS NOTES
Assessment/Plan:    Problem List Items Addressed This Visit        Endocrine    Type 2 diabetes mellitus with stage 3 chronic kidney disease, without long-term current use of insulin ([facility] [address] ) - Primary       Lab Results   Component Value Date    HGBA1C 6 5 (H) 06/29/2021     Lab Results   Component Value Date    CREATININE 1 55 (H) 06/29/2021   Continue same treatment, his baseline creatinine is 1 5           Relevant Orders    CBC and differential    Comprehensive metabolic panel    Lipid panel    Hemoglobin A1C    TSH, 3rd generation       Other    Gout    Relevant Orders    Uric acid    Hyperlipidemia    Relevant Orders    Lipid panel      Other Visit Diagnoses     Screening for HIV (human immunodeficiency virus)        Relevant Orders    HIV 1/2 Antigen/Antibody (4th Generation) w Reflex [facility]        BMI Counseling: Body mass index is 42 12 kg/m²  The BMI is above normal  Nutrition recommendations include decreasing portion sizes, encouraging healthy choices of fruits and vegetables, limiting drinks that contain sugar, moderation in carbohydrate intake and reducing intake of cholesterol  Exercise recommendations include exercising 3-5 times per week  Chief Complaint   Patient presents with    Follow-up       Subjective:   Patient ID: Sarah Benedict is a 72 y o  male  Diabetes  He presents for his follow-up diabetic visit  He has type 2 diabetes mellitus  His disease course has been stable  There are no hypoglycemic associated symptoms  Pertinent negatives for hypoglycemia include no dizziness, headaches, nervousness/anxiousness or pallor  There are no diabetic associated symptoms  Pertinent negatives for diabetes include no chest pain, no fatigue, no polyuria and no weakness  Symptoms are stable  Diabetic complications include nephropathy  Risk factors for coronary artery disease include diabetes mellitus, dyslipidemia, male sex and obesity   Current diabetic treatment includes oral agent (monotherapy)  He is compliant with treatment all of the time  He is following a diabetic diet  He has not had a previous visit with a dietitian  There is no change in his home blood glucose trend  An ACE inhibitor/angiotensin II receptor blocker is being taken  Eye exam is current  hyperlipidemia, tries to eat healthy diet, and he has history of kidney stones and follows nephrologist and urologist, he has chronic renal insufficiency baseline creatinine 1 5, he tries to drink lot of fluids  Review of Systems   Constitutional: Negative for activity change, appetite change, chills, fatigue, fever and unexpected weight change  HENT: Negative for congestion, ear discharge, ear pain, nosebleeds, postnasal drip, rhinorrhea, sinus pressure, sneezing, sore throat, trouble swallowing and voice change  Eyes: Negative for photophobia, pain, discharge, redness and itching  Respiratory: Negative for cough, chest tightness, shortness of breath and wheezing  Cardiovascular: Negative for chest pain, palpitations and leg swelling  Gastrointestinal: Negative for abdominal pain, constipation, diarrhea, nausea and vomiting  Endocrine: Negative for polyuria  Genitourinary: Negative for dysuria, frequency and urgency  Musculoskeletal: Negative for arthralgias, back pain, myalgias and neck pain  Skin: Negative for color change, pallor and rash  Allergic/Immunologic: Negative for environmental allergies and food allergies  Neurological: Negative for dizziness, weakness, light-headedness and headaches  Hematological: Negative for adenopathy  Does not bruise/bleed easily  Psychiatric/Behavioral: Negative for behavioral problems  The patient is not nervous/anxious  Objective:  Physical Exam  Vitals and nursing note reviewed  Constitutional:       Appearance: He is well-developed  HENT:      Head: Normocephalic and atraumatic  Eyes:      General: No scleral icterus          Right eye: No discharge  Left eye: No discharge  Conjunctiva/sclera: Conjunctivae normal       Pupils: Pupils are equal, round, and reactive to light  Neck:      Thyroid: No thyromegaly  Trachea: No tracheal deviation  Cardiovascular:      Rate and Rhythm: Normal rate and regular rhythm  Heart sounds: Normal heart sounds  No murmur heard  Pulmonary:      Effort: Pulmonary effort is normal  No respiratory distress  Breath sounds: Normal breath sounds  No wheezing or rales  Abdominal:      General: Bowel sounds are normal  There is no distension  Palpations: Abdomen is soft  There is no mass  Tenderness: There is no abdominal tenderness  There is no rebound  Musculoskeletal:         General: Normal range of motion  Cervical back: Normal range of motion and neck supple  Right lower leg: Edema present  Left lower leg: No edema  Lymphadenopathy:      Cervical: No cervical adenopathy  Skin:     General: Skin is warm  Coloration: Skin is not pale  Findings: No erythema or rash  Neurological:      Mental Status: He is alert and oriented to person, place, and time  Cranial Nerves: No cranial nerve deficit  Deep Tendon Reflexes: Reflexes are normal and symmetric  Psychiatric:         Behavior: Behavior normal          Thought Content: Thought content normal          Judgment: Judgment normal             Past Surgical History:   Procedure Laterality Date    CARPAL TUNNEL RELEASE Bilateral     Last Assessed:9/26/2014    CYSTOSCOPY      EYE SURGERY  09/04/2019    Detached retina    HERNIA REPAIR      Femoral Hernia Repair Unilateral    TX COLONOSCOPY FLX DX W/COLLJ SPEC WHEN PFRMD N/A 12/13/2018    Procedure: COLONOSCOPY;  Surgeon: Pema Singer MD;  [facility];   Service: Gastroenterology    TX CYSTO/URETERO W/LITHOTRIPSY &INDWELL STENT INSRT Left 8/11/2016    Procedure: CYSTOSCOPY URETEROSCOPY,RETROGRADE PYELOGRAM AND INSERTION STENT URETERAL;  Surgeon: Laurel Villalobos MD;  [facility];  Service: Urology    [address] EVANS Right 11/17/2016    Procedure: ESWL;  Surgeon: Laurel Villalobos MD;  [facility];  Service: Urology       Family History   Problem Relation Age of Onset    Coronary artery disease Mother     Heart disease Father     Lung cancer Father     Cancer Father     Stomach cancer Brother          Current Outpatient Medications:     allopurinol (ZYLOPRIM) 100 mg tablet, Take 1 tablet (100 mg total) by mouth daily, Disp: 90 tablet, Rfl: 1    CINNAMON PO, Take 1,000 mg by mouth daily, Disp: , Rfl:     lisinopril (ZESTRIL) 10 mg tablet, Take 1 tablet (10 mg total) by mouth daily, Disp: 90 tablet, Rfl: 1    metFORMIN (GLUCOPHAGE) 850 mg tablet, TAKE 1 TABLET (850 MG TOTAL) BY MOUTH 2 (TWO) TIMES A DAY WITH MEALS, Disp: 180 tablet, Rfl: 1    Multiple Vitamin (multivitamin) capsule, Take 1 capsule by mouth daily, Disp: , Rfl:     Allergies   Allergen Reactions    Penicillins      Unknown as child       Vitals:    08/02/21 0817   BP: 130/70   Pulse: 73   Resp: 16   SpO2: 98%   Weight: 126 kg (277 lb)   Height: 5' 8" (1 727 m)

## 2021-10-09 DIAGNOSIS — E78.5 HYPERLIPIDEMIA, UNSPECIFIED HYPERLIPIDEMIA TYPE: ICD-10-CM

## 2021-11-02 ENCOUNTER — APPOINTMENT (OUTPATIENT)
Dept: LAB | Facility: CLINIC | Age: 66
End: 2021-11-02
Payer: MEDICARE

## 2021-11-02 DIAGNOSIS — E11.22 TYPE 2 DIABETES MELLITUS WITH STAGE 3A CHRONIC KIDNEY DISEASE, WITHOUT LONG-TERM CURRENT USE OF INSULIN (HCC): ICD-10-CM

## 2021-11-02 DIAGNOSIS — M1A.9XX0 CHRONIC GOUT WITHOUT TOPHUS, UNSPECIFIED CAUSE, UNSPECIFIED SITE: ICD-10-CM

## 2021-11-02 DIAGNOSIS — N18.31 TYPE 2 DIABETES MELLITUS WITH STAGE 3A CHRONIC KIDNEY DISEASE, WITHOUT LONG-TERM CURRENT USE OF INSULIN (HCC): ICD-10-CM

## 2021-11-02 DIAGNOSIS — E78.2 MIXED HYPERLIPIDEMIA: ICD-10-CM

## 2021-11-02 DIAGNOSIS — Z11.4 SCREENING FOR HIV (HUMAN IMMUNODEFICIENCY VIRUS): ICD-10-CM

## 2021-11-02 LAB
ALBUMIN SERPL BCP-MCNC: 3.4 G/DL (ref 3.5–5)
ALP SERPL-CCNC: 85 U/L (ref 46–116)
ALT SERPL W P-5'-P-CCNC: 40 U/L (ref 12–78)
ANION GAP SERPL CALCULATED.3IONS-SCNC: 10 MMOL/L (ref 4–13)
AST SERPL W P-5'-P-CCNC: 23 U/L (ref 5–45)
BASOPHILS # BLD AUTO: 0.05 THOUSANDS/ΜL (ref 0–0.1)
BASOPHILS NFR BLD AUTO: 1 % (ref 0–1)
BILIRUB SERPL-MCNC: 0.47 MG/DL (ref 0.2–1)
BUN SERPL-MCNC: 25 MG/DL (ref 5–25)
CALCIUM ALBUM COR SERPL-MCNC: 9.4 MG/DL (ref 8.3–10.1)
CALCIUM SERPL-MCNC: 8.9 MG/DL (ref 8.3–10.1)
CHLORIDE SERPL-SCNC: 102 MMOL/L (ref 100–108)
CHOLEST SERPL-MCNC: 150 MG/DL (ref 50–200)
CO2 SERPL-SCNC: 27 MMOL/L (ref 21–32)
CREAT SERPL-MCNC: 1.38 MG/DL (ref 0.6–1.3)
EOSINOPHIL # BLD AUTO: 0.83 THOUSAND/ΜL (ref 0–0.61)
EOSINOPHIL NFR BLD AUTO: 9 % (ref 0–6)
ERYTHROCYTE [DISTWIDTH] IN BLOOD BY AUTOMATED COUNT: 13.7 % (ref 11.6–15.1)
EST. AVERAGE GLUCOSE BLD GHB EST-MCNC: 151 MG/DL
GFR SERPL CREATININE-BSD FRML MDRD: 53 ML/MIN/1.73SQ M
GLUCOSE P FAST SERPL-MCNC: 159 MG/DL (ref 65–99)
HBA1C MFR BLD: 6.9 %
HCT VFR BLD AUTO: 43.3 % (ref 36.5–49.3)
HDLC SERPL-MCNC: 38 MG/DL
HGB BLD-MCNC: 13.2 G/DL (ref 12–17)
IMM GRANULOCYTES # BLD AUTO: 0.05 THOUSAND/UL (ref 0–0.2)
IMM GRANULOCYTES NFR BLD AUTO: 1 % (ref 0–2)
LDLC SERPL CALC-MCNC: 68 MG/DL (ref 0–100)
LYMPHOCYTES # BLD AUTO: 2.33 THOUSANDS/ΜL (ref 0.6–4.47)
LYMPHOCYTES NFR BLD AUTO: 24 % (ref 14–44)
MCH RBC QN AUTO: 28.6 PG (ref 26.8–34.3)
MCHC RBC AUTO-ENTMCNC: 30.5 G/DL (ref 31.4–37.4)
MCV RBC AUTO: 94 FL (ref 82–98)
MONOCYTES # BLD AUTO: 0.62 THOUSAND/ΜL (ref 0.17–1.22)
MONOCYTES NFR BLD AUTO: 6 % (ref 4–12)
NEUTROPHILS # BLD AUTO: 5.9 THOUSANDS/ΜL (ref 1.85–7.62)
NEUTS SEG NFR BLD AUTO: 59 % (ref 43–75)
NONHDLC SERPL-MCNC: 112 MG/DL
NRBC BLD AUTO-RTO: 0 /100 WBCS
PLATELET # BLD AUTO: 188 THOUSANDS/UL (ref 149–390)
PMV BLD AUTO: 11.5 FL (ref 8.9–12.7)
POTASSIUM SERPL-SCNC: 4.9 MMOL/L (ref 3.5–5.3)
PROT SERPL-MCNC: 6.9 G/DL (ref 6.4–8.2)
RBC # BLD AUTO: 4.61 MILLION/UL (ref 3.88–5.62)
SODIUM SERPL-SCNC: 139 MMOL/L (ref 136–145)
TRIGL SERPL-MCNC: 220 MG/DL
TSH SERPL DL<=0.05 MIU/L-ACNC: 2.8 UIU/ML (ref 0.36–3.74)
URATE SERPL-MCNC: 6.3 MG/DL (ref 4.2–8)
WBC # BLD AUTO: 9.78 THOUSAND/UL (ref 4.31–10.16)

## 2021-11-02 PROCEDURE — 85025 COMPLETE CBC W/AUTO DIFF WBC: CPT

## 2021-11-02 PROCEDURE — 84550 ASSAY OF BLOOD/URIC ACID: CPT

## 2021-11-02 PROCEDURE — 80053 COMPREHEN METABOLIC PANEL: CPT

## 2021-11-02 PROCEDURE — 80061 LIPID PANEL: CPT

## 2021-11-02 PROCEDURE — 84443 ASSAY THYROID STIM HORMONE: CPT

## 2021-11-02 PROCEDURE — 83036 HEMOGLOBIN GLYCOSYLATED A1C: CPT

## 2021-11-02 PROCEDURE — 87389 HIV-1 AG W/HIV-1&-2 AB AG IA: CPT

## 2021-11-02 PROCEDURE — 36415 COLL VENOUS BLD VENIPUNCTURE: CPT

## 2021-11-03 LAB — HIV 1+2 AB+HIV1 P24 AG SERPL QL IA: NORMAL

## 2021-12-02 ENCOUNTER — OFFICE VISIT (OUTPATIENT)
Dept: FAMILY MEDICINE CLINIC | Facility: CLINIC | Age: 66
End: 2021-12-02
Payer: MEDICARE

## 2021-12-02 VITALS
DIASTOLIC BLOOD PRESSURE: 68 MMHG | WEIGHT: 280 LBS | SYSTOLIC BLOOD PRESSURE: 128 MMHG | HEIGHT: 68 IN | OXYGEN SATURATION: 98 % | BODY MASS INDEX: 42.44 KG/M2 | RESPIRATION RATE: 16 BRPM | HEART RATE: 72 BPM

## 2021-12-02 DIAGNOSIS — I10 BENIGN ESSENTIAL HYPERTENSION: ICD-10-CM

## 2021-12-02 DIAGNOSIS — L84 CALLUS: ICD-10-CM

## 2021-12-02 DIAGNOSIS — E78.2 MIXED HYPERLIPIDEMIA: ICD-10-CM

## 2021-12-02 DIAGNOSIS — N18.31 TYPE 2 DIABETES MELLITUS WITH STAGE 3A CHRONIC KIDNEY DISEASE, WITHOUT LONG-TERM CURRENT USE OF INSULIN (HCC): ICD-10-CM

## 2021-12-02 DIAGNOSIS — N18.31 STAGE 3A CHRONIC KIDNEY DISEASE (HCC): ICD-10-CM

## 2021-12-02 DIAGNOSIS — Z00.00 MEDICARE ANNUAL WELLNESS VISIT, SUBSEQUENT: Primary | ICD-10-CM

## 2021-12-02 DIAGNOSIS — E11.22 TYPE 2 DIABETES MELLITUS WITH STAGE 3A CHRONIC KIDNEY DISEASE, WITHOUT LONG-TERM CURRENT USE OF INSULIN (HCC): ICD-10-CM

## 2021-12-02 DIAGNOSIS — Z23 NEEDS FLU SHOT: ICD-10-CM

## 2021-12-02 DIAGNOSIS — E66.01 MORBID OBESITY WITH BMI OF 40.0-44.9, ADULT (HCC): ICD-10-CM

## 2021-12-02 PROCEDURE — 1123F ACP DISCUSS/DSCN MKR DOCD: CPT | Performed by: FAMILY MEDICINE

## 2021-12-02 PROCEDURE — 90662 IIV NO PRSV INCREASED AG IM: CPT | Performed by: FAMILY MEDICINE

## 2021-12-02 PROCEDURE — G0439 PPPS, SUBSEQ VISIT: HCPCS | Performed by: FAMILY MEDICINE

## 2021-12-02 PROCEDURE — 99214 OFFICE O/P EST MOD 30 MIN: CPT | Performed by: FAMILY MEDICINE

## 2021-12-02 PROCEDURE — G0008 ADMIN INFLUENZA VIRUS VAC: HCPCS | Performed by: FAMILY MEDICINE

## 2021-12-02 RX ORDER — GLIMEPIRIDE 2 MG/1
2 TABLET ORAL
Qty: 90 TABLET | Refills: 1 | Status: SHIPPED | OUTPATIENT
Start: 2021-12-02 | End: 2022-05-25

## 2021-12-02 RX ORDER — SIMVASTATIN 10 MG
10 TABLET ORAL
Qty: 90 TABLET | Refills: 3 | Status: SHIPPED | OUTPATIENT
Start: 2021-12-02

## 2021-12-13 DIAGNOSIS — Z03.818 ENCOUNTER FOR OBSERVATION FOR SUSPECTED EXPOSURE TO OTHER BIOLOGICAL AGENTS RULED OUT: Primary | ICD-10-CM

## 2021-12-13 PROCEDURE — U0005 INFEC AGEN DETEC AMPLI PROBE: HCPCS | Performed by: FAMILY MEDICINE

## 2021-12-13 PROCEDURE — U0003 INFECTIOUS AGENT DETECTION BY NUCLEIC ACID (DNA OR RNA); SEVERE ACUTE RESPIRATORY SYNDROME CORONAVIRUS 2 (SARS-COV-2) (CORONAVIRUS DISEASE [COVID-19]), AMPLIFIED PROBE TECHNIQUE, MAKING USE OF HIGH THROUGHPUT TECHNOLOGIES AS DESCRIBED BY CMS-2020-01-R: HCPCS | Performed by: FAMILY MEDICINE

## 2021-12-14 ENCOUNTER — TELEPHONE (OUTPATIENT)
Dept: FAMILY MEDICINE CLINIC | Facility: CLINIC | Age: 66
End: 2021-12-14

## 2022-01-10 DIAGNOSIS — I10 BENIGN ESSENTIAL HYPERTENSION: ICD-10-CM

## 2022-01-10 DIAGNOSIS — N20.0 NEPHROLITHIASIS: ICD-10-CM

## 2022-01-10 RX ORDER — ALLOPURINOL 100 MG/1
100 TABLET ORAL DAILY
Qty: 90 TABLET | Refills: 1 | Status: SHIPPED | OUTPATIENT
Start: 2022-01-10 | End: 2022-07-11 | Stop reason: SDUPTHER

## 2022-01-10 RX ORDER — LISINOPRIL 10 MG/1
10 TABLET ORAL DAILY
Qty: 90 TABLET | Refills: 1 | Status: SHIPPED | OUTPATIENT
Start: 2022-01-10 | End: 2022-07-11 | Stop reason: SDUPTHER

## 2022-02-03 NOTE — TELEPHONE ENCOUNTER
Patient called because he would like to cancel his appointment with Dr Ruben Theodore on 3/30/20 as long as his recent blood work that was ordered is normal? He said that he was never notified of the results and to call him and if normal he doesn't feel the need for a visit  I did not cancel the appointment as of yet until we contact the patient with the blood work results  No

## 2022-03-23 ENCOUNTER — APPOINTMENT (OUTPATIENT)
Dept: LAB | Facility: CLINIC | Age: 67
End: 2022-03-23
Payer: MEDICARE

## 2022-03-23 DIAGNOSIS — E78.2 MIXED HYPERLIPIDEMIA: ICD-10-CM

## 2022-03-23 DIAGNOSIS — N18.31 TYPE 2 DIABETES MELLITUS WITH STAGE 3A CHRONIC KIDNEY DISEASE, WITHOUT LONG-TERM CURRENT USE OF INSULIN (HCC): ICD-10-CM

## 2022-03-23 DIAGNOSIS — E11.22 TYPE 2 DIABETES MELLITUS WITH STAGE 3A CHRONIC KIDNEY DISEASE, WITHOUT LONG-TERM CURRENT USE OF INSULIN (HCC): ICD-10-CM

## 2022-03-23 DIAGNOSIS — N18.31 STAGE 3A CHRONIC KIDNEY DISEASE (HCC): ICD-10-CM

## 2022-03-23 LAB
ALBUMIN SERPL BCP-MCNC: 3.7 G/DL (ref 3.5–5)
ALP SERPL-CCNC: 82 U/L (ref 46–116)
ALT SERPL W P-5'-P-CCNC: 47 U/L (ref 12–78)
ANION GAP SERPL CALCULATED.3IONS-SCNC: 4 MMOL/L (ref 4–13)
AST SERPL W P-5'-P-CCNC: 31 U/L (ref 5–45)
BASOPHILS # BLD AUTO: 0.06 THOUSANDS/ΜL (ref 0–0.1)
BASOPHILS NFR BLD AUTO: 1 % (ref 0–1)
BILIRUB SERPL-MCNC: 0.57 MG/DL (ref 0.2–1)
BUN SERPL-MCNC: 23 MG/DL (ref 5–25)
CALCIUM SERPL-MCNC: 10 MG/DL (ref 8.3–10.1)
CHLORIDE SERPL-SCNC: 107 MMOL/L (ref 100–108)
CHOLEST SERPL-MCNC: 125 MG/DL
CO2 SERPL-SCNC: 27 MMOL/L (ref 21–32)
CREAT SERPL-MCNC: 1.35 MG/DL (ref 0.6–1.3)
CREAT UR-MCNC: 86.8 MG/DL
EOSINOPHIL # BLD AUTO: 0.21 THOUSAND/ΜL (ref 0–0.61)
EOSINOPHIL NFR BLD AUTO: 2 % (ref 0–6)
ERYTHROCYTE [DISTWIDTH] IN BLOOD BY AUTOMATED COUNT: 14.2 % (ref 11.6–15.1)
EST. AVERAGE GLUCOSE BLD GHB EST-MCNC: 140 MG/DL
GFR SERPL CREATININE-BSD FRML MDRD: 54 ML/MIN/1.73SQ M
GLUCOSE P FAST SERPL-MCNC: 140 MG/DL (ref 65–99)
HBA1C MFR BLD: 6.5 %
HCT VFR BLD AUTO: 46.4 % (ref 36.5–49.3)
HDLC SERPL-MCNC: 36 MG/DL
HGB BLD-MCNC: 14.5 G/DL (ref 12–17)
IMM GRANULOCYTES # BLD AUTO: 0.04 THOUSAND/UL (ref 0–0.2)
IMM GRANULOCYTES NFR BLD AUTO: 0 % (ref 0–2)
LDLC SERPL CALC-MCNC: 52 MG/DL (ref 0–100)
LYMPHOCYTES # BLD AUTO: 2.72 THOUSANDS/ΜL (ref 0.6–4.47)
LYMPHOCYTES NFR BLD AUTO: 28 % (ref 14–44)
MCH RBC QN AUTO: 29.2 PG (ref 26.8–34.3)
MCHC RBC AUTO-ENTMCNC: 31.3 G/DL (ref 31.4–37.4)
MCV RBC AUTO: 94 FL (ref 82–98)
MICROALBUMIN UR-MCNC: 12.4 MG/L (ref 0–20)
MICROALBUMIN/CREAT 24H UR: 14 MG/G CREATININE (ref 0–30)
MONOCYTES # BLD AUTO: 0.64 THOUSAND/ΜL (ref 0.17–1.22)
MONOCYTES NFR BLD AUTO: 7 % (ref 4–12)
NEUTROPHILS # BLD AUTO: 6.12 THOUSANDS/ΜL (ref 1.85–7.62)
NEUTS SEG NFR BLD AUTO: 62 % (ref 43–75)
NONHDLC SERPL-MCNC: 89 MG/DL
NRBC BLD AUTO-RTO: 0 /100 WBCS
PLATELET # BLD AUTO: 195 THOUSANDS/UL (ref 149–390)
PMV BLD AUTO: 11.5 FL (ref 8.9–12.7)
POTASSIUM SERPL-SCNC: 4.7 MMOL/L (ref 3.5–5.3)
PROT SERPL-MCNC: 7.4 G/DL (ref 6.4–8.2)
RBC # BLD AUTO: 4.96 MILLION/UL (ref 3.88–5.62)
SODIUM SERPL-SCNC: 138 MMOL/L (ref 136–145)
TRIGL SERPL-MCNC: 184 MG/DL
WBC # BLD AUTO: 9.79 THOUSAND/UL (ref 4.31–10.16)

## 2022-03-23 PROCEDURE — 80061 LIPID PANEL: CPT

## 2022-03-23 PROCEDURE — 80053 COMPREHEN METABOLIC PANEL: CPT

## 2022-03-23 PROCEDURE — 82043 UR ALBUMIN QUANTITATIVE: CPT | Performed by: FAMILY MEDICINE

## 2022-03-23 PROCEDURE — 85025 COMPLETE CBC W/AUTO DIFF WBC: CPT

## 2022-03-23 PROCEDURE — 36415 COLL VENOUS BLD VENIPUNCTURE: CPT

## 2022-03-23 PROCEDURE — 83036 HEMOGLOBIN GLYCOSYLATED A1C: CPT

## 2022-03-23 PROCEDURE — 82570 ASSAY OF URINE CREATININE: CPT | Performed by: FAMILY MEDICINE

## 2022-04-05 ENCOUNTER — OFFICE VISIT (OUTPATIENT)
Dept: FAMILY MEDICINE CLINIC | Facility: CLINIC | Age: 67
End: 2022-04-05
Payer: MEDICARE

## 2022-04-05 VITALS
BODY MASS INDEX: 43.5 KG/M2 | RESPIRATION RATE: 16 BRPM | HEART RATE: 64 BPM | WEIGHT: 287 LBS | HEIGHT: 68 IN | DIASTOLIC BLOOD PRESSURE: 70 MMHG | OXYGEN SATURATION: 97 % | SYSTOLIC BLOOD PRESSURE: 124 MMHG

## 2022-04-05 DIAGNOSIS — I10 BENIGN ESSENTIAL HYPERTENSION: Primary | ICD-10-CM

## 2022-04-05 DIAGNOSIS — E66.01 MORBID OBESITY WITH BMI OF 40.0-44.9, ADULT (HCC): ICD-10-CM

## 2022-04-05 DIAGNOSIS — N18.31 TYPE 2 DIABETES MELLITUS WITH STAGE 3A CHRONIC KIDNEY DISEASE, WITHOUT LONG-TERM CURRENT USE OF INSULIN (HCC): ICD-10-CM

## 2022-04-05 DIAGNOSIS — E11.22 TYPE 2 DIABETES MELLITUS WITH STAGE 3A CHRONIC KIDNEY DISEASE, WITHOUT LONG-TERM CURRENT USE OF INSULIN (HCC): ICD-10-CM

## 2022-04-05 DIAGNOSIS — M1A.9XX0 CHRONIC GOUT WITHOUT TOPHUS, UNSPECIFIED CAUSE, UNSPECIFIED SITE: ICD-10-CM

## 2022-04-05 PROCEDURE — 99214 OFFICE O/P EST MOD 30 MIN: CPT | Performed by: FAMILY MEDICINE

## 2022-04-05 NOTE — PROGRESS NOTES
Assessment/Plan:    Problem List Items Addressed This Visit        Endocrine    Type 2 diabetes mellitus with stage 3 chronic kidney disease, without long-term current use of insulin ([facility] [address] )       Lab Results   Component Value Date    HGBA1C 6 5 (H) 03/23/2022   improved , cont same tx          Relevant Orders    CBC and differential    Comprehensive metabolic panel    Lipid panel    Hemoglobin A1C    TSH, 3rd generation       Cardiovascular and Mediastinum    Benign essential hypertension - Primary     Stable , cont same tx          Relevant Orders    Comprehensive metabolic panel       Other    Gout    Relevant Orders    CBC and differential    Uric acid    Morbid obesity with BMI of 40 0-44 9, adult ([facility] [address] )     He intends to start exercise                Return in about 4 months (around 8/5/2022) for Recheck  Chief Complaint   Patient presents with    Follow-up       Subjective:   Patient ID: Dilip Armenta is a 77 y o  male  Diabetes  He presents for his follow-up diabetic visit  He has type 2 diabetes mellitus  His disease course has been improving  There are no hypoglycemic associated symptoms  Pertinent negatives for diabetes include no blurred vision, no chest pain, no fatigue, no foot paresthesias, no polydipsia, no polyphagia, no polyuria, no visual change and no weight loss  There are no hypoglycemic complications  Symptoms are improving  Diabetic complications include nephropathy  Risk factors for coronary artery disease include diabetes mellitus, dyslipidemia, male sex, obesity and hypertension  Current diabetic treatment includes oral agent (dual therapy)  He is compliant with treatment all of the time  His weight is increasing steadily  He is following a diabetic diet  He participates in exercise intermittently  His home blood glucose trend is decreasing steadily  An ACE inhibitor/angiotensin II receptor blocker is being taken  He sees a podiatrist Eye exam is current     Hyperlipidemia  This is a chronic problem  The problem is controlled  Recent lipid tests were reviewed and are normal  Exacerbating diseases include chronic renal disease  Pertinent negatives include no chest pain, focal sensory loss, focal weakness, leg pain, myalgias or shortness of breath  Current antihyperlipidemic treatment includes statins  The current treatment provides moderate improvement of lipids  There are no compliance problems  Review of Systems   Constitutional: Negative for fatigue and weight loss  Eyes: Negative  Negative for blurred vision  Respiratory: Negative  Negative for shortness of breath  Cardiovascular: Negative for chest pain  Endocrine: Negative for polydipsia, polyphagia and polyuria  Musculoskeletal: Negative for myalgias  Neurological: Negative for focal weakness  Hematological: Negative  Psychiatric/Behavioral: Negative  Objective:  Physical Exam  Vitals and nursing note reviewed  Constitutional:       Appearance: He is well-developed  HENT:      Head: Normocephalic and atraumatic  Mouth/Throat:      Mouth: Mucous membranes are moist       Pharynx: No oropharyngeal exudate  Eyes:      General: No scleral icterus  Right eye: No discharge  Left eye: No discharge  Conjunctiva/sclera: Conjunctivae normal       Pupils: Pupils are equal, round, and reactive to light  Neck:      Thyroid: No thyromegaly  Trachea: No tracheal deviation  Cardiovascular:      Rate and Rhythm: Normal rate and regular rhythm  Heart sounds: Normal heart sounds  No murmur heard  Pulmonary:      Effort: Pulmonary effort is normal  No respiratory distress  Breath sounds: Normal breath sounds  No wheezing or rales  Musculoskeletal:         General: Normal range of motion  Cervical back: Normal range of motion and neck supple  Lymphadenopathy:      Cervical: No cervical adenopathy  Skin:     General: Skin is warm        Coloration: Skin is not pale       Findings: No erythema or rash  Neurological:      General: No focal deficit present  Mental Status: He is alert and oriented to person, place, and time  Cranial Nerves: No cranial nerve deficit  Deep Tendon Reflexes: Reflexes are normal and symmetric  Psychiatric:         Behavior: Behavior normal          Thought Content: Thought content normal          Judgment: Judgment normal             Past Surgical History:   Procedure Laterality Date    CARPAL TUNNEL RELEASE Bilateral     Last Assessed:9/26/2014    CYSTOSCOPY      EYE SURGERY  09/04/2019    Detached retina    HERNIA REPAIR      Femoral Hernia Repair Unilateral    NH COLONOSCOPY FLX DX W/COLLJ SPEC WHEN PFRMD N/A 12/13/2018    Procedure: COLONOSCOPY;  Surgeon: Mary Calero MD;  [facility];   Service: Gastroenterology    NH CYSTO/URETERO W/LITHOTRIPSY &INDWELL STENT INSRT Left 8/11/2016    Procedure: CYSTOSCOPY URETEROSCOPY,RETROGRADE PYELOGRAM AND INSERTION STENT URETERAL;  Surgeon: Blaise Harris MD;  [facility];  Service: Urology    NH FRAGMENT KIDNEY STONE/ ESWL Right 11/17/2016    Procedure: ESWL;  Surgeon: Blaise Harris MD;  [facility];  Service: Urology       Family History   Problem Relation Age of Onset    Coronary artery disease Mother     Heart disease Father     Lung cancer Father     Cancer Father     Stomach cancer Brother          Current Outpatient Medications:     allopurinol (ZYLOPRIM) 100 mg tablet, Take 1 tablet (100 mg total) by mouth daily, Disp: 90 tablet, Rfl: 1    CINNAMON PO, Take 1,000 mg by mouth daily, Disp: , Rfl:     glimepiride (AMARYL) 2 mg tablet, Take 1 tablet (2 mg total) by mouth daily with breakfast, Disp: 90 tablet, Rfl: 1    lisinopril (ZESTRIL) 10 mg tablet, Take 1 tablet (10 mg total) by mouth daily, Disp: 90 tablet, Rfl: 1    metFORMIN (GLUCOPHAGE) 850 mg tablet, TAKE 1 TABLET (850 MG TOTAL) BY MOUTH 2 (TWO) TIMES A DAY WITH MEALS, Disp: 180 tablet, Rfl: 1    Multiple Vitamin (multivitamin) capsule, Take 1 capsule by mouth daily, Disp: , Rfl:     simvastatin (ZOCOR) 10 mg tablet, Take 1 tablet (10 mg total) by mouth daily at bedtime, Disp: 90 tablet, Rfl: 3    Allergies   Allergen Reactions    Penicillins      Unknown as child       Vitals:    04/05/22 0805   BP: 124/70   Pulse: 64   Resp: 16   SpO2: 97%   Weight: 130 kg (287 lb)   Height: 5' 8" (1 727 m)

## 2022-04-07 DIAGNOSIS — E78.5 HYPERLIPIDEMIA, UNSPECIFIED HYPERLIPIDEMIA TYPE: ICD-10-CM

## 2022-05-25 DIAGNOSIS — E11.22 TYPE 2 DIABETES MELLITUS WITH STAGE 3A CHRONIC KIDNEY DISEASE, WITHOUT LONG-TERM CURRENT USE OF INSULIN (HCC): ICD-10-CM

## 2022-05-25 DIAGNOSIS — N18.31 TYPE 2 DIABETES MELLITUS WITH STAGE 3A CHRONIC KIDNEY DISEASE, WITHOUT LONG-TERM CURRENT USE OF INSULIN (HCC): ICD-10-CM

## 2022-05-25 RX ORDER — GLIMEPIRIDE 2 MG/1
TABLET ORAL
Qty: 90 TABLET | Refills: 1 | Status: SHIPPED | OUTPATIENT
Start: 2022-05-25

## 2022-07-01 ENCOUNTER — RA CDI HCC (OUTPATIENT)
Dept: OTHER | Facility: HOSPITAL | Age: 67
End: 2022-07-01

## 2022-07-01 NOTE — PROGRESS NOTES
[facility] [address]  coding opportunities       Chart reviewed, no opportunity found: CHART REVIEWED, NO OPPORTUNITY FOUND        Patients Insurance     Medicare Insurance: Medicare

## 2022-07-05 ENCOUNTER — APPOINTMENT (OUTPATIENT)
Dept: LAB | Facility: CLINIC | Age: 67
End: 2022-07-05
Payer: MEDICARE

## 2022-07-05 DIAGNOSIS — E11.22 TYPE 2 DIABETES MELLITUS WITH STAGE 3A CHRONIC KIDNEY DISEASE, WITHOUT LONG-TERM CURRENT USE OF INSULIN (HCC): ICD-10-CM

## 2022-07-05 DIAGNOSIS — N18.31 TYPE 2 DIABETES MELLITUS WITH STAGE 3A CHRONIC KIDNEY DISEASE, WITHOUT LONG-TERM CURRENT USE OF INSULIN (HCC): ICD-10-CM

## 2022-07-05 DIAGNOSIS — M1A.9XX0 CHRONIC GOUT WITHOUT TOPHUS, UNSPECIFIED CAUSE, UNSPECIFIED SITE: ICD-10-CM

## 2022-07-05 DIAGNOSIS — I10 BENIGN ESSENTIAL HYPERTENSION: ICD-10-CM

## 2022-07-05 DIAGNOSIS — Z12.5 SCREENING FOR PROSTATE CANCER: ICD-10-CM

## 2022-07-05 LAB
ALBUMIN SERPL BCP-MCNC: 3.6 G/DL (ref 3.5–5)
ALP SERPL-CCNC: 62 U/L (ref 34–104)
ALT SERPL W P-5'-P-CCNC: 20 U/L (ref 7–52)
ANION GAP SERPL CALCULATED.3IONS-SCNC: 5 MMOL/L (ref 4–13)
AST SERPL W P-5'-P-CCNC: 17 U/L (ref 13–39)
BASOPHILS # BLD AUTO: 0.05 THOUSANDS/ΜL (ref 0–0.1)
BASOPHILS NFR BLD AUTO: 1 % (ref 0–1)
BILIRUB SERPL-MCNC: 0.51 MG/DL (ref 0.2–1)
BUN SERPL-MCNC: 25 MG/DL (ref 5–25)
CALCIUM SERPL-MCNC: 8.6 MG/DL (ref 8.4–10.2)
CHLORIDE SERPL-SCNC: 106 MMOL/L (ref 96–108)
CHOLEST SERPL-MCNC: 82 MG/DL
CO2 SERPL-SCNC: 26 MMOL/L (ref 21–32)
CREAT SERPL-MCNC: 1.25 MG/DL (ref 0.6–1.3)
EOSINOPHIL # BLD AUTO: 0.72 THOUSAND/ΜL (ref 0–0.61)
EOSINOPHIL NFR BLD AUTO: 8 % (ref 0–6)
ERYTHROCYTE [DISTWIDTH] IN BLOOD BY AUTOMATED COUNT: 14.6 % (ref 11.6–15.1)
EST. AVERAGE GLUCOSE BLD GHB EST-MCNC: 146 MG/DL
GFR SERPL CREATININE-BSD FRML MDRD: 59 ML/MIN/1.73SQ M
GLUCOSE P FAST SERPL-MCNC: 134 MG/DL (ref 65–99)
HBA1C MFR BLD: 6.7 %
HCT VFR BLD AUTO: 40.7 % (ref 36.5–49.3)
HDLC SERPL-MCNC: 26 MG/DL
HGB BLD-MCNC: 12.9 G/DL (ref 12–17)
IMM GRANULOCYTES # BLD AUTO: 0.03 THOUSAND/UL (ref 0–0.2)
IMM GRANULOCYTES NFR BLD AUTO: 0 % (ref 0–2)
LDLC SERPL CALC-MCNC: 39 MG/DL (ref 0–100)
LYMPHOCYTES # BLD AUTO: 1.81 THOUSANDS/ΜL (ref 0.6–4.47)
LYMPHOCYTES NFR BLD AUTO: 19 % (ref 14–44)
MCH RBC QN AUTO: 29.7 PG (ref 26.8–34.3)
MCHC RBC AUTO-ENTMCNC: 31.7 G/DL (ref 31.4–37.4)
MCV RBC AUTO: 94 FL (ref 82–98)
MONOCYTES # BLD AUTO: 1 THOUSAND/ΜL (ref 0.17–1.22)
MONOCYTES NFR BLD AUTO: 10 % (ref 4–12)
NEUTROPHILS # BLD AUTO: 6.03 THOUSANDS/ΜL (ref 1.85–7.62)
NEUTS SEG NFR BLD AUTO: 62 % (ref 43–75)
NONHDLC SERPL-MCNC: 56 MG/DL
NRBC BLD AUTO-RTO: 0 /100 WBCS
PLATELET # BLD AUTO: 172 THOUSANDS/UL (ref 149–390)
PMV BLD AUTO: 11.6 FL (ref 8.9–12.7)
POTASSIUM SERPL-SCNC: 5 MMOL/L (ref 3.5–5.3)
PROT SERPL-MCNC: 6.1 G/DL (ref 6.4–8.4)
PSA SERPL-MCNC: 2.2 NG/ML (ref 0–4)
RBC # BLD AUTO: 4.35 MILLION/UL (ref 3.88–5.62)
SODIUM SERPL-SCNC: 137 MMOL/L (ref 135–147)
TRIGL SERPL-MCNC: 84 MG/DL
TSH SERPL DL<=0.05 MIU/L-ACNC: 2.13 UIU/ML (ref 0.45–4.5)
URATE SERPL-MCNC: 6.9 MG/DL (ref 3.5–8.5)
WBC # BLD AUTO: 9.64 THOUSAND/UL (ref 4.31–10.16)

## 2022-07-05 PROCEDURE — 84550 ASSAY OF BLOOD/URIC ACID: CPT

## 2022-07-05 PROCEDURE — 80061 LIPID PANEL: CPT

## 2022-07-05 PROCEDURE — 36415 COLL VENOUS BLD VENIPUNCTURE: CPT

## 2022-07-05 PROCEDURE — G0103 PSA SCREENING: HCPCS

## 2022-07-05 PROCEDURE — 85025 COMPLETE CBC W/AUTO DIFF WBC: CPT

## 2022-07-05 PROCEDURE — 80053 COMPREHEN METABOLIC PANEL: CPT

## 2022-07-05 PROCEDURE — 83036 HEMOGLOBIN GLYCOSYLATED A1C: CPT

## 2022-07-05 PROCEDURE — 84443 ASSAY THYROID STIM HORMONE: CPT

## 2022-07-06 ENCOUNTER — TELEPHONE (OUTPATIENT)
Dept: UROLOGY | Facility: CLINIC | Age: 67
End: 2022-07-06

## 2022-07-06 NOTE — TELEPHONE ENCOUNTER
----- Message from Joe Rabago PA-C sent at 7/5/2022  2:40 PM EDT -----  PSA stable and normal at 2 2

## 2022-07-06 NOTE — TELEPHONE ENCOUNTER
Left detailed VM informing patient that psa was normal   Also advised that hes overdue for yearly appt and to please call and schedule

## 2022-07-11 DIAGNOSIS — N20.0 NEPHROLITHIASIS: ICD-10-CM

## 2022-07-11 DIAGNOSIS — I10 BENIGN ESSENTIAL HYPERTENSION: ICD-10-CM

## 2022-07-11 RX ORDER — LISINOPRIL 10 MG/1
10 TABLET ORAL DAILY
Qty: 90 TABLET | Refills: 1 | Status: SHIPPED | OUTPATIENT
Start: 2022-07-11

## 2022-07-11 RX ORDER — ALLOPURINOL 100 MG/1
100 TABLET ORAL DAILY
Qty: 90 TABLET | Refills: 1 | Status: SHIPPED | OUTPATIENT
Start: 2022-07-11

## 2022-08-04 ENCOUNTER — OFFICE VISIT (OUTPATIENT)
Dept: FAMILY MEDICINE CLINIC | Facility: CLINIC | Age: 67
End: 2022-08-04
Payer: MEDICARE

## 2022-08-04 VITALS
DIASTOLIC BLOOD PRESSURE: 70 MMHG | BODY MASS INDEX: 43.19 KG/M2 | SYSTOLIC BLOOD PRESSURE: 136 MMHG | OXYGEN SATURATION: 96 % | RESPIRATION RATE: 16 BRPM | HEART RATE: 79 BPM | HEIGHT: 68 IN | WEIGHT: 285 LBS

## 2022-08-04 DIAGNOSIS — E11.22 TYPE 2 DIABETES MELLITUS WITH STAGE 3A CHRONIC KIDNEY DISEASE, WITHOUT LONG-TERM CURRENT USE OF INSULIN (HCC): ICD-10-CM

## 2022-08-04 DIAGNOSIS — I10 BENIGN ESSENTIAL HYPERTENSION: Primary | ICD-10-CM

## 2022-08-04 DIAGNOSIS — N18.31 TYPE 2 DIABETES MELLITUS WITH STAGE 3A CHRONIC KIDNEY DISEASE, WITHOUT LONG-TERM CURRENT USE OF INSULIN (HCC): ICD-10-CM

## 2022-08-04 DIAGNOSIS — E78.2 MIXED HYPERLIPIDEMIA: ICD-10-CM

## 2022-08-04 DIAGNOSIS — M1A.9XX0 CHRONIC GOUT WITHOUT TOPHUS, UNSPECIFIED CAUSE, UNSPECIFIED SITE: ICD-10-CM

## 2022-08-04 PROCEDURE — 99214 OFFICE O/P EST MOD 30 MIN: CPT | Performed by: FAMILY MEDICINE

## 2022-08-04 NOTE — PATIENT INSTRUCTIONS
10% - bad control"> 10% - bad control,Hemoglobin A1c (HbA1c) greater than 10% indicating poor diabetic control,Haemoglobin A1c greater than 10% indicating poor diabetic control">   Diabetes Mellitus Type 2 in Adults, Ambulatory Care   GENERAL INFORMATION:   Diabetes mellitus type 2  is a disease that affects how your body uses glucose (sugar)  Insulin helps move sugar out of the blood so it can be used for energy  Normally, when the blood sugar level increases, the pancreas makes more insulin  Type 2 diabetes develops because either the body cannot make enough insulin, or it cannot use the insulin correctly  After many years, your pancreas may stop making insulin  Common symptoms include the following:   · More hunger or thirst than usual     · Frequent urination     · Weight loss without trying     · Blurred vision  Seek immediate care for the following symptoms:   · Severe abdominal pain, or pain that spreads to your back  You may also be vomiting  · Trouble staying awake or focusing    · Shaking or sweating    · Blurred or double vision    · Breath has a fruity, sweet smell    · Breathing is deep and labored, or rapid and shallow    · Heartbeat is fast and weak  Treatment for diabetes mellitus type 2  includes keeping your blood sugar at a normal level  You must eat the right foods, and exercise regularly  You may also need medicine if you cannot control your blood sugar level with nutrition and exercise  Manage diabetes mellitus type 2:   · Check your blood sugar level  You will be taught how to check a small drop of blood in a glucose monitor  Ask your healthcare provider when and how often to check during the day  Ask your healthcare provider what your blood sugar levels should be when you check them  · Keep track of carbohydrates (sugar and starchy foods)  Your blood sugar level can get too high if you eat too many carbohydrates   Your dietitian will help you plan meals and snacks that have the right amount of carbohydrates  · Eat low-fat foods  Some examples are skinless chicken and low-fat milk  · Eat less sodium (salt)  Some examples of high-sodium foods to limit are soy sauce, potato chips, and soup  Do not add salt to food you cook  Limit your use of table salt  · Eat high-fiber foods  Foods that are a good source of fiber include vegetables, whole grain bread, and beans  · Limit alcohol  Alcohol affects your blood sugar level and can make it harder to manage your diabetes  Women should limit alcohol to 1 drink a day  Men should limit alcohol to 2 drinks a day  A drink of alcohol is 12 ounces of beer, 5 ounces of wine, or 1½ ounces of liquor  · Get regular exercise  Exercise can help keep your blood sugar level steady, decrease your risk of heart disease, and help you lose weight  Exercise for at least 30 minutes, 5 days a week  Include muscle strengthening activities 2 days each week  Work with your healthcare provider to create an exercise plan  · Check your feet each day  for injuries or open sores  Ask your healthcare provider for activities you can do if you have an open sore  · Quit smoking  If you smoke, it is never too late to quit  Smoking can worsen the problems that may occur with diabetes  Ask your healthcare provider for information about how to stop smoking if you are having trouble quitting  · Ask about your weight:  Ask healthcare providers if you need to lose weight, and how much to lose  Ask them to help you with a weight loss program  Even a 10 to 15 pound weight loss can help you manage your blood sugar level  · Carry medical alert identification  Wear medical alert jewelry or carry a card that says you have diabetes  Ask your healthcare provider where to get these items  · Ask about vaccines  Diabetes puts you at risk of serious illness if you get the flu, pneumonia, or hepatitis   Ask your healthcare provider if you should get a flu, pneumonia, or hepatitis B vaccine, and when to get the vaccine  Follow up with your healthcare provider as directed:  Write down your questions so you remember to ask them during your visits  CARE AGREEMENT:   You have the right to help plan your care  Learn about your health condition and how it may be treated  Discuss treatment options with your caregivers to decide what care you want to receive  You always have the right to refuse treatment  The above information is an  only  It is not intended as medical advice for individual conditions or treatments  Talk to your doctor, nurse or pharmacist before following any medical regimen to see if it is safe and effective for you  © 2014 [address] is for End User's use only and may not be sold, redistributed or otherwise used for commercial purposes  All illustrations and images included in CareNotes® are the copyrighted property of A D A M , Inc  or Anant Escobedo

## 2022-08-04 NOTE — PROGRESS NOTES
Assessment/Plan:    Problem List Items Addressed This Visit        Endocrine    Type 2 diabetes mellitus with stage 3 chronic kidney disease, without long-term current use of insulin ([facility] [place] [address] )       Lab Results   Component Value Date    HGBA1C 6 7 (H) 07/05/2022   Diabetes is well controlled, continue same medication,, advised to have regular exercise and try to lose more weight         Relevant Orders    CBC and differential    Comprehensive metabolic panel    Hemoglobin A1C    Lipid panel       Cardiovascular and Mediastinum    Benign essential hypertension - Primary     Hypertension is stable, continue low-salt diet, continue same medications  Other    Gout     Continue allopurinol and there is no recent gout attack and he is on low purine diet         Hyperlipidemia     Lab Results   Component Value Date    LDLCALC 39 07/05/2022                  Lab Results   Component Value Date    CREATININE 1 25 07/05/2022   cont drinking plenty of fluids       Return in about 4 months (around 12/4/2022) for Recheck  Chief Complaint   Patient presents with    Follow-up       Subjective:   Patient ID: Oliver Villalpando is a 77 y o  male  Follow-up on diabetes, hypertension, he follows urologist, trying to drink more fluids, as she has renal insufficiency, he has not seen the nephrologist in a long time  He says he does not walk much due to the heat, and trying to eat more vegetable,   His PSA was checked by urologist which is normal he has slightly chronic edema in his right leg which is not changing      HPI    Review of Systems   Constitutional: Negative for activity change, appetite change, chills, fatigue, fever and unexpected weight change  HENT: Negative for congestion, ear discharge, ear pain, nosebleeds, postnasal drip, rhinorrhea, sinus pressure, sneezing, sore throat, trouble swallowing and voice change  Eyes: Negative for photophobia, pain, discharge, redness and itching     Respiratory: Negative for cough, chest tightness, shortness of breath and wheezing  Cardiovascular: Negative for chest pain, palpitations and leg swelling  Gastrointestinal: Negative for abdominal pain, constipation, diarrhea, nausea and vomiting  Endocrine: Negative for polyuria  Genitourinary: Negative for dysuria, frequency and urgency  Musculoskeletal: Negative for arthralgias, back pain, myalgias and neck pain  Skin: Negative for color change, pallor and rash  Allergic/Immunologic: Negative for environmental allergies and food allergies  Neurological: Negative for dizziness, weakness, light-headedness and headaches  Hematological: Negative for adenopathy  Does not bruise/bleed easily  Psychiatric/Behavioral: Negative for behavioral problems and sleep disturbance  The patient is not nervous/anxious  Objective:  Physical Exam  Vitals and nursing note reviewed  Constitutional:       Appearance: Normal appearance  He is well-developed  He is obese  HENT:      Head: Normocephalic and atraumatic  Right Ear: External ear normal       Left Ear: External ear normal    Eyes:      Extraocular Movements: Extraocular movements intact  Neck:      Thyroid: No thyromegaly  Cardiovascular:      Rate and Rhythm: Normal rate and regular rhythm  Heart sounds: Normal heart sounds  No murmur heard  Pulmonary:      Effort: Pulmonary effort is normal       Breath sounds: Normal breath sounds  No wheezing or rales  Abdominal:      Palpations: Abdomen is soft  There is no mass  Musculoskeletal:      Cervical back: Normal range of motion and neck supple  Right lower leg: Edema present  Left lower leg: No edema  Lymphadenopathy:      Cervical: No cervical adenopathy  Skin:     Coloration: Skin is not jaundiced  Findings: No bruising, erythema, lesion or rash  Neurological:      General: No focal deficit present        Gait: Gait normal    Psychiatric:         Mood and Affect: Mood normal  Past Surgical History:   Procedure Laterality Date    CARPAL TUNNEL RELEASE Bilateral     Last Assessed:9/26/2014    CYSTOSCOPY      EYE SURGERY  09/04/2019    Detached retina    HERNIA REPAIR      Femoral Hernia Repair Unilateral    NJ COLONOSCOPY FLX DX W/COLLJ SPEC WHEN PFRMD N/A 12/13/2018    Procedure: COLONOSCOPY;  Surgeon: Prachi Castro MD;  [facility];   Service: Gastroenterology    NJ CYSTO/URETERO W/LITHOTRIPSY &INDWELL STENT INSRT Left 8/11/2016    Procedure: CYSTOSCOPY URETEROSCOPY,RETROGRADE PYELOGRAM AND INSERTION STENT URETERAL;  Surgeon: Mikel Villafana MD;  [facility];  Service: Urology    Patlyerika ESWL Right 11/17/2016    Procedure: ESWL;  Surgeon: Mikel Villafana MD;  [facility];  Service: Urology       Family History   Problem Relation Age of Onset    Coronary artery disease Mother     Heart disease Father     Lung cancer Father     Cancer Father     Stomach cancer Brother          Current Outpatient Medications:     allopurinol (ZYLOPRIM) 100 mg tablet, Take 1 tablet (100 mg total) by mouth daily, Disp: 90 tablet, Rfl: 1    CINNAMON PO, Take 1,000 mg by mouth daily, Disp: , Rfl:     glimepiride (AMARYL) 2 mg tablet, TAKE 1 TABLET BY MOUTH DAILY WITH BREAKFAST, Disp: 90 tablet, Rfl: 1    lisinopril (ZESTRIL) 10 mg tablet, Take 1 tablet (10 mg total) by mouth daily, Disp: 90 tablet, Rfl: 1    metFORMIN (GLUCOPHAGE) 850 mg tablet, TAKE 1 TABLET (850 MG TOTAL) BY MOUTH 2 (TWO) TIMES A DAY WITH MEALS, Disp: 180 tablet, Rfl: 1    Multiple Vitamin (multivitamin) capsule, Take 1 capsule by mouth daily, Disp: , Rfl:     simvastatin (ZOCOR) 10 mg tablet, Take 1 tablet (10 mg total) by mouth daily at bedtime, Disp: 90 tablet, Rfl: 3    Allergies   Allergen Reactions    Penicillins      Unknown as child       Vitals:    08/04/22 0751   BP: 136/70   Pulse: 79   Resp: 16   SpO2: 96%   Weight: 129 kg (285 lb)   Height: 5' 8" (3 927 m)

## 2022-08-04 NOTE — ASSESSMENT & PLAN NOTE
Lab Results   Component Value Date    HGBA1C 6 7 (H) 07/05/2022   Diabetes is well controlled, continue same medication,, advised to have regular exercise and try to lose more weight

## 2022-10-08 DIAGNOSIS — E78.5 HYPERLIPIDEMIA, UNSPECIFIED HYPERLIPIDEMIA TYPE: ICD-10-CM

## 2022-10-12 PROBLEM — Z00.00 MEDICARE ANNUAL WELLNESS VISIT, SUBSEQUENT: Status: RESOLVED | Noted: 2018-09-20 | Resolved: 2022-10-12

## 2022-11-04 ENCOUNTER — APPOINTMENT (OUTPATIENT)
Dept: LAB | Facility: CLINIC | Age: 67
End: 2022-11-04

## 2022-11-04 DIAGNOSIS — N18.31 TYPE 2 DIABETES MELLITUS WITH STAGE 3A CHRONIC KIDNEY DISEASE, WITHOUT LONG-TERM CURRENT USE OF INSULIN (HCC): ICD-10-CM

## 2022-11-04 DIAGNOSIS — E11.22 TYPE 2 DIABETES MELLITUS WITH STAGE 3A CHRONIC KIDNEY DISEASE, WITHOUT LONG-TERM CURRENT USE OF INSULIN (HCC): ICD-10-CM

## 2022-11-04 LAB
ALBUMIN SERPL BCP-MCNC: 3.8 G/DL (ref 3.5–5)
ALP SERPL-CCNC: 71 U/L (ref 34–104)
ALT SERPL W P-5'-P-CCNC: 27 U/L (ref 7–52)
ANION GAP SERPL CALCULATED.3IONS-SCNC: 5 MMOL/L (ref 4–13)
AST SERPL W P-5'-P-CCNC: 22 U/L (ref 13–39)
BASOPHILS # BLD AUTO: 0.06 THOUSANDS/ÂΜL (ref 0–0.1)
BASOPHILS NFR BLD AUTO: 1 % (ref 0–1)
BILIRUB SERPL-MCNC: 0.4 MG/DL (ref 0.2–1)
BUN SERPL-MCNC: 29 MG/DL (ref 5–25)
CALCIUM SERPL-MCNC: 9.5 MG/DL (ref 8.4–10.2)
CHLORIDE SERPL-SCNC: 102 MMOL/L (ref 96–108)
CHOLEST SERPL-MCNC: 130 MG/DL
CO2 SERPL-SCNC: 31 MMOL/L (ref 21–32)
CREAT SERPL-MCNC: 1.23 MG/DL (ref 0.6–1.3)
EOSINOPHIL # BLD AUTO: 0.32 THOUSAND/ÂΜL (ref 0–0.61)
EOSINOPHIL NFR BLD AUTO: 3 % (ref 0–6)
ERYTHROCYTE [DISTWIDTH] IN BLOOD BY AUTOMATED COUNT: 14.1 % (ref 11.6–15.1)
EST. AVERAGE GLUCOSE BLD GHB EST-MCNC: 146 MG/DL
GFR SERPL CREATININE-BSD FRML MDRD: 60 ML/MIN/1.73SQ M
GLUCOSE P FAST SERPL-MCNC: 152 MG/DL (ref 65–99)
HBA1C MFR BLD: 6.7 %
HCT VFR BLD AUTO: 41.4 % (ref 36.5–49.3)
HDLC SERPL-MCNC: 36 MG/DL
HGB BLD-MCNC: 13 G/DL (ref 12–17)
IMM GRANULOCYTES # BLD AUTO: 0.06 THOUSAND/UL (ref 0–0.2)
IMM GRANULOCYTES NFR BLD AUTO: 1 % (ref 0–2)
LDLC SERPL CALC-MCNC: 57 MG/DL (ref 0–100)
LYMPHOCYTES # BLD AUTO: 2.87 THOUSANDS/ÂΜL (ref 0.6–4.47)
LYMPHOCYTES NFR BLD AUTO: 28 % (ref 14–44)
MCH RBC QN AUTO: 28.7 PG (ref 26.8–34.3)
MCHC RBC AUTO-ENTMCNC: 31.4 G/DL (ref 31.4–37.4)
MCV RBC AUTO: 91 FL (ref 82–98)
MONOCYTES # BLD AUTO: 0.74 THOUSAND/ÂΜL (ref 0.17–1.22)
MONOCYTES NFR BLD AUTO: 7 % (ref 4–12)
NEUTROPHILS # BLD AUTO: 6.05 THOUSANDS/ÂΜL (ref 1.85–7.62)
NEUTS SEG NFR BLD AUTO: 60 % (ref 43–75)
NONHDLC SERPL-MCNC: 94 MG/DL
NRBC BLD AUTO-RTO: 0 /100 WBCS
PLATELET # BLD AUTO: 192 THOUSANDS/UL (ref 149–390)
PMV BLD AUTO: 11.4 FL (ref 8.9–12.7)
POTASSIUM SERPL-SCNC: 5 MMOL/L (ref 3.5–5.3)
PROT SERPL-MCNC: 6.7 G/DL (ref 6.4–8.4)
RBC # BLD AUTO: 4.53 MILLION/UL (ref 3.88–5.62)
SODIUM SERPL-SCNC: 138 MMOL/L (ref 135–147)
TRIGL SERPL-MCNC: 187 MG/DL
WBC # BLD AUTO: 10.1 THOUSAND/UL (ref 4.31–10.16)

## 2022-12-02 ENCOUNTER — RA CDI HCC (OUTPATIENT)
Dept: OTHER | Facility: HOSPITAL | Age: 67
End: 2022-12-02

## 2022-12-02 DIAGNOSIS — E11.22 TYPE 2 DIABETES MELLITUS WITH STAGE 3A CHRONIC KIDNEY DISEASE, WITHOUT LONG-TERM CURRENT USE OF INSULIN (HCC): ICD-10-CM

## 2022-12-02 DIAGNOSIS — N18.31 TYPE 2 DIABETES MELLITUS WITH STAGE 3A CHRONIC KIDNEY DISEASE, WITHOUT LONG-TERM CURRENT USE OF INSULIN (HCC): ICD-10-CM

## 2022-12-02 RX ORDER — GLIMEPIRIDE 2 MG/1
2 TABLET ORAL
Qty: 90 TABLET | Refills: 1 | Status: SHIPPED | OUTPATIENT
Start: 2022-12-02

## 2022-12-12 ENCOUNTER — TELEPHONE (OUTPATIENT)
Dept: FAMILY MEDICINE CLINIC | Facility: CLINIC | Age: 67
End: 2022-12-12

## 2022-12-12 ENCOUNTER — OFFICE VISIT (OUTPATIENT)
Dept: FAMILY MEDICINE CLINIC | Facility: CLINIC | Age: 67
End: 2022-12-12

## 2022-12-12 VITALS
SYSTOLIC BLOOD PRESSURE: 140 MMHG | HEART RATE: 78 BPM | BODY MASS INDEX: 43.19 KG/M2 | RESPIRATION RATE: 16 BRPM | WEIGHT: 285 LBS | HEIGHT: 68 IN | DIASTOLIC BLOOD PRESSURE: 70 MMHG | OXYGEN SATURATION: 97 %

## 2022-12-12 DIAGNOSIS — Z00.00 MEDICARE ANNUAL WELLNESS VISIT, SUBSEQUENT: ICD-10-CM

## 2022-12-12 DIAGNOSIS — E78.2 MIXED HYPERLIPIDEMIA: ICD-10-CM

## 2022-12-12 DIAGNOSIS — Z23 NEEDS FLU SHOT: ICD-10-CM

## 2022-12-12 DIAGNOSIS — N18.31 TYPE 2 DIABETES MELLITUS WITH STAGE 3A CHRONIC KIDNEY DISEASE, WITHOUT LONG-TERM CURRENT USE OF INSULIN (HCC): Primary | ICD-10-CM

## 2022-12-12 DIAGNOSIS — I10 BENIGN ESSENTIAL HYPERTENSION: ICD-10-CM

## 2022-12-12 DIAGNOSIS — M1A.9XX0 CHRONIC GOUT WITHOUT TOPHUS, UNSPECIFIED CAUSE, UNSPECIFIED SITE: ICD-10-CM

## 2022-12-12 DIAGNOSIS — E11.22 TYPE 2 DIABETES MELLITUS WITH STAGE 3A CHRONIC KIDNEY DISEASE, WITHOUT LONG-TERM CURRENT USE OF INSULIN (HCC): Primary | ICD-10-CM

## 2022-12-12 LAB
LEFT EYE DIABETIC RETINOPATHY: NORMAL
LEFT EYE IMAGE QUALITY: NORMAL
LEFT EYE MACULAR EDEMA: NORMAL
LEFT EYE OTHER RETINOPATHY: NORMAL
RIGHT EYE DIABETIC RETINOPATHY: NORMAL
RIGHT EYE IMAGE QUALITY: NORMAL
RIGHT EYE MACULAR EDEMA: NORMAL
RIGHT EYE OTHER RETINOPATHY: NORMAL
SEVERITY (EYE EXAM): NORMAL

## 2022-12-12 NOTE — ASSESSMENT & PLAN NOTE
Hypertension is stable, continue low-salt diet, continue same medications    On lisinopril home reading much better

## 2022-12-12 NOTE — PATIENT INSTRUCTIONS
Medicare Preventive Visit Patient Instructions  Thank you for completing your Welcome to Medicare Visit or Medicare Annual Wellness Visit today  Your next wellness visit will be due in one year (12/13/2023)  The screening/preventive services that you may require over the next 5-10 years are detailed below  Some tests may not apply to you based off risk factors and/or age  Screening tests ordered at today's visit but not completed yet may show as past due  Also, please note that scanned in results may not display below  Preventive Screenings:  Service Recommendations Previous Testing/Comments   Colorectal Cancer Screening  · Colonoscopy    · Fecal Occult Blood Test (FOBT)/Fecal Immunochemical Test (FIT)  · Fecal DNA/Cologuard Test  · Flexible Sigmoidoscopy Age: 39-70 years old   Colonoscopy: every 10 years (May be performed more frequently if at higher risk)  OR  FOBT/FIT: every 1 year  OR  Cologuard: every 3 years  OR  Sigmoidoscopy: every 5 years  Screening may be recommended earlier than age 39 if at higher risk for colorectal cancer  Also, an individualized decision between you and your healthcare provider will decide whether screening between the ages of 74-80 would be appropriate   Colonoscopy: 12/13/2018  FOBT/FIT: Not on file  Cologuard: Not on file  Sigmoidoscopy: Not on file    Screening Current     Prostate Cancer Screening Individualized decision between patient and health care provider in men between ages of 53-78   Medicare will cover every 12 months beginning on the day after your 50th birthday PSA: 2 2 ng/mL     Screening Current     Hepatitis C Screening Once for adults born between 1945 and 1965  More frequently in patients at high risk for Hepatitis C Hep C Antibody: 08/13/2019    Screening Current   Diabetes Screening 1-2 times per year if you're at risk for diabetes or have pre-diabetes Fasting glucose: 152 mg/dL (11/4/2022)  A1C: 6 7 % (11/4/2022)  Screening Not Indicated  History Diabetes Cholesterol Screening Once every 5 years if you don't have a lipid disorder  May order more often based on risk factors  Lipid panel: 11/04/2022  Screening Not Indicated  History Lipid Disorder      Other Preventive Screenings Covered by Medicare:  1  Abdominal Aortic Aneurysm (AAA) Screening: covered once if your at risk  You're considered to be at risk if you have a family history of AAA or a male between the age of 73-68 who smoking at least 100 cigarettes in your lifetime  2  Lung Cancer Screening: covers low dose CT scan once per year if you meet all of the following conditions: (1) Age 50-69; (2) No signs or symptoms of lung cancer; (3) Current smoker or have quit smoking within the last 15 years; (4) You have a tobacco smoking history of at least 20 pack years (packs per day x number of years you smoked); (5) You get a written order from a healthcare provider  3  Glaucoma Screening: covered annually if you're considered high risk: (1) You have diabetes OR (2) Family history of glaucoma OR (3)  aged 48 and older OR (3)  American aged 72 and older  3  Osteoporosis Screening: covered every 2 years if you meet one of the following conditions: (1) Have a vertebral abnormality; (2) On glucocorticoid therapy for more than 3 months; (3) Have primary hyperparathyroidism; (4) On osteoporosis medications and need to assess response to drug therapy  5  HIV Screening: covered annually if you're between the age of 12-76  Also covered annually if you are younger than 13 and older than 72 with risk factors for HIV infection  For pregnant patients, it is covered up to 3 times per pregnancy      Immunizations:  Immunization Recommendations   Influenza Vaccine Annual influenza vaccination during flu season is recommended for all persons aged >= 6 months who do not have contraindications   Pneumococcal Vaccine   * Pneumococcal conjugate vaccine = PCV13 (Prevnar 13), PCV15 (Vaxneuvance), PCV20 (Prevnar 20)  * Pneumococcal polysaccharide vaccine = PPSV23 (Pneumovax) Adults 2364 years old: 1-3 doses may be recommended based on certain risk factors  Adults 72 years old: 1-2 doses may be recommended based off what pneumonia vaccine you previously received   Hepatitis B Vaccine 3 dose series if at intermediate or high risk (ex: diabetes, end stage renal disease, liver disease)   Tetanus (Td) Vaccine - COST NOT COVERED BY MEDICARE PART B Following completion of primary series, a booster dose should be given every 10 years to maintain immunity against tetanus  Td may also be given as tetanus wound prophylaxis  Tdap Vaccine - COST NOT COVERED BY MEDICARE PART B Recommended at least once for all adults  For pregnant patients, recommended with each pregnancy  Shingles Vaccine (Shingrix) - COST NOT COVERED BY MEDICARE PART B  2 shot series recommended in those aged 48 and above     Health Maintenance Due:      Topic Date Due   • Colorectal Cancer Screening  12/13/2023   • Hepatitis C Screening  Completed     Immunizations Due:      Topic Date Due   • COVID-19 Vaccine (3 - Booster for Pfizer series) 09/22/2021   • Influenza Vaccine (1) 09/01/2022     Advance Directives   What are advance directives? Advance directives are legal documents that state your wishes and plans for medical care  These plans are made ahead of time in case you lose your ability to make decisions for yourself  Advance directives can apply to any medical decision, such as the treatments you want, and if you want to donate organs  What are the types of advance directives? There are many types of advance directives, and each state has rules about how to use them  You may choose a combination of any of the following:  · Living will: This is a written record of the treatment you want  You can also choose which treatments you do not want, which to limit, and which to stop at a certain time   This includes surgery, medicine, IV fluid, and tube feedings  · Durable power of  for healthcare [facility]): This is a written record that states who you want to make healthcare choices for you when you are unable to make them for yourself  This person, called a proxy, is usually a family member or a friend  You may choose more than 1 proxy  · Do not resuscitate (DNR) order:  A DNR order is used in case your heart stops beating or you stop breathing  It is a request not to have certain forms of treatment, such as CPR  A DNR order may be included in other types of advance directives  · Medical directive: This covers the care that you want if you are in a coma, near death, or unable to make decisions for yourself  You can list the treatments you want for each condition  Treatment may include pain medicine, surgery, blood transfusions, dialysis, IV or tube feedings, and a ventilator (breathing machine)  · Values history: This document has questions about your views, beliefs, and how you feel and think about life  This information can help others choose the care that you would choose  Why are advance directives important? An advance directive helps you control your care  Although spoken wishes may be used, it is better to have your wishes written down  Spoken wishes can be misunderstood, or not followed  Treatments may be given even if you do not want them  An advance directive may make it easier for your family to make difficult choices about your care  Weight Management   Why it is important to manage your weight:  Being overweight increases your risk of health conditions such as heart disease, high blood pressure, type 2 diabetes, and certain types of cancer  It can also increase your risk for osteoarthritis, sleep apnea, and other respiratory problems  Aim for a slow, steady weight loss  Even a small amount of weight loss can lower your risk of health problems    How to lose weight safely:  A safe and healthy way to lose weight is to eat fewer calories and get regular exercise  You can lose up about 1 pound a week by decreasing the number of calories you eat by 500 calories each day  Healthy meal plan for weight management:  A healthy meal plan includes a variety of foods, contains fewer calories, and helps you stay healthy  A healthy meal plan includes the following:  · Eat whole-grain foods more often  A healthy meal plan should contain fiber  Fiber is the part of grains, fruits, and vegetables that is not broken down by your body  Whole-grain foods are healthy and provide extra fiber in your diet  Some examples of whole-grain foods are whole-wheat breads and pastas, oatmeal, brown rice, and bulgur  · Eat a variety of vegetables every day  Include dark, leafy greens such as spinach, kale, katharine greens, and mustard greens  Eat yellow and orange vegetables such as carrots, sweet potatoes, and winter squash  · Eat a variety of fruits every day  Choose fresh or canned fruit (canned in its own juice or light syrup) instead of juice  Fruit juice has very little or no fiber  · Eat low-fat dairy foods  Drink fat-free (skim) milk or 1% milk  Eat fat-free yogurt and low-fat cottage cheese  Try low-fat cheeses such as mozzarella and other reduced-fat cheeses  · Choose meat and other protein foods that are low in fat  Choose beans or other legumes such as split peas or lentils  Choose fish, skinless poultry (chicken or turkey), or lean cuts of red meat (beef or pork)  Before you cook meat or poultry, cut off any visible fat  · Use less fat and oil  Try baking foods instead of frying them  Add less fat, such as margarine, sour cream, regular salad dressing and mayonnaise to foods  Eat fewer high-fat foods  Some examples of high-fat foods include french fries, doughnuts, ice cream, and cakes  · Eat fewer sweets  Limit foods and drinks that are high in sugar  This includes candy, cookies, regular soda, and sweetened drinks    Exercise:  Exercise at least 30 minutes per day on most days of the week  Some examples of exercise include walking, biking, dancing, and swimming  You can also fit in more physical activity by taking the stairs instead of the elevator or parking farther away from stores  Ask your healthcare provider about the best exercise plan for you  © Copyright [employer] 2018 Information is for End User's use only and may not be sold, redistributed or otherwise used for commercial purposes   All illustrations and images included in CareNotes® are the copyrighted property of A [employer] M , Inc  or [address] [place] [address]

## 2022-12-12 NOTE — PROGRESS NOTES
Assessment/Plan:    Problem List Items Addressed This Visit        Endocrine    Type 2 diabetes mellitus with stage 3 chronic kidney disease, without long-term current use of insulin ([facility] [address] ) - Primary       Lab Results   Component Value Date    HGBA1C 6 7 (H) 11/04/2022   cont same dose metformin and glimpride          Relevant Orders    Diabetic foot exam    IRIS Diabetic eye exam    Hemoglobin H3E    Basic metabolic panel    CBC and Platelet    Microalbumin / creatinine urine ratio    TSH, 3rd generation with Free T4 reflex    Lipid Panel with Direct LDL reflex       Cardiovascular and Mediastinum    Benign essential hypertension     Hypertension is stable, continue low-salt diet, continue same medications  On lisinopril home reading much better             Other    Gout     No recent gout , cont allopurinol         Relevant Orders    Uric acid    Hyperlipidemia     Low fat diet   Trend is up , cont statin         Medicare annual wellness visit, subsequent    Needs flu shot    Relevant Orders    influenza vaccine, high-dose, PF 0 7 mL (FLUZONE HIGH-DOSE) (Completed)       Chief Complaint   Patient presents with   • Medicare Wellness Visit   • Follow-up       Subjective:   Patient ID: Oliver Villalpando is a 79 y o  male  AWV and Follow-up on diabetes, , chronic kidney disease he tries to drink fluids  he also follows urologist his PSA has been normal he denies any new symptoms, has strong coronary artery disease in his family,        Review of Systems   Constitutional: Negative for activity change, appetite change, chills, fatigue, fever and unexpected weight change  HENT: Negative for congestion, ear discharge, ear pain, nosebleeds, postnasal drip, rhinorrhea, sinus pressure, sneezing, sore throat, trouble swallowing and voice change  Eyes: Negative for photophobia, pain, discharge, redness and itching  Respiratory: Negative for cough, chest tightness, shortness of breath and wheezing      Cardiovascular: Negative for chest pain, palpitations and leg swelling  Gastrointestinal: Negative for abdominal pain, constipation, diarrhea, nausea and vomiting  Endocrine: Negative for polyuria  Genitourinary: Negative for dysuria, frequency and urgency  Musculoskeletal: Negative for arthralgias, back pain, myalgias and neck pain  Skin: Negative for color change, pallor and rash  Allergic/Immunologic: Negative for environmental allergies and food allergies  Neurological: Negative for dizziness, weakness, light-headedness and headaches  Hematological: Negative for adenopathy  Does not bruise/bleed easily  Psychiatric/Behavioral: Positive for sleep disturbance  Negative for behavioral problems  The patient is not nervous/anxious  Objective:  Physical Exam  Vitals and nursing note reviewed  Constitutional:       Appearance: Normal appearance  He is well-developed  He is obese  HENT:      Head: Normocephalic and atraumatic  Mouth/Throat:      Pharynx: No oropharyngeal exudate  Eyes:      General: No scleral icterus  Right eye: No discharge  Left eye: No discharge  Extraocular Movements: Extraocular movements intact  Neck:      Thyroid: No thyromegaly  Trachea: No tracheal deviation  Cardiovascular:      Rate and Rhythm: Normal rate and regular rhythm  Pulses: no weak pulses          Dorsalis pedis pulses are 2+ on the right side and 2+ on the left side  Heart sounds: Normal heart sounds  No murmur heard  Pulmonary:      Effort: Pulmonary effort is normal  No respiratory distress  Breath sounds: Normal breath sounds  No wheezing or rales  Abdominal:      General: Bowel sounds are normal  There is no distension  Palpations: Abdomen is soft  There is no mass  Tenderness: There is no abdominal tenderness  There is no rebound  Musculoskeletal:         General: Normal range of motion  Cervical back: Normal range of motion and neck supple  Right lower leg: Edema present  Left lower leg: Edema present  Feet:    Feet:      Right foot:      Skin integrity: Callus present  No ulcer, skin breakdown, erythema, warmth or dry skin  Left foot:      Skin integrity: Callus present  No ulcer, skin breakdown, erythema, warmth or dry skin  Lymphadenopathy:      Cervical: No cervical adenopathy  Skin:     General: Skin is warm  Coloration: Skin is not pale  Findings: No erythema or rash  Neurological:      Mental Status: He is alert and oriented to person, place, and time  Cranial Nerves: No cranial nerve deficit  Deep Tendon Reflexes: Reflexes are normal and symmetric  Psychiatric:         Behavior: Behavior normal          Thought Content: Thought content normal          Judgment: Judgment normal             Past Surgical History:   Procedure Laterality Date   • CARPAL TUNNEL RELEASE Bilateral     Last Assessed:9/26/2014   • CYSTOSCOPY     • EYE SURGERY  09/04/2019    Detached retina   • HERNIA REPAIR      Femoral Hernia Repair Unilateral   • MT COLONOSCOPY FLX DX W/COLLJ SPEC WHEN PFRMD N/A 12/13/2018    Procedure: COLONOSCOPY;  Surgeon: Sara Nur MD;  [facility];   Service: Gastroenterology   • MT CYSTO/URETERO W/LITHOTRIPSY &INDWELL STENT INSRT Left 8/11/2016    Procedure: CYSTOSCOPY URETEROSCOPY,RETROGRADE PYELOGRAM AND INSERTION STENT URETERAL;  Surgeon: Huey Barahona MD;  [facility];  Service: Urology   • MT LITHOTRIPSY XTRCORP SHOCK WAVE Right 11/17/2016    Procedure: ESWL;  Surgeon: Huey Barahona MD;  [facility];  Service: Urology       Family History   Problem Relation Age of Onset   • Coronary artery disease Mother    • Heart disease Father    • Lung cancer Father    • Cancer Father    • Stomach cancer Brother          Current Outpatient Medications:   •  allopurinol (ZYLOPRIM) 100 mg tablet, Take 1 tablet (100 mg total) by mouth daily, Disp: 90 tablet, Rfl: 1  • CINNAMON PO, Take 1,000 mg by mouth daily, Disp: , Rfl:   •  glimepiride (AMARYL) 2 mg tablet, Take 1 tablet (2 mg total) by mouth daily with breakfast, Disp: 90 tablet, Rfl: 1  •  lisinopril (ZESTRIL) 10 mg tablet, Take 1 tablet (10 mg total) by mouth daily, Disp: 90 tablet, Rfl: 1  •  metFORMIN (GLUCOPHAGE) 850 mg tablet, TAKE 1 TABLET BY MOUTH 2 TIMES A DAY WITH MEALS , Disp: 180 tablet, Rfl: 1  •  Multiple Vitamin (multivitamin) capsule, Take 1 capsule by mouth daily, Disp: , Rfl:   •  simvastatin (ZOCOR) 10 mg tablet, TAKE 1 TABLET BY MOUTH DAILY AT BEDTIME, Disp: 90 tablet, Rfl: 0    Allergies   Allergen Reactions   • Penicillins      Unknown as child       Vitals:    12/12/22 0754   BP: 140/70   Pulse: 78   Resp: 16   SpO2: 97%   Weight: 129 kg (285 lb)   Height: 5' 8" (1 727 m)      Assessment and Plan:     Problem List Items Addressed This Visit        Endocrine    Type 2 diabetes mellitus with stage 3 chronic kidney disease, without long-term current use of insulin ([facility]) - Primary       Lab Results   Component Value Date    HGBA1C 6 7 (H) 11/04/2022   cont same dose metformin and glimpride          Relevant Orders    Diabetic foot exam    IRIS Diabetic eye exam    Hemoglobin F8V    Basic metabolic panel    CBC and Platelet    Microalbumin / creatinine urine ratio    TSH, 3rd generation with Free T4 reflex    Lipid Panel with Direct LDL reflex       Cardiovascular and Mediastinum    Benign essential hypertension     Hypertension is stable, continue low-salt diet, continue same medications  On lisinopril home reading much better             Other    Gout     No recent gout , cont allopurinol         Relevant Orders    Uric acid    Hyperlipidemia     Low fat diet   Trend is up , cont statin         Medicare annual wellness visit, subsequent    Needs flu shot    Relevant Orders    influenza vaccine, high-dose, PF 0 7 mL (FLUZONE HIGH-DOSE) (Completed)   Patient's shoes and socks removed      Right Foot/Ankle Right Foot Inspection  Skin Exam: skin normal, callus and callus  Skin not intact, no dry skin, no warmth, no erythema, no maceration, no abnormal color, no pre-ulcer and no ulcer  Toe Exam: ROM and strength within normal limits  Sensory   Vibration: intact  Proprioception: intact  Monofilament testing: intact    Vascular  Capillary refills: < 3 seconds  The right DP pulse is 2+  Left Foot/Ankle  Left Foot Inspection  Skin Exam: skin normal and callus  Skin not intact, no dry skin, no warmth, no erythema, no maceration, normal color, no pre-ulcer and no ulcer  Toe Exam: ROM and strength within normal limits  Sensory   Vibration: intact  Proprioception: intact  Monofilament testing: intact    Vascular  Capillary refills: < 3 seconds  The left DP pulse is 2+  Assign Risk Category  No deformity present  No loss of protective sensation  No weak pulses  Risk: 2          BMI Counseling: Body mass index is 43 33 kg/m²  The BMI is above normal  Nutrition recommendations include decreasing portion sizes, moderation in carbohydrate intake and reducing intake of cholesterol  Exercise recommendations include exercising 3-5 times per week and strength training exercises  Rationale for BMI follow-up plan is due to patient being overweight or obese  Depression Screening and Follow-up Plan: Patient was screened for depression during today's encounter  They screened negative with a PHQ-2 score of 0  Preventive health issues were discussed with patient, and age appropriate screening tests were ordered as noted in patient's After Visit Summary  Personalized health advice and appropriate referrals for health education or preventive services given if needed, as noted in patient's After Visit Summary       History of Present Illness:     Patient presents for Medicare Annual Wellness visit    Patient Care Team:  Filomena Troy MD as PCP - General  Filomena Troy MD as PCP - PCP-[facility] Attributed-MD Jeane Loyd MD Kandee Breslow, MD (Gastroenterology)  Shashi Muñoz MD (Nephrology)  Landon Muro MD as Endoscopist     Problem List:     Patient Active Problem List   Diagnosis   • Type 2 diabetes mellitus with stage 3 chronic kidney disease, without long-term current use of insulin ([place] [address] )   • Gout   • Morbid obesity with BMI of 40 0-44 9, adult ([facility] )   • Nephrolithiasis   • Benign essential hypertension   • Callus   • Hyperlipidemia   • Onychomycosis of toenail   • Plantar callus   • Tinea pedis of left foot   • Medicare annual wellness visit, subsequent   • Needs flu shot   • CKD (chronic kidney disease) stage 3, GFR 30-59 ml/min ([place] [address] )      Past Medical and Surgical History:     Past Medical History:   Diagnosis Date   • Diabetes mellitus ([place] [address] )    • Gout    • Hypertension    • Leukocytosis     Onset:4/26/2012   • Onychomycosis     Onse:4/26/2012   • Other muscle spasm    • Renal calculi    • Shoulder joint pain      Past Surgical History:   Procedure Laterality Date   • CARPAL TUNNEL RELEASE Bilateral     Last Assessed:9/26/2014   • CYSTOSCOPY     • EYE SURGERY  09/04/2019    Detached retina   • HERNIA REPAIR      Femoral Hernia Repair Unilateral   • CA COLONOSCOPY FLX DX W/COLLJ SPEC WHEN PFRMD N/A 12/13/2018    Procedure: COLONOSCOPY;  Surgeon: Landon Muro MD;  [facility];   Service: Gastroenterology   • CA CYSTO/URETERO W/LITHOTRIPSY &INDWELL STENT INSRT Left 8/11/2016    Procedure: CYSTOSCOPY URETEROSCOPY,RETROGRADE PYELOGRAM AND INSERTION STENT URETERAL;  Surgeon: Felipe Farr MD;  [facility];  Service: Urology   • CA LITHOTRIPSY XTRCORP SHOCK WAVE Right 11/17/2016    Procedure: ESWL;  Surgeon: Felipe Farr MD;  [facility];  Service: Urology      Family History:     Family History   Problem Relation Age of Onset   • Coronary artery disease Mother    • Heart disease Father    • Lung cancer Father    • Cancer Father    • Stomach cancer Brother       Social History:     Social History     Socioeconomic History   • Marital status: Single     Spouse name: None   • Number of children: None   • Years of education: None   • Highest education level: None   Occupational History   • Occupation: RETIRED   Tobacco Use   • Smoking status: Never   • Smokeless tobacco: Never   Substance and Sexual Activity   • Alcohol use: Not Currently   • Drug use: No   • Sexual activity: None   Other Topics Concern   • None   Social History Narrative    Living independently alone     Social Determinants of Health     Financial Resource Strain: Not on file   Food Insecurity: Not on file   Transportation Needs: Not on file   Physical Activity: Not on file   Stress: Not on file   Social Connections: Not on file   Intimate Partner Violence: Not on file   Housing Stability: Not on file      Medications and Allergies:     Current Outpatient Medications   Medication Sig Dispense Refill   • allopurinol (ZYLOPRIM) 100 mg tablet Take 1 tablet (100 mg total) by mouth daily 90 tablet 1   • CINNAMON PO Take 1,000 mg by mouth daily     • glimepiride (AMARYL) 2 mg tablet Take 1 tablet (2 mg total) by mouth daily with breakfast 90 tablet 1   • lisinopril (ZESTRIL) 10 mg tablet Take 1 tablet (10 mg total) by mouth daily 90 tablet 1   • metFORMIN (GLUCOPHAGE) 850 mg tablet TAKE 1 TABLET BY MOUTH 2 TIMES A DAY WITH MEALS  180 tablet 1   • Multiple Vitamin (multivitamin) capsule Take 1 capsule by mouth daily     • simvastatin (ZOCOR) 10 mg tablet TAKE 1 TABLET BY MOUTH DAILY AT BEDTIME 90 tablet 0     No current facility-administered medications for this visit       Allergies   Allergen Reactions   • Penicillins      Unknown as child      Immunizations:     Immunization History   Administered Date(s) Administered   • COVID-19 PFIZER VACCINE 0 3 ML IM 04/01/2021, 04/22/2021   • INFLUENZA 12/02/2021   • Influenza, high dose seasonal 0 7 mL 12/07/2020, 12/02/2021, 12/12/2022   • Influenza, recombinant, quadrivalent,injectable, preservative free 09/20/2018, 12/03/2019   • Pneumococcal Conjugate 13-Valent 05/13/2016   • Pneumococcal Polysaccharide PPV23 05/13/2019   • Tdap 05/13/2016      Health Maintenance:         Topic Date Due   • Colorectal Cancer Screening  12/13/2023   • Hepatitis C Screening  Completed         Topic Date Due   • COVID-19 Vaccine (3 - Booster for Samir Leija series) 09/22/2021      Medicare Health Risk Assessment:     /70   Pulse 78   Resp 16   Ht 5' 8" (1 727 m)   Wt 129 kg (285 lb)   SpO2 97%   BMI 43 33 kg/m²      Krystle Deleon is here for his Subsequent Wellness visit  Health Risk Assessment:   Patient rates overall health as good  Patient feels that their physical health rating is same  Patient is satisfied with their life  Eyesight was rated as same  Hearing was rated as same  Patient feels that their emotional and mental health rating is same  Patients states they are never, rarely angry  Patient states they are never, rarely unusually tired/fatigued  Pain experienced in the last 7 days has been none  Fall Risk Screening: In the past year, patient has experienced: no history of falling in past year      Home Safety:  Patient does not have trouble with stairs inside or outside of their home  Patient has working smoke alarms and has no working carbon monoxide detector  Home safety hazards include: none  Nutrition:   Current diet is Diabetic  Medications:   Patient is currently taking over-the-counter supplements  OTC medications include: see medication list  Patient is able to manage medications  Activities of Daily Living (ADLs)/Instrumental Activities of Daily Living (IADLs):   Walk and transfer into and out of bed and chair?: Yes  Dress and groom yourself?: Yes    Bathe or shower yourself?: Yes    Feed yourself?  Yes  Do your laundry/housekeeping?: Yes  Manage your money, pay your bills and track your expenses?: Yes  Make your own meals?: Yes    Do your own shopping?: Yes    Previous Hospitalizations:   Any hospitalizations or ED visits within the last 12 months?: No      Advance Care Planning:   Living will: No    Durable POA for healthcare: No    Advanced directive: No      Cognitive Screening:   Provider or family/friend/caregiver concerned regarding cognition?: No    PREVENTIVE SCREENINGS      Cardiovascular Screening:    General: Screening Not Indicated and History Lipid Disorder      Diabetes Screening:     General: Screening Not Indicated and History Diabetes      Colorectal Cancer Screening:     General: Screening Current      Prostate Cancer Screening:    General: Screening Current      Abdominal Aortic Aneurysm (AAA) Screening:    Risk factors include: age between 73-69 yo        Lung Cancer Screening:     General: Screening Not Indicated      Hepatitis C Screening:    General: Screening Current    Screening, Brief Intervention, and Referral to Treatment (SBIRT)    Screening  Typical number of drinks in a day: 0  Typical number of drinks in a week: 0  Interpretation: Low risk drinking behavior  Single Item Drug Screening:  How often have you used an illegal drug (including marijuana) or a prescription medication for non-medical reasons in the past year? never    Single Item Drug Screen Score: 0  Interpretation: Negative screen for possible drug use disorder      Mary Williamson MD    Physical Exam  Vitals and nursing note reviewed  Constitutional:       Appearance: Normal appearance  He is well-developed  He is obese  HENT:      Head: Normocephalic and atraumatic  Mouth/Throat:      Pharynx: No oropharyngeal exudate  Eyes:      General: No scleral icterus  Right eye: No discharge  Left eye: No discharge  Extraocular Movements: Extraocular movements intact  Neck:      Thyroid: No thyromegaly  Trachea: No tracheal deviation  Cardiovascular:      Rate and Rhythm: Normal rate and regular rhythm  Pulses: no weak pulses          Dorsalis pedis pulses are 2+ on the right side and 2+ on the left side  Heart sounds: Normal heart sounds  No murmur heard  Pulmonary:      Effort: Pulmonary effort is normal  No respiratory distress  Breath sounds: Normal breath sounds  No wheezing or rales  Abdominal:      General: Bowel sounds are normal  There is no distension  Palpations: Abdomen is soft  There is no mass  Tenderness: There is no abdominal tenderness  There is no rebound  Musculoskeletal:         General: Normal range of motion  Cervical back: Normal range of motion and neck supple  Right lower leg: Edema present  Left lower leg: Edema present  Feet:    Feet:      Right foot:      Skin integrity: Callus present  No ulcer, skin breakdown, erythema, warmth or dry skin  Left foot:      Skin integrity: Callus present  No ulcer, skin breakdown, erythema, warmth or dry skin  Lymphadenopathy:      Cervical: No cervical adenopathy  Skin:     General: Skin is warm  Coloration: Skin is not pale  Findings: No erythema or rash  Neurological:      Mental Status: He is alert and oriented to person, place, and time  Cranial Nerves: No cranial nerve deficit  Deep Tendon Reflexes: Reflexes are normal and symmetric  Psychiatric:         Behavior: Behavior normal          Thought Content:  Thought content normal          Judgment: Judgment normal      fungal toe nails

## 2022-12-12 NOTE — TELEPHONE ENCOUNTER
----- Message from Jenny Parmar MD sent at 12/12/2022 10:41 AM EST -----  Normal retinal exam result

## 2022-12-12 NOTE — ASSESSMENT & PLAN NOTE
Lab Results   Component Value Date    HGBA1C 6 7 (H) 11/04/2022   cont same dose metformin and glimpride

## 2023-01-09 DIAGNOSIS — I10 BENIGN ESSENTIAL HYPERTENSION: ICD-10-CM

## 2023-01-09 DIAGNOSIS — N20.0 NEPHROLITHIASIS: ICD-10-CM

## 2023-01-09 RX ORDER — ALLOPURINOL 100 MG/1
100 TABLET ORAL DAILY
Qty: 90 TABLET | Refills: 1 | Status: SHIPPED | OUTPATIENT
Start: 2023-01-09

## 2023-01-09 RX ORDER — LISINOPRIL 10 MG/1
10 TABLET ORAL DAILY
Qty: 90 TABLET | Refills: 1 | Status: SHIPPED | OUTPATIENT
Start: 2023-01-09

## 2023-02-10 PROBLEM — Z00.00 MEDICARE ANNUAL WELLNESS VISIT, SUBSEQUENT: Status: RESOLVED | Noted: 2018-09-20 | Resolved: 2023-02-10

## 2023-02-26 DIAGNOSIS — E78.2 MIXED HYPERLIPIDEMIA: ICD-10-CM

## 2023-03-01 RX ORDER — SIMVASTATIN 10 MG
TABLET ORAL
Qty: 90 TABLET | Refills: 0 | Status: SHIPPED | OUTPATIENT
Start: 2023-03-01

## 2023-04-19 PROBLEM — Z23 NEEDS FLU SHOT: Status: RESOLVED | Noted: 2018-09-20 | Resolved: 2023-04-19

## 2023-05-28 DIAGNOSIS — E78.2 MIXED HYPERLIPIDEMIA: ICD-10-CM

## 2023-05-30 RX ORDER — SIMVASTATIN 10 MG
TABLET ORAL
Qty: 90 TABLET | Refills: 0 | Status: SHIPPED | OUTPATIENT
Start: 2023-05-30

## 2023-06-05 DIAGNOSIS — E11.22 TYPE 2 DIABETES MELLITUS WITH STAGE 3A CHRONIC KIDNEY DISEASE, WITHOUT LONG-TERM CURRENT USE OF INSULIN (HCC): ICD-10-CM

## 2023-06-05 DIAGNOSIS — N18.31 TYPE 2 DIABETES MELLITUS WITH STAGE 3A CHRONIC KIDNEY DISEASE, WITHOUT LONG-TERM CURRENT USE OF INSULIN (HCC): ICD-10-CM

## 2023-06-05 RX ORDER — GLIMEPIRIDE 2 MG/1
2 TABLET ORAL
Qty: 90 TABLET | Refills: 1 | Status: SHIPPED | OUTPATIENT
Start: 2023-06-05

## 2023-07-02 DIAGNOSIS — E78.5 HYPERLIPIDEMIA, UNSPECIFIED HYPERLIPIDEMIA TYPE: ICD-10-CM

## 2023-07-06 DIAGNOSIS — I10 BENIGN ESSENTIAL HYPERTENSION: ICD-10-CM

## 2023-07-06 RX ORDER — LISINOPRIL 10 MG/1
TABLET ORAL
Qty: 90 TABLET | Refills: 1 | Status: SHIPPED | OUTPATIENT
Start: 2023-07-06

## 2023-07-10 DIAGNOSIS — N20.0 NEPHROLITHIASIS: ICD-10-CM

## 2023-07-10 RX ORDER — ALLOPURINOL 100 MG/1
100 TABLET ORAL DAILY
Qty: 90 TABLET | Refills: 1 | Status: SHIPPED | OUTPATIENT
Start: 2023-07-10

## 2023-07-17 NOTE — PROGRESS NOTES
NEPHROLOGY OFFICE VISIT   Ira Garciairmer 59 y o  male MRN: [id number]  11/27/2019    Reason for Visit:  Chronic kidney disease    ASSESSMENT and PLAN:    I had the pleasure of seeing Raad Gandara today in the renal clinic for the continued management of chronic kidney disease  Since our last visit, there has been no ER visits or hospitilizations  He currently has no complaints at this time and is feeling well  Patient denies any chest pain, shortness of breath and swelling  The last blood work was done on November 2019, which we have reviewed together  Patient is currently being evaluated by the bariatric team for weight loss currently undergoing conservative program has been losing weight  His metoprolol has been wean down and his glipizide has been stopped  His diabetes management is being closely monitored by his primary care physician  The goal is to get his weight to around 200 lb  He has already lost about 13 lb thus far  This is very encouraging  He is also Plan for cataract surgery at the end of January 1 ) Chronic kidney disease stage III without proteinuria  -baseline creatinine appears to be between 1 5-1 7 mg/dL  -urinalysis:  Specific gravity 1 015, trace blood, pH 5 5, negative protein  No RBC or WBC  -urine protein creatinine ratio: 0 11  -renal ultrasound:  Right kidney measures 12 3 cm, left kidney measures 12 1 cm  No evidence of masses lesions and no hydronephrosis  There is a 2 cm exophytic mid to upper pole simple cyst   Simple cyst requires no further workup   -presumed etiology is most likely diabetic nephropathy along with a component of hypertension as well as a role of obesity  -avoid NSAIDs  -he does have a family history of kidney disease  His grandfather was on dialysis    Sister has Chronic Kidney Disease (membranous nephropathy who was also a patient of mine)  -avoid NSAIDs  -blood pressure and diabetic control will be essential and progression of chronic kidney disease  -can continue lisinopril     2 ) Hypertension  -with underlying chronic kidney disease  -blood pressure acceptable at this time  -metoprolol 12 5 mg every 12 hours  -lisinopril 10 mg daily  -blood pressure target less than 130/80  -monitor blood pressure at home  -low 2 g sodium diet  -exercise and weight loss program  -metoprolol is currently being weaned by the bariatric team   Blood pressures seem to be controlled  Plan is to eventually stop the metoprolol  Patient has no history of cardiac disease      3 ) Diabetes mellitus type 2  -hemoglobin A1c: 6 9 (A1C values may be falsely elevated or decreased in those with CKD, assay method should be certified by [employer])  Target A1C < 7  -current medications:  Metformin  -proteinuria:  Not significant  -retinopathy:  No  -neuropathy:  No  -please follow with an ophthalmologist and podiatrist every year  -continued self monitoring of blood glucose at home  -Treatment Management in CKD Recommendations:    Metformin:  Avoid if creatinine clearance is less than 30 cc/min (concern for lactic acidosis)   Sodium-Glucose Cotransporter-2 (SGLT2) Inhibitors: May see an acute drop in the eGFR initially when starting the medication but then a stabilization of the renal function, with slower loss of renal function as compared to placebo  Relative risk of end-stage renal disease, doubling of serum creatinine or death from renal causes were also found to be lower as compared to placebo  ([facility])  Would recommend starting at 100 mg daily, I would avoid use in patients with eGFR < 30 cc/min    If no contraindications exist I would recommend this medication added to the diabetic regiment   Sulfonylureas:  Preferred short-acting (glipizide, glimepiride, repaglinide), relatively safe in patients with non dialysis CKD   Thiazolidinedones/Alpha-Gluosidase inhibitors/Dipeptidyl peptidase-4 inhibitors:  Generally not considered first-line agents in CKD (limited data in long-term safety and efficacy)   Insulin:  Starting dose may need to be lower than what would ordinarily be used, as there is a decrease metabolism of insulin (no dose adjustment if the GFR > 50 mL/min, dose should be reduced to 75% of baseline if GFR 10-50 mL/min, and 50% baseline if GFR < 10 mL/min)       4 ) Nephrolithiasis  -following with Urology  -continue adequate hydration to maintain urine output greater than 2 5 L per day     5 ) Obesity  -BMI improved to 42 48  -just weight loss, exercise and diet  -continue follow-up with bariatric         PATIENT INSTRUCTIONS:    Patient Instructions   1 )  Low 2 g sodium diet    2 )  Monitor weights at home    3 )  Avoid NSAIDs    4 )  Monitor blood pressure at home, call if blood pressure greater than 150/90 persistently              Orders Placed This Encounter   Procedures    Comprehensive metabolic panel     Standing Status:   Future     Standing Expiration Date:   11/27/2020    CBC     Standing Status:   Future     Standing Expiration Date:   11/27/2020    Vitamin D 25 hydroxy     Standing Status:   Future     Standing Expiration Date:   11/27/2020    Protein / creatinine ratio, urine     Standing Status:   Future     Standing Expiration Date:   11/27/2020       OBJECTIVE:  Current Weight: Weight - Scale: 127 kg (279 lb 6 4 oz)  Vitals:    11/27/19 0742   Weight: 127 kg (279 lb 6 4 oz)   Height: 5' 8" (1 727 m)    Body mass index is 42 48 kg/m²  REVIEW OF SYSTEMS:    Review of Systems   Constitutional: Negative for activity change and fever  Respiratory: Negative for cough, chest tightness, shortness of breath and wheezing  Cardiovascular: Negative for chest pain and leg swelling  Gastrointestinal: Negative for abdominal pain, diarrhea, nausea and vomiting  Endocrine: Negative for polyuria  Genitourinary: Negative for difficulty urinating, dysuria, flank pain, frequency and urgency     Skin: Negative for rash  Neurological: Negative for dizziness, syncope, light-headedness and headaches  PHYSICAL EXAM:      Physical Exam   Constitutional: He is oriented to person, place, and time  He appears well-developed and well-nourished  No distress  HENT:   Head: Normocephalic and atraumatic  Eyes: Pupils are equal, round, and reactive to light  No scleral icterus  Neck: Normal range of motion  Neck supple  Cardiovascular: Normal rate, regular rhythm and normal heart sounds  Exam reveals no gallop and no friction rub  No murmur heard  Pulmonary/Chest: Effort normal and breath sounds normal  No respiratory distress  He has no wheezes  He has no rales  He exhibits no tenderness  Abdominal: Soft  Bowel sounds are normal  He exhibits no distension  There is no tenderness  There is no rebound  Musculoskeletal: Normal range of motion  He exhibits no edema  Neurological: He is alert and oriented to person, place, and time  Skin: No rash noted  He is not diaphoretic  Psychiatric: He has a normal mood and affect  Nursing note and vitals reviewed        Medications:    Current Outpatient Medications:     allopurinol (ZYLOPRIM) 100 mg tablet, Take 1 tablet (100 mg total) by mouth daily, Disp: 90 tablet, Rfl: 1    CINNAMON PO, Take 1,000 mg by mouth daily, Disp: , Rfl:     lisinopril (ZESTRIL) 10 mg tablet, Take 1 tablet (10 mg total) by mouth daily, Disp: 90 tablet, Rfl: 1    metFORMIN (GLUCOPHAGE) 500 mg tablet, Take 1 tablet (500 mg total) by mouth 2 (two) times a day with meals for 180 days, Disp: 360 tablet, Rfl: 0    metoprolol tartrate (LOPRESSOR) 25 mg tablet, TAKE 1 TABLET (25 MG TOTAL) BY MOUTH EVERY 12 (TWELVE) HOURS (Patient taking differently: Take 25 mg by mouth daily ), Disp: 180 tablet, Rfl: 0    polymyxin b-trimethoprim (POLYTRIM) ophthalmic solution, INSTILL 1 DROP IN RIGHT EYE FOUR TIMES DAILY FOR SEVEN DAYS, Disp: , Rfl: 2    cholecalciferol (VITAMIN D3) 1,000 units tablet, Take 1,000 Units by mouth daily, Disp: , Rfl:     simvastatin (ZOCOR) 10 mg tablet, Take 1 tablet (10 mg total) by mouth daily at bedtime (Patient not taking: Reported on 11/18/2019), Disp: 90 tablet, Rfl: 0    Laboratory Results:        Invalid input(s): ALBUMIN    Results for orders placed or performed in visit on 11/14/19   CBC and differential   Result Value Ref Range    WBC 9 54 4 31 - 10 16 Thousand/uL    RBC 4 53 3 88 - 5 62 Million/uL    Hemoglobin 13 1 12 0 - 17 0 g/dL    Hematocrit 41 5 36 5 - 49 3 %    MCV 92 82 - 98 fL    MCH 28 9 26 8 - 34 3 pg    MCHC 31 6 31 4 - 37 4 g/dL    RDW 14 0 11 6 - 15 1 %    MPV 11 3 8 9 - 12 7 fL    Platelets 923 605 - 716 Thousands/uL    nRBC 0 /100 WBCs    Neutrophils Relative 67 43 - 75 %    Immat GRANS % 0 0 - 2 %    Lymphocytes Relative 21 14 - 44 %    Monocytes Relative 7 4 - 12 %    Eosinophils Relative 4 0 - 6 %    Basophils Relative 1 0 - 1 %    Neutrophils Absolute 6 38 1 85 - 7 62 Thousands/µL    Immature Grans Absolute 0 03 0 00 - 0 20 Thousand/uL    Lymphocytes Absolute 2 04 0 60 - 4 47 Thousands/µL    Monocytes Absolute 0 65 0 17 - 1 22 Thousand/µL    Eosinophils Absolute 0 38 0 00 - 0 61 Thousand/µL    Basophils Absolute 0 06 0 00 - 0 10 Thousands/µL   Hemoglobin A1C   Result Value Ref Range    Hemoglobin A1C 6 9 (H) 4 2 - 6 3 %     mg/dl   Lipid panel   Result Value Ref Range    Cholesterol 115 50 - 200 mg/dL    Triglycerides 175 (H) <=150 mg/dL    HDL, Direct 33 (L) >=40 mg/dL    LDL Calculated 47 0 - 100 mg/dL    Non-HDL-Chol (CHOL-HDL) 82 mg/dl   TSH, 3rd generation   Result Value Ref Range    TSH 3RD GENERATON 3 170 0 358 - 3 740 uIU/mL   Comprehensive metabolic panel   Result Value Ref Range    Sodium 138 136 - 145 mmol/L    Potassium 4 7 3 5 - 5 3 mmol/L    Chloride 101 100 - 108 mmol/L    CO2 27 21 - 32 mmol/L    ANION GAP 10 4 - 13 mmol/L    BUN 29 (H) 5 - 25 mg/dL    Creatinine 1 38 (H) 0 60 - 1 30 mg/dL    Glucose 170 (H) 65 - 140 mg/dL Calcium 9 1 8 3 - 10 1 mg/dL    AST 15 5 - 45 U/L    ALT 20 12 - 78 U/L    Alkaline Phosphatase 99 46 - 116 U/L    Total Protein 7 3 6 4 - 8 2 g/dL    Albumin 3 4 (L) 3 5 - 5 0 g/dL    Total Bilirubin 0 40 0 20 - 1 00 mg/dL    eGFR 54 ml/min/1 73sq m no

## 2023-08-17 ENCOUNTER — RA CDI HCC (OUTPATIENT)
Dept: OTHER | Facility: HOSPITAL | Age: 68
End: 2023-08-17

## 2023-08-17 ENCOUNTER — APPOINTMENT (OUTPATIENT)
Dept: LAB | Facility: CLINIC | Age: 68
End: 2023-08-17
Payer: MEDICARE

## 2023-08-17 DIAGNOSIS — E11.22 TYPE 2 DIABETES MELLITUS WITH STAGE 3A CHRONIC KIDNEY DISEASE, WITHOUT LONG-TERM CURRENT USE OF INSULIN (HCC): ICD-10-CM

## 2023-08-17 DIAGNOSIS — N18.31 TYPE 2 DIABETES MELLITUS WITH STAGE 3A CHRONIC KIDNEY DISEASE, WITHOUT LONG-TERM CURRENT USE OF INSULIN (HCC): ICD-10-CM

## 2023-08-17 LAB
ALBUMIN SERPL BCP-MCNC: 4 G/DL (ref 3.5–5)
ALP SERPL-CCNC: 76 U/L (ref 34–104)
ALT SERPL W P-5'-P-CCNC: 24 U/L (ref 7–52)
ANION GAP SERPL CALCULATED.3IONS-SCNC: 8 MMOL/L
AST SERPL W P-5'-P-CCNC: 21 U/L (ref 13–39)
BILIRUB SERPL-MCNC: 0.67 MG/DL (ref 0.2–1)
BUN SERPL-MCNC: 30 MG/DL (ref 5–25)
CALCIUM SERPL-MCNC: 9.1 MG/DL (ref 8.4–10.2)
CHLORIDE SERPL-SCNC: 103 MMOL/L (ref 96–108)
CHOLEST SERPL-MCNC: 115 MG/DL
CO2 SERPL-SCNC: 25 MMOL/L (ref 21–32)
CREAT SERPL-MCNC: 1.3 MG/DL (ref 0.6–1.3)
EST. AVERAGE GLUCOSE BLD GHB EST-MCNC: 163 MG/DL
GFR SERPL CREATININE-BSD FRML MDRD: 56 ML/MIN/1.73SQ M
GLUCOSE P FAST SERPL-MCNC: 138 MG/DL (ref 65–99)
HBA1C MFR BLD: 7.3 %
HDLC SERPL-MCNC: 36 MG/DL
LDLC SERPL CALC-MCNC: 42 MG/DL (ref 0–100)
POTASSIUM SERPL-SCNC: 4.2 MMOL/L (ref 3.5–5.3)
PROT SERPL-MCNC: 6.8 G/DL (ref 6.4–8.4)
SODIUM SERPL-SCNC: 136 MMOL/L (ref 135–147)
TRIGL SERPL-MCNC: 187 MG/DL

## 2023-08-17 PROCEDURE — 80061 LIPID PANEL: CPT

## 2023-08-17 PROCEDURE — 80053 COMPREHEN METABOLIC PANEL: CPT

## 2023-08-17 PROCEDURE — 83036 HEMOGLOBIN GLYCOSYLATED A1C: CPT

## 2023-08-17 PROCEDURE — 36415 COLL VENOUS BLD VENIPUNCTURE: CPT

## 2023-08-24 ENCOUNTER — OFFICE VISIT (OUTPATIENT)
Dept: FAMILY MEDICINE CLINIC | Facility: CLINIC | Age: 68
End: 2023-08-24
Payer: MEDICARE

## 2023-08-24 VITALS
HEART RATE: 64 BPM | DIASTOLIC BLOOD PRESSURE: 68 MMHG | OXYGEN SATURATION: 97 % | BODY MASS INDEX: 42.13 KG/M2 | SYSTOLIC BLOOD PRESSURE: 130 MMHG | WEIGHT: 278 LBS | RESPIRATION RATE: 16 BRPM | HEIGHT: 68 IN

## 2023-08-24 DIAGNOSIS — E11.22 TYPE 2 DIABETES MELLITUS WITH STAGE 3A CHRONIC KIDNEY DISEASE, WITHOUT LONG-TERM CURRENT USE OF INSULIN (HCC): Primary | ICD-10-CM

## 2023-08-24 DIAGNOSIS — I10 BENIGN ESSENTIAL HYPERTENSION: ICD-10-CM

## 2023-08-24 DIAGNOSIS — N18.31 TYPE 2 DIABETES MELLITUS WITH STAGE 3A CHRONIC KIDNEY DISEASE, WITHOUT LONG-TERM CURRENT USE OF INSULIN (HCC): Primary | ICD-10-CM

## 2023-08-24 DIAGNOSIS — E78.2 MIXED HYPERLIPIDEMIA: ICD-10-CM

## 2023-08-24 DIAGNOSIS — M1A.9XX0 CHRONIC GOUT WITHOUT TOPHUS, UNSPECIFIED CAUSE, UNSPECIFIED SITE: ICD-10-CM

## 2023-08-24 PROCEDURE — 99214 OFFICE O/P EST MOD 30 MIN: CPT | Performed by: FAMILY MEDICINE

## 2023-08-24 NOTE — PROGRESS NOTES
Name: Derrick Glass      : 1955      MRN: [id number]  Encounter Provider: Dayna Jaimes MD  Encounter Date: 2023   Encounter department: [address] [place] [address]     1. Type 2 diabetes mellitus with stage 3a chronic kidney disease, without long-term current use of insulin (HCC)  Assessment & Plan:    Lab Results   Component Value Date    HGBA1C 7.3 (H) 2023   rybelsus not coverd by insurance , cont same meds     Orders:  -     Albumin / creatinine urine ratio; Future; Expected date: 2023  -     Comprehensive metabolic panel; Future; Expected date: 2023  -     Hemoglobin A1C; Future; Expected date: 2023  -     CBC and Platelet; Future; Expected date: 2023  -     Lipid Panel with Direct LDL reflex; Future; Expected date: 2023  -     TSH, 3rd generation with Free T4 reflex; Future; Expected date: 2023    2. Benign essential hypertension  Assessment & Plan:  Hypertension is stable, continue low-salt diet, continue same medications. 3. Chronic gout without tophus, unspecified cause, unspecified site  Assessment & Plan:  Cont same allopurinol    Orders:  -     Uric acid; Future; Expected date: 2023    4. Mixed hyperlipidemia  Assessment & Plan:  Lab Results   Component Value Date    LDLCALC 42 2023   cont same             Depression Screening and Follow-up Plan: Patient was screened for depression during today's encounter. They screened negative with a PHQ-2 score of 0. Subjective     Diabetes  He presents for his follow-up diabetic visit. He has type 2 diabetes mellitus. His disease course has been stable. Pertinent negatives for hypoglycemia include no dizziness, headaches, hunger, mood changes, nervousness/anxiousness, pallor, seizures, sleepiness, sweats or tremors. There are no diabetic associated symptoms. Pertinent negatives for diabetes include no chest pain, no fatigue, no polyuria and no weakness. There are no hypoglycemic complications. Symptoms are stable. Diabetic complications include nephropathy. Risk factors for coronary artery disease include dyslipidemia, diabetes mellitus, male sex, obesity and hypertension. Current diabetic treatment includes oral agent (dual therapy). He is compliant with treatment all of the time. He is following a diabetic and generally healthy diet. When asked about meal planning, he reported none. He has not had a previous visit with a dietitian. He participates in exercise intermittently. There is no change in his home blood glucose trend. His breakfast blood glucose range is generally 110-130 mg/dl. An ACE inhibitor/angiotensin II receptor blocker is being taken. He does not see a podiatrist.Eye exam is current. no recent flareup of the gout, he is on allopurinol. Blood pressure remained normal and he is taking simvastatin, he says Rybelsus was not covered due to the cost did not take it, he is trying to improve his diet and trying to lose some weight  Review of Systems   Constitutional: Negative for activity change, appetite change, chills, fatigue, fever and unexpected weight change. HENT: Negative for congestion, ear discharge, ear pain, nosebleeds, postnasal drip, rhinorrhea, sinus pressure, sneezing, sore throat, trouble swallowing and voice change. Eyes: Negative for photophobia, pain, discharge, redness and itching. Respiratory: Negative for cough, chest tightness, shortness of breath and wheezing. Cardiovascular: Negative for chest pain, palpitations and leg swelling. Gastrointestinal: Negative for abdominal pain, constipation, diarrhea, nausea and vomiting. Endocrine: Negative for polyuria. Genitourinary: Negative for dysuria, frequency and urgency. Musculoskeletal: Negative for arthralgias, back pain, myalgias and neck pain. Skin: Negative for color change, pallor and rash.    Allergic/Immunologic: Negative for environmental allergies and food allergies. Neurological: Negative for dizziness, tremors, seizures, weakness, light-headedness and headaches. Hematological: Negative for adenopathy. Does not bruise/bleed easily. Psychiatric/Behavioral: Negative for behavioral problems. The patient is not nervous/anxious. Past Medical History:   Diagnosis Date   • Diabetes mellitus ([address])    • Gout    • Hypertension    • Leukocytosis     Onset:4/26/2012   • Onychomycosis     Onse:4/26/2012   • Other muscle spasm    • Renal calculi    • Shoulder joint pain      Past Surgical History:   Procedure Laterality Date   • CARPAL TUNNEL RELEASE Bilateral     Last Assessed:9/26/2014   • CYSTOSCOPY     • EYE SURGERY  09/04/2019    Detached retina   • HERNIA REPAIR      Femoral Hernia Repair Unilateral   • PA COLONOSCOPY FLX DX W/COLLJ SPEC WHEN PFRMD N/A 12/13/2018    Procedure: COLONOSCOPY;  Surgeon: Curlene Bence, MD;  Location: [facility];   Service: Gastroenterology   • PA CYSTO/URETERO W/LITHOTRIPSY &INDWELL STENT INSRT Left 8/11/2016    Procedure: CYSTOSCOPY URETEROSCOPY,RETROGRADE PYELOGRAM AND INSERTION STENT URETERAL;  Surgeon: Too Harris MD;  Location: [facility];  Service: Urology   • PA LITHOTRIPSY XTRCORP SHOCK WAVE Right 11/17/2016    Procedure: ESWL;  Surgeon: Too Harris MD;  Location: [facility];  Service: Urology     Family History   Problem Relation Age of Onset   • Coronary artery disease Mother    • Lung cancer Mother    • Heart disease Father    • Lung cancer Father    • Cancer Father    • Stomach cancer Brother      Social History     Socioeconomic History   • Marital status: Single     Spouse name: None   • Number of children: None   • Years of education: None   • Highest education level: None   Occupational History   • Occupation: RETIRED   Tobacco Use   • Smoking status: Never   • Smokeless tobacco: Never   Substance and Sexual Activity   • Alcohol use: Not Currently   • Drug use: No   • Sexual activity: None   Other Topics Concern   • None   Social History Narrative    Living independently alone     Social Determinants of Health     Financial Resource Strain: Not on file   Food Insecurity: Not on file   Transportation Needs: Not on file   Physical Activity: Not on file   Stress: Not on file   Social Connections: Not on file   Intimate Partner Violence: Not on file   Housing Stability: Not on file     Current Outpatient Medications on File Prior to Visit   Medication Sig   • allopurinol (ZYLOPRIM) 100 mg tablet Take 1 tablet (100 mg total) by mouth daily   • CINNAMON PO Take 1,000 mg by mouth daily   • glimepiride (AMARYL) 2 mg tablet Take 1 tablet (2 mg total) by mouth daily with breakfast   • lisinopril (ZESTRIL) 10 mg tablet TAKE 1 TABLET BY MOUTH EVERY DAY   • metFORMIN (GLUCOPHAGE) 850 mg tablet TAKE 1 TABLET BY MOUTH TWICE A DAY WITH MEALS   • Multiple Vitamin (multivitamin) capsule Take 1 capsule by mouth daily   • simvastatin (ZOCOR) 10 mg tablet TAKE 1 TABLET BY MOUTH EVERYDAY AT BEDTIME   • [DISCONTINUED] semaglutide (Rybelsus) 3 MG tablet Take 1 tablet (3 mg total) by mouth daily before breakfast     Allergies   Allergen Reactions   • Penicillins      Unknown as child     Immunization History   Administered Date(s) Administered   • COVID-19 PFIZER VACCINE 0.3 ML IM 04/01/2021, 04/22/2021   • INFLUENZA 12/02/2021, 12/12/2022   • Influenza, high dose seasonal 0.7 mL 12/07/2020, 12/02/2021, 12/12/2022   • Influenza, recombinant, quadrivalent,injectable, preservative free 09/20/2018, 12/03/2019   • Pneumococcal Conjugate 13-Valent 05/13/2016   • Pneumococcal Polysaccharide PPV23 05/13/2019   • Tdap 05/13/2016       Objective     /68   Pulse 64   Resp 16   Ht 5' 8" (1.727 m)   Wt 126 kg (278 lb)   SpO2 97%   BMI 42.27 kg/m²     Physical Exam  Vitals and nursing note reviewed. Constitutional:       Appearance: He is well-developed. He is obese. He is not ill-appearing. HENT:      Head: Normocephalic and atraumatic. Right Ear: External ear normal.      Left Ear: External ear normal.      Nose: Nose normal.      Mouth/Throat:      Mouth: Mucous membranes are dry. Eyes:      General: No scleral icterus. Right eye: No discharge. Left eye: No discharge. Conjunctiva/sclera: Conjunctivae normal.      Pupils: Pupils are equal, round, and reactive to light. Neck:      Thyroid: No thyromegaly. Trachea: No tracheal deviation. Cardiovascular:      Rate and Rhythm: Normal rate and regular rhythm. Heart sounds: Normal heart sounds. No murmur heard. Pulmonary:      Effort: Pulmonary effort is normal. No respiratory distress. Breath sounds: Normal breath sounds. No wheezing or rales. Abdominal:      General: Bowel sounds are normal. There is no distension. Palpations: Abdomen is soft. There is no mass. Tenderness: There is no abdominal tenderness. There is no rebound. Musculoskeletal:         General: Normal range of motion. Cervical back: Normal range of motion and neck supple. Right lower leg: No edema. Left lower leg: Edema present. Lymphadenopathy:      Cervical: No cervical adenopathy. Skin:     General: Skin is warm. Coloration: Skin is not jaundiced or pale. Findings: No erythema or rash. Neurological:      Mental Status: He is alert and oriented to person, place, and time. Cranial Nerves: No cranial nerve deficit. Deep Tendon Reflexes: Reflexes are normal and symmetric. Psychiatric:         Behavior: Behavior normal.         Thought Content:  Thought content normal.         Judgment: Judgment normal.       Rhoda Hartman MD

## 2023-08-24 NOTE — ASSESSMENT & PLAN NOTE
Lab Results   Component Value Date    HGBA1C 7.3 (H) 08/17/2023   rybelsus not coverd by insurance , cont same meds

## 2023-08-28 DIAGNOSIS — E78.2 MIXED HYPERLIPIDEMIA: ICD-10-CM

## 2023-08-28 RX ORDER — SIMVASTATIN 10 MG
TABLET ORAL
Qty: 90 TABLET | Refills: 0 | Status: SHIPPED | OUTPATIENT
Start: 2023-08-28

## 2023-11-27 DIAGNOSIS — E78.2 MIXED HYPERLIPIDEMIA: ICD-10-CM

## 2023-11-27 RX ORDER — SIMVASTATIN 10 MG
TABLET ORAL
Qty: 90 TABLET | Refills: 0 | Status: SHIPPED | OUTPATIENT
Start: 2023-11-27

## 2023-12-05 DIAGNOSIS — E78.2 MIXED HYPERLIPIDEMIA: ICD-10-CM

## 2023-12-05 DIAGNOSIS — E11.22 TYPE 2 DIABETES MELLITUS WITH STAGE 3A CHRONIC KIDNEY DISEASE, WITHOUT LONG-TERM CURRENT USE OF INSULIN (HCC): ICD-10-CM

## 2023-12-05 DIAGNOSIS — I10 BENIGN ESSENTIAL HYPERTENSION: ICD-10-CM

## 2023-12-05 DIAGNOSIS — N18.31 TYPE 2 DIABETES MELLITUS WITH STAGE 3A CHRONIC KIDNEY DISEASE, WITHOUT LONG-TERM CURRENT USE OF INSULIN (HCC): ICD-10-CM

## 2023-12-05 RX ORDER — SIMVASTATIN 10 MG
10 TABLET ORAL
Qty: 90 TABLET | Refills: 0 | OUTPATIENT
Start: 2023-12-05

## 2023-12-05 RX ORDER — GLIMEPIRIDE 2 MG/1
2 TABLET ORAL
Qty: 90 TABLET | Refills: 1 | Status: SHIPPED | OUTPATIENT
Start: 2023-12-05

## 2023-12-05 RX ORDER — LISINOPRIL 10 MG/1
10 TABLET ORAL DAILY
Qty: 90 TABLET | Refills: 0 | Status: CANCELLED | OUTPATIENT
Start: 2023-12-05

## 2023-12-05 NOTE — TELEPHONE ENCOUNTER
Reason for call:   [x] Refill   [] Prior Auth  [] Other:     Office:   [x] PCP/Provider - Coco Alex MD   [] Specialty/Provider -     Medication:     glimepiride (AMARYL)       simvastatin (ZOCOR)        Dose/Frequency:     2 mg, Oral, Daily with breakfast   10mg, Oral, Daily at bedtime    Quantity:   90  90    Pharmacy: [facility]/pharmacy #[id number]- [place] - [facility] [address]     Does the patient have enough for 3 days?    [x] Yes   [] No - Send as HP to POD

## 2023-12-21 ENCOUNTER — APPOINTMENT (OUTPATIENT)
Dept: LAB | Facility: CLINIC | Age: 68
End: 2023-12-21
Payer: MEDICARE

## 2023-12-21 DIAGNOSIS — M1A.9XX0 CHRONIC GOUT WITHOUT TOPHUS, UNSPECIFIED CAUSE, UNSPECIFIED SITE: ICD-10-CM

## 2023-12-21 DIAGNOSIS — E11.22 TYPE 2 DIABETES MELLITUS WITH STAGE 3A CHRONIC KIDNEY DISEASE, WITHOUT LONG-TERM CURRENT USE OF INSULIN (HCC): ICD-10-CM

## 2023-12-21 DIAGNOSIS — N18.31 TYPE 2 DIABETES MELLITUS WITH STAGE 3A CHRONIC KIDNEY DISEASE, WITHOUT LONG-TERM CURRENT USE OF INSULIN (HCC): ICD-10-CM

## 2023-12-21 LAB
ALBUMIN SERPL BCP-MCNC: 3.9 G/DL (ref 3.5–5)
ALP SERPL-CCNC: 73 U/L (ref 34–104)
ALT SERPL W P-5'-P-CCNC: 19 U/L (ref 7–52)
ANION GAP SERPL CALCULATED.3IONS-SCNC: 8 MMOL/L
AST SERPL W P-5'-P-CCNC: 16 U/L (ref 13–39)
BILIRUB SERPL-MCNC: 0.42 MG/DL (ref 0.2–1)
BUN SERPL-MCNC: 23 MG/DL (ref 5–25)
CALCIUM SERPL-MCNC: 8.7 MG/DL (ref 8.4–10.2)
CHLORIDE SERPL-SCNC: 101 MMOL/L (ref 96–108)
CHOLEST SERPL-MCNC: 122 MG/DL
CO2 SERPL-SCNC: 26 MMOL/L (ref 21–32)
CREAT SERPL-MCNC: 1.17 MG/DL (ref 0.6–1.3)
CREAT UR-MCNC: 71.2 MG/DL
ERYTHROCYTE [DISTWIDTH] IN BLOOD BY AUTOMATED COUNT: 14.3 % (ref 11.6–15.1)
EST. AVERAGE GLUCOSE BLD GHB EST-MCNC: 180 MG/DL
GFR SERPL CREATININE-BSD FRML MDRD: 63 ML/MIN/1.73SQ M
GLUCOSE P FAST SERPL-MCNC: 205 MG/DL (ref 65–99)
HBA1C MFR BLD: 7.9 %
HCT VFR BLD AUTO: 41.5 % (ref 36.5–49.3)
HDLC SERPL-MCNC: 41 MG/DL
HGB BLD-MCNC: 12.9 G/DL (ref 12–17)
LDLC SERPL CALC-MCNC: 47 MG/DL (ref 0–100)
MCH RBC QN AUTO: 28.6 PG (ref 26.8–34.3)
MCHC RBC AUTO-ENTMCNC: 31.1 G/DL (ref 31.4–37.4)
MCV RBC AUTO: 92 FL (ref 82–98)
MICROALBUMIN UR-MCNC: 12.1 MG/L
MICROALBUMIN/CREAT 24H UR: 17 MG/G CREATININE (ref 0–30)
PLATELET # BLD AUTO: 177 THOUSANDS/UL (ref 149–390)
PMV BLD AUTO: 11 FL (ref 8.9–12.7)
POTASSIUM SERPL-SCNC: 4.1 MMOL/L (ref 3.5–5.3)
PROT SERPL-MCNC: 6.7 G/DL (ref 6.4–8.4)
RBC # BLD AUTO: 4.51 MILLION/UL (ref 3.88–5.62)
SODIUM SERPL-SCNC: 135 MMOL/L (ref 135–147)
TRIGL SERPL-MCNC: 169 MG/DL
TSH SERPL DL<=0.05 MIU/L-ACNC: 2.98 UIU/ML (ref 0.45–4.5)
URATE SERPL-MCNC: 6.1 MG/DL (ref 3.5–8.5)
WBC # BLD AUTO: 9.41 THOUSAND/UL (ref 4.31–10.16)

## 2023-12-21 PROCEDURE — 36415 COLL VENOUS BLD VENIPUNCTURE: CPT

## 2023-12-21 PROCEDURE — 82570 ASSAY OF URINE CREATININE: CPT

## 2023-12-21 PROCEDURE — 84443 ASSAY THYROID STIM HORMONE: CPT

## 2023-12-21 PROCEDURE — 84550 ASSAY OF BLOOD/URIC ACID: CPT

## 2023-12-21 PROCEDURE — 80053 COMPREHEN METABOLIC PANEL: CPT

## 2023-12-21 PROCEDURE — 83036 HEMOGLOBIN GLYCOSYLATED A1C: CPT

## 2023-12-21 PROCEDURE — 80061 LIPID PANEL: CPT

## 2023-12-21 PROCEDURE — 82043 UR ALBUMIN QUANTITATIVE: CPT

## 2023-12-21 PROCEDURE — 85027 COMPLETE CBC AUTOMATED: CPT

## 2024-01-03 ENCOUNTER — RA CDI HCC (OUTPATIENT)
Dept: OTHER | Facility: HOSPITAL | Age: 69
End: 2024-01-03

## 2024-01-03 NOTE — PROGRESS NOTES
HCC coding opportunities          Chart Reviewed number of suggestions sent to Provider: 1     Patients Insurance     Medicare Insurance: Medicare        E11.65

## 2024-01-09 DIAGNOSIS — N20.0 NEPHROLITHIASIS: ICD-10-CM

## 2024-01-09 DIAGNOSIS — I10 BENIGN ESSENTIAL HYPERTENSION: ICD-10-CM

## 2024-01-09 RX ORDER — ALLOPURINOL 100 MG/1
100 TABLET ORAL DAILY
Qty: 90 TABLET | Refills: 1 | Status: SHIPPED | OUTPATIENT
Start: 2024-01-09

## 2024-01-09 RX ORDER — LISINOPRIL 10 MG/1
10 TABLET ORAL DAILY
Qty: 90 TABLET | Refills: 1 | Status: SHIPPED | OUTPATIENT
Start: 2024-01-09

## 2024-01-09 NOTE — TELEPHONE ENCOUNTER
Reason for call:   [x] Refill   [] Prior Auth  [] Other:     Office:   [x] PCP/Provider - Kathy   [] Specialty/Provider -     Medication:     Allopurinol 100mg - take 1 tablet daily - #90  Lisinopril 10mg - take 1 tablet daily - #90     Pharmacy: [facility] #[id number]    Does the patient have enough for 3 days?   [] Yes   [x] No - Send as HP to POD

## 2024-01-10 ENCOUNTER — OFFICE VISIT (OUTPATIENT)
Dept: FAMILY MEDICINE CLINIC | Facility: CLINIC | Age: 69
End: 2024-01-10

## 2024-01-10 VITALS
HEIGHT: 68 IN | HEART RATE: 68 BPM | BODY MASS INDEX: 43.8 KG/M2 | SYSTOLIC BLOOD PRESSURE: 132 MMHG | WEIGHT: 289 LBS | DIASTOLIC BLOOD PRESSURE: 72 MMHG | OXYGEN SATURATION: 97 % | RESPIRATION RATE: 18 BRPM

## 2024-01-10 DIAGNOSIS — E66.01 MORBID OBESITY WITH BMI OF 40.0-44.9, ADULT (HCC): ICD-10-CM

## 2024-01-10 DIAGNOSIS — Z23 NEEDS FLU SHOT: ICD-10-CM

## 2024-01-10 DIAGNOSIS — Z12.5 SCREENING PSA (PROSTATE SPECIFIC ANTIGEN): ICD-10-CM

## 2024-01-10 DIAGNOSIS — M1A.9XX0 CHRONIC GOUT WITHOUT TOPHUS, UNSPECIFIED CAUSE, UNSPECIFIED SITE: ICD-10-CM

## 2024-01-10 DIAGNOSIS — N18.31 TYPE 2 DIABETES MELLITUS WITH STAGE 3A CHRONIC KIDNEY DISEASE, WITHOUT LONG-TERM CURRENT USE OF INSULIN (HCC): Primary | ICD-10-CM

## 2024-01-10 DIAGNOSIS — E11.22 TYPE 2 DIABETES MELLITUS WITH STAGE 3A CHRONIC KIDNEY DISEASE, WITHOUT LONG-TERM CURRENT USE OF INSULIN (HCC): Primary | ICD-10-CM

## 2024-01-10 RX ORDER — GLIMEPIRIDE 4 MG/1
4 TABLET ORAL
Qty: 90 TABLET | Refills: 3 | Status: SHIPPED | OUTPATIENT
Start: 2024-01-10 | End: 2025-01-04

## 2024-01-10 NOTE — ASSESSMENT & PLAN NOTE
Increase the Amaryl dose 4 mg daily, and added Jardiance, his blood sugar is not well-controlled and he needs to work on low-carb diet and lose weight, he says if medicines are expensive he will not take them  Continue metformin  Lab Results   Component Value Date    HGBA1C 7.9 (H) 12/21/2023

## 2024-01-10 NOTE — ASSESSMENT & PLAN NOTE
Discussed about losing weight and start increasing activities so his blood sugar is better controlled

## 2024-01-10 NOTE — PATIENT INSTRUCTIONS
Medicare Preventive Visit Patient Instructions  Thank you for completing your Welcome to Medicare Visit or Medicare Annual Wellness Visit today. Your next wellness visit will be due in one year (1/10/2025).  The screening/preventive services that you may require over the next 5-10 years are detailed below. Some tests may not apply to you based off risk factors and/or age. Screening tests ordered at today's visit but not completed yet may show as past due. Also, please note that scanned in results may not display below.  Preventive Screenings:  Service Recommendations Previous Testing/Comments   Colorectal Cancer Screening  Colonoscopy    Fecal Occult Blood Test (FOBT)/Fecal Immunochemical Test (FIT)  Fecal DNA/Cologuard Test  Flexible Sigmoidoscopy Age: 45-75 years old   Colonoscopy: every 10 years (May be performed more frequently if at higher risk)  OR  FOBT/FIT: every 1 year  OR  Cologuard: every 3 years  OR  Sigmoidoscopy: every 5 years  Screening may be recommended earlier than age 45 if at higher risk for colorectal cancer. Also, an individualized decision between you and your healthcare provider will decide whether screening between the ages of 76-85 would be appropriate. Colonoscopy: 12/13/2018  FOBT/FIT: Not on file  Cologuard: Not on file  Sigmoidoscopy: Not on file    Screening Current     Prostate Cancer Screening Individualized decision between patient and health care provider in men between ages of 55-69   Medicare will cover every 12 months beginning on the day after your 50th birthday PSA: 2.2 ng/mL     Screening Current     Hepatitis C Screening Once for adults born between 1945 and 1965  More frequently in patients at high risk for Hepatitis C Hep C Antibody: 08/13/2019    Screening Current   Diabetes Screening 1-2 times per year if you're at risk for diabetes or have pre-diabetes Fasting glucose: 205 mg/dL (12/21/2023)  A1C: 7.9 % (12/21/2023)  Screening Not Indicated  History Diabetes    Cholesterol Screening Once every 5 years if you don't have a lipid disorder. May order more often based on risk factors. Lipid panel: 12/21/2023  Screening Not Indicated  History Lipid Disorder      Other Preventive Screenings Covered by Medicare:  Abdominal Aortic Aneurysm (AAA) Screening: covered once if your at risk. You're considered to be at risk if you have a family history of AAA or a male between the age of 65-75 who smoking at least 100 cigarettes in your lifetime.  Lung Cancer Screening: covers low dose CT scan once per year if you meet all of the following conditions: (1) Age 55-77; (2) No signs or symptoms of lung cancer; (3) Current smoker or have quit smoking within the last 15 years; (4) You have a tobacco smoking history of at least 20 pack years (packs per day x number of years you smoked); (5) You get a written order from a healthcare provider.  Glaucoma Screening: covered annually if you're considered high risk: (1) You have diabetes OR (2) Family history of glaucoma OR (3)  aged 50 and older OR (4)  American aged 65 and older  Osteoporosis Screening: covered every 2 years if you meet one of the following conditions: (1) Have a vertebral abnormality; (2) On glucocorticoid therapy for more than 3 months; (3) Have primary hyperparathyroidism; (4) On osteoporosis medications and need to assess response to drug therapy.  HIV Screening: covered annually if you're between the age of 15-65. Also covered annually if you are younger than 15 and older than 65 with risk factors for HIV infection. For pregnant patients, it is covered up to 3 times per pregnancy.    Immunizations:  Immunization Recommendations   Influenza Vaccine Annual influenza vaccination during flu season is recommended for all persons aged >= 6 months who do not have contraindications   Pneumococcal Vaccine   * Pneumococcal conjugate vaccine = PCV13 (Prevnar 13), PCV15 (Vaxneuvance), PCV20 (Prevnar 20)  *  Pneumococcal polysaccharide vaccine = PPSV23 (Pneumovax) Adults 19-65 yo with certain risk factors or if 65+ yo  If never received any pneumonia vaccine: recommend Prevnar 20 (PCV20)  Give PCV20 if previously received 1 dose of PCV13 or PPSV23   Hepatitis B Vaccine 3 dose series if at intermediate or high risk (ex: diabetes, end stage renal disease, liver disease)   Respiratory syncytial virus (RSV) Vaccine - COVERED BY MEDICARE PART D  * RSVPreF3 (Arexvy) CDC recommends that adults 60 years of age and older may receive a single dose of RSV vaccine using shared clinical decision-making (SCDM)   Tetanus (Td) Vaccine - COST NOT COVERED BY MEDICARE PART B Following completion of primary series, a booster dose should be given every 10 years to maintain immunity against tetanus. Td may also be given as tetanus wound prophylaxis.   Tdap Vaccine - COST NOT COVERED BY MEDICARE PART B Recommended at least once for all adults. For pregnant patients, recommended with each pregnancy.   Shingles Vaccine (Shingrix) - COST NOT COVERED BY MEDICARE PART B  2 shot series recommended in those 19 years and older who have or will have weakened immune systems or those 50 years and older     Health Maintenance Due:      Topic Date Due   • Colorectal Cancer Screening  12/13/2023   • Hepatitis C Screening  Completed     Immunizations Due:      Topic Date Due   • Influenza Vaccine (1) 09/01/2023   • COVID-19 Vaccine (3 - 2023-24 season) 09/01/2023     Advance Directives   What are advance directives?  Advance directives are legal documents that state your wishes and plans for medical care. These plans are made ahead of time in case you lose your ability to make decisions for yourself. Advance directives can apply to any medical decision, such as the treatments you want, and if you want to donate organs.   What are the types of advance directives?  There are many types of advance directives, and each state has rules about how to use them. You  may choose a combination of any of the following:  Living will:  This is a written record of the treatment you want. You can also choose which treatments you do not want, which to limit, and which to stop at a certain time. This includes surgery, medicine, IV fluid, and tube feedings.   Durable power of  for healthcare (DPAHC):  This is a written record that states who you want to make healthcare choices for you when you are unable to make them for yourself. This person, called a proxy, is usually a family member or a friend. You may choose more than 1 proxy.  Do not resuscitate (DNR) order:  A DNR order is used in case your heart stops beating or you stop breathing. It is a request not to have certain forms of treatment, such as CPR. A DNR order may be included in other types of advance directives.  Medical directive:  This covers the care that you want if you are in a coma, near death, or unable to make decisions for yourself. You can list the treatments you want for each condition. Treatment may include pain medicine, surgery, blood transfusions, dialysis, IV or tube feedings, and a ventilator (breathing machine).  Values history:  This document has questions about your views, beliefs, and how you feel and think about life. This information can help others choose the care that you would choose.  Why are advance directives important?  An advance directive helps you control your care. Although spoken wishes may be used, it is better to have your wishes written down. Spoken wishes can be misunderstood, or not followed. Treatments may be given even if you do not want them. An advance directive may make it easier for your family to make difficult choices about your care.   Weight Management   Why it is important to manage your weight:  Being overweight increases your risk of health conditions such as heart disease, high blood pressure, type 2 diabetes, and certain types of cancer. It can also increase your  risk for osteoarthritis, sleep apnea, and other respiratory problems. Aim for a slow, steady weight loss. Even a small amount of weight loss can lower your risk of health problems.  How to lose weight safely:  A safe and healthy way to lose weight is to eat fewer calories and get regular exercise. You can lose up about 1 pound a week by decreasing the number of calories you eat by 500 calories each day.   Healthy meal plan for weight management:  A healthy meal plan includes a variety of foods, contains fewer calories, and helps you stay healthy. A healthy meal plan includes the following:  Eat whole-grain foods more often.  A healthy meal plan should contain fiber. Fiber is the part of grains, fruits, and vegetables that is not broken down by your body. Whole-grain foods are healthy and provide extra fiber in your diet. Some examples of whole-grain foods are whole-wheat breads and pastas, oatmeal, brown rice, and bulgur.  Eat a variety of vegetables every day.  Include dark, leafy greens such as spinach, kale, katharine greens, and mustard greens. Eat yellow and orange vegetables such as carrots, sweet potatoes, and winter squash.   Eat a variety of fruits every day.  Choose fresh or canned fruit (canned in its own juice or light syrup) instead of juice. Fruit juice has very little or no fiber.  Eat low-fat dairy foods.  Drink fat-free (skim) milk or 1% milk. Eat fat-free yogurt and low-fat cottage cheese. Try low-fat cheeses such as mozzarella and other reduced-fat cheeses.  Choose meat and other protein foods that are low in fat.  Choose beans or other legumes such as split peas or lentils. Choose fish, skinless poultry (chicken or turkey), or lean cuts of red meat (beef or pork). Before you cook meat or poultry, cut off any visible fat.   Use less fat and oil.  Try baking foods instead of frying them. Add less fat, such as margarine, sour cream, regular salad dressing and mayonnaise to foods. Eat fewer high-fat  foods. Some examples of high-fat foods include french fries, doughnuts, ice cream, and cakes.  Eat fewer sweets.  Limit foods and drinks that are high in sugar. This includes candy, cookies, regular soda, and sweetened drinks.  Exercise:  Exercise at least 30 minutes per day on most days of the week. Some examples of exercise include walking, biking, dancing, and swimming. You can also fit in more physical activity by taking the stairs instead of the elevator or parking farther away from stores. Ask your healthcare provider about the best exercise plan for you.      © Copyright [employer] 2018 Information is for End User's use only and may not be sold, redistributed or otherwise used for commercial purposes. All illustrations and images included in CareNotes® are the copyrighted property of [employer] or [employer]    Type 2 Diabetes Management for Adults   AMBULATORY CARE:   Type 2 diabetes  is a disease that affects how your body uses glucose (sugar). Either your body cannot make enough insulin, or it cannot use the insulin correctly. It is important to keep diabetes controlled to prevent damage to your heart, blood vessels, and other organs. Management will help you feel well and enjoy your daily activities. Your diabetes care team providers can help you make a plan to fit diabetes care into your schedule. Your plan can change over time to fit your needs and your family's needs.       Have someone call your local emergency number (911 in the [place]) if:   You cannot be woken.    You have signs of diabetic ketoacidosis:     confusion, fatigue    vomiting    rapid heartbeat    fruity smelling breath    extreme thirst    dry mouth and skin    You have any of the following signs of a heart attack:      Squeezing, pressure, or pain in your chest    You may  also have any of the following:     Discomfort or pain in your back, neck, jaw, stomach, or arm    Shortness of breath    Nausea or  vomiting    Lightheadedness or a sudden cold sweat    You have any of the following signs of a stroke:      Numbness or drooping on one side of your face     Weakness in an arm or leg    Confusion or difficulty speaking    Dizziness, a severe headache, or vision loss    Call your doctor or diabetes care team provider if:   You have a sore or wound that will not heal.    You have a change in the amount you urinate.    Your blood sugar levels are higher than your target goals.    You often have lower blood sugar levels than your target goals.    Your skin is red, dry, warm, or swollen.    You have trouble coping with diabetes, or you feel anxious or depressed.    You have trouble following any part of your care plan, such as your meal plan.    You have questions or concerns about your condition or care.    What you need to know about high blood sugar levels:  High blood sugar levels may not cause any symptoms. You may feel more thirsty or urinate more often than usual. Over time, high blood sugar levels can damage your nerves, blood vessels, tissues, and organs. The following can increase your blood sugar levels:  Large meals or large amounts of carbohydrates at one time    Less physical activity    Stress    Illness    A lower dose of diabetes medicine or insulin, or a late dose    What you need to know about low blood sugar levels:  Symptoms include feeling shaky, dizzy, irritable, or confused. You can prevent symptoms by keeping your blood sugar levels from going too low.  Treat a low blood sugar level right away:      Drink 4 ounces of juice or have 1 tube of glucose gel.    Check your blood sugar level again 10 to 15 minutes later.    When the level goes back to normal, eat a meal or snack to prevent another decrease.       Keep glucose gel, raisins, or hard candy with you at all times to treat a low blood sugar level.     Your blood sugar level can get too low if you take diabetes medicine or insulin and do not  eat enough food.     If you use insulin, check your blood sugar level before you exercise.      If your blood sugar level is below 100 mg/dL, eat 4 crackers or 2 ounces of raisins, or drink 4 ounces of juice.    Check your level every 30 minutes if you exercise longer than 1 hour.    You may need a snack during or after exercise.    What you can do to manage your blood sugar levels:   Check your blood sugar levels as directed and as needed.  Several items are available to use to check your levels. You may need to check by testing a drop of blood in a glucose monitor. You may instead be given a continuous glucose monitoring (CGM) device. The device is worn at all times. The CGM checks your blood sugar level every 5 minutes. It sends results to an electronic device such as a smart phone. A CGM can be used with or without an insulin pump. You and your diabetes care team providers will decide on the best method for you. The goal for blood sugar levels before meals  is between 80 and 130 mg/dL and 2 hours after eating  is lower than 180 mg/dL.            Make healthy food choices.  Work with a dietitian to create a meal plan that works for you and your schedule. A dietitian can help you learn how to eat the right amount of carbohydrates (sugar and starchy foods) during your meals and snacks. Examples of carbohydrates are breads, cereals, rice, pasta, fruit, low-fat dairy, and sweets. Carbohydrates can raise your blood sugar level if you eat too many at one time.         Eat high-fiber foods as directed.  Fiber helps improve blood sugar levels. Fiber also lowers your risk for heart disease and other problems diabetes can cause. Examples of high-fiber foods include vegetables, whole-grain bread, and beans such as eubanks beans. Your dietitian can tell you how much fiber to have each day.         Get regular physical activity.  Physical activity can help you get to your target blood sugar level goal and manage your weight.  Get at least 150 minutes of moderate to vigorous aerobic physical activity each week. Resistance training, such as lifting weights, should be done 3 times each week. Do not miss more than 2 days of physical activity in a row. Do not sit longer than 30 minutes at a time. Your healthcare provider can help you create an activity plan. The plan can include the best activities for you and can help you build your strength and endurance.            Maintain a healthy weight.  Ask your team what a healthy weight is for you. A healthy weight can help you control diabetes and prevent heart disease. Ask your team to help you create a weight-loss plan, if needed. Even a loss of 3% to 7% of your excess body weight can help make a difference in managing diabetes. Your team will help you set a weight-loss goal, such as 10 to 15 pounds, or 5% of your extra weight. Together you and your team can set manageable weight-loss goals.    Take your diabetes medicine or insulin as directed.  You may need diabetes medicine, insulin, or both to help control your blood sugar levels. Your healthcare provider will teach you how and when to take your diabetes medicine or insulin. You will also be taught about side effects oral diabetes medicine can cause. Insulin may be injected or given through a pump or pen. You and your providers will decide on the best method for you:    An insulin pump  is an implanted device that gives your insulin 24 hours a day. An insulin pump prevents the need for multiple insulin injections in a day.         An insulin pen  is a device prefilled with the right amount of insulin.         You and your family members will be taught how to draw up and give insulin  if this is the best method for you. Your providers will also teach you how to dispose of needles and syringes.    You will learn how much insulin you need  and when to give it. You will be taught when not to give insulin. You will also be taught what to do if  your blood sugar level drops too low. This may happen if you take insulin and do not eat the right amount of carbohydrates.    More ways to manage type 2 diabetes:   Wear medical alert identification.  Wear medical alert jewelry or carry a card that says you have diabetes. Ask your provider where to get these items.         Do not smoke.  Nicotine and other chemicals in cigarettes and cigars can cause lung and blood vessel damage. It also makes it more difficult to manage your diabetes. Ask your provider for information if you currently smoke and need help to quit. Do not use e-cigarettes or smokeless tobacco in place of cigarettes or to help you quit. They still contain nicotine.    Check your feet each day for cuts, scratches, calluses, or other wounds.  Look for redness and swelling, and feel for warmth. Wear shoes that fit well. Check your shoes for rocks or other objects that can hurt your feet. Do not walk barefoot or wear shoes without socks. Wear cotton socks to help keep your feet dry.         Ask about vaccines you may need.  You have a higher risk for serious illness if you get the flu, pneumonia, COVID-19, or hepatitis. Ask your provider if you should get vaccines to prevent these or other diseases, and when to get the vaccines.    Talk to your provider if you become stressed about diabetes care.  Sometimes being able to fit diabetes care into your life can cause increased stress. The stress can cause you not to take care of yourself properly. Your provider can help by offering tips about self-care. A mental health provider can listen and offer help with self-care issues. Other types of counseling can help you make nutrition or physical activity changes.    Have your A1c checked as directed.  Your provider may check your A1c every 3 months, or 2 times each year if your diabetes is controlled. An A1c test shows the average amount of sugar in your blood over the past 2 to 3 months. Your provider will tell  you what your A1c level should be.    Have screening tests as directed.  Your provider may recommend screening for complications of diabetes and other conditions that may develop. Some screenings may begin right away and some may happen within the first 5 years of diagnosis:    Examples of diabetes complications  include kidney problems, high cholesterol, high blood pressure, blood vessel problems, eye problems, and sleep apnea.    You may be screened for a low vitamin B level  if you take oral diabetes medicine for a long time.    You may be screened for polycystic ovarian syndrome (PCOS)  if you are of childbearing age.    Follow up with your doctor or diabetes care team providers as directed:  You may need to have blood tests done before your follow-up visit. The test results will show if changes need to be made in your treatment or self-care. Talk to your provider if you cannot afford your medicine. Write down your questions so you remember to ask them during your visits.  © Copyright [employer] 2023 Information is for End User's use only and may not be sold, redistributed or otherwise used for commercial purposes.  The above information is an  only. It is not intended as medical advice for individual conditions or treatments. Talk to your doctor, nurse or pharmacist before following any medical regimen to see if it is safe and effective for you.

## 2024-01-10 NOTE — PROGRESS NOTES
Assessment/Plan:    Problem List Items Addressed This Visit        Endocrine    Type 2 diabetes mellitus with stage 3 chronic kidney disease, without long-term current use of insulin ([facility]) - Primary     Increase the Amaryl dose 4 mg daily, and added Jardiance, his blood sugar is not well-controlled and he needs to work on low-carb diet and lose weight, he says if medicines are expensive he will not take them  Continue metformin  Lab Results   Component Value Date    HGBA1C 7.9 (H) 12/21/2023          Relevant Medications    glimepiride (AMARYL) 4 mg tablet    Empagliflozin (JARDIANCE) 10 MG TABS tablet    Other Relevant Orders    Hemoglobin A1C    Comprehensive metabolic panel    TSH, 3rd generation with Free T4 reflex    Lipid Panel with Direct LDL reflex       Other    Gout     Uric acid level is normal continue allopurinol and no recent gout attack         Morbid obesity with BMI of 40.0-44.9, adult ([facility])     Discussed about losing weight and start increasing activities so his blood sugar is better controlled         Screening PSA (prostate specific antigen)     His brother who is 10 years younger he has prostate cancer, will check his PSA next time         Relevant Orders    PSA, Total Screen    Needs flu shot    Relevant Orders    influenza vaccine, high-dose, PF 0.7 mL (FLUZONE HIGH-DOSE) (Completed)         Chief Complaint   Patient presents with   • Follow-up     4 month   • Medicare Wellness Visit     Subsequent       Subjective:   Patient ID: Sreedhar Lagunas is a 68 y.o. male.    AWV ,and follow up , blood sugar not well controlled ,         Review of Systems   Constitutional:  Negative for activity change, appetite change, chills, fatigue, fever and unexpected weight change.   HENT:  Negative for congestion, ear discharge, ear pain, nosebleeds, postnasal drip, rhinorrhea, sinus pressure, sneezing, sore throat, trouble swallowing and voice change.    Eyes:  Negative for photophobia, pain, discharge,  redness and itching.   Respiratory:  Negative for cough, chest tightness, shortness of breath and wheezing.    Cardiovascular:  Negative for chest pain, palpitations and leg swelling.   Gastrointestinal:  Negative for abdominal pain, constipation, diarrhea, nausea and vomiting.   Endocrine: Negative for polyuria.   Genitourinary:  Negative for dysuria, frequency and urgency.   Musculoskeletal:  Negative for arthralgias, back pain, myalgias and neck pain.   Skin:  Negative for color change, pallor and rash.   Allergic/Immunologic: Negative for environmental allergies and food allergies.   Neurological:  Negative for dizziness, weakness, light-headedness and headaches.   Hematological:  Negative for adenopathy. Does not bruise/bleed easily.   Psychiatric/Behavioral:  Negative for behavioral problems and sleep disturbance. The patient is not nervous/anxious.         Objective:  Physical Exam  Vitals and nursing note reviewed.   Constitutional:       Appearance: Normal appearance. He is well-developed. He is obese.   HENT:      Head: Normocephalic and atraumatic.      Mouth/Throat:      Pharynx: No oropharyngeal exudate.   Eyes:      General: No scleral icterus.        Right eye: No discharge.         Left eye: No discharge.      Extraocular Movements: Extraocular movements intact.   Neck:      Thyroid: No thyromegaly.      Trachea: No tracheal deviation.   Cardiovascular:      Rate and Rhythm: Normal rate and regular rhythm.      Pulses: no weak pulses          Dorsalis pedis pulses are 2+ on the right side and 2+ on the left side.      Heart sounds: Normal heart sounds. No murmur heard.  Pulmonary:      Effort: Pulmonary effort is normal. No respiratory distress.      Breath sounds: Normal breath sounds. No wheezing or rales.   Abdominal:      General: Bowel sounds are normal. There is no distension.      Palpations: Abdomen is soft. There is no mass.      Tenderness: There is no abdominal tenderness. There is no  rebound.   Musculoskeletal:         General: Normal range of motion.      Cervical back: Normal range of motion and neck supple.      Right lower leg: Edema present.      Left lower leg: Edema present.        Feet:    Feet:      Right foot:      Skin integrity: Callus present. No ulcer, skin breakdown, erythema, warmth or dry skin.      Left foot:      Skin integrity: Callus present. No ulcer, skin breakdown, erythema, warmth or dry skin.   Lymphadenopathy:      Cervical: No cervical adenopathy.   Skin:     General: Skin is warm.      Coloration: Skin is not pale.      Findings: No erythema or rash.   Neurological:      Mental Status: He is alert and oriented to person, place, and time.      Cranial Nerves: No cranial nerve deficit.      Deep Tendon Reflexes: Reflexes are normal and symmetric.   Psychiatric:         Behavior: Behavior normal.         Thought Content: Thought content normal.         Judgment: Judgment normal.            Past Surgical History:   Procedure Laterality Date   • CARPAL TUNNEL RELEASE Bilateral     Last Assessed:9/26/2014   • CYSTOSCOPY     • EYE SURGERY  09/04/2019    Detached retina   • HERNIA REPAIR      Femoral Hernia Repair Unilateral   • [place] COLONOSCOPY FLX DX W/COLLJ SPEC WHEN PFRMD N/A 12/13/2018    Procedure: COLONOSCOPY;  Surgeon: Amy Verdugo MD;  [facility];  Service: Gastroenterology   • KS CYSTO/URETERO W/LITHOTRIPSY &INDWELL STENT INSRT Left 8/11/2016    Procedure: CYSTOSCOPY URETEROSCOPY,RETROGRADE PYELOGRAM AND INSERTION STENT URETERAL;  Surgeon: Sigifredo Lozada MD;  [facility];  Service: Urology   • KS LITHOTRIPSY XTRCORP SHOCK WAVE Right 11/17/2016    Procedure: ESWL;  Surgeon: Sigifredo Lozada MD;  [facility];  Service: Urology       Family History   Problem Relation Age of Onset   • Coronary artery disease Mother    • Lung cancer Mother    • Heart disease Father    • Lung cancer Father    • Cancer Father    • Stomach cancer Brother   "        Current Outpatient Medications:   •  Empagliflozin (JARDIANCE) 10 MG TABS tablet, Take 1 tablet (10 mg total) by mouth daily, Disp: 30 tablet, Rfl: 5  •  glimepiride (AMARYL) 4 mg tablet, Take 1 tablet (4 mg total) by mouth daily with breakfast, Disp: 90 tablet, Rfl: 3  •  allopurinol (ZYLOPRIM) 100 mg tablet, Take 1 tablet (100 mg total) by mouth daily, Disp: 90 tablet, Rfl: 1  •  CINNAMON PO, Take 1,000 mg by mouth daily, Disp: , Rfl:   •  lisinopril (ZESTRIL) 10 mg tablet, Take 1 tablet (10 mg total) by mouth daily, Disp: 90 tablet, Rfl: 1  •  metFORMIN (GLUCOPHAGE) 850 mg tablet, TAKE 1 TABLET BY MOUTH TWICE A DAY WITH FOOD, Disp: 180 tablet, Rfl: 1  •  Multiple Vitamin (multivitamin) capsule, Take 1 capsule by mouth daily, Disp: , Rfl:   •  simvastatin (ZOCOR) 10 mg tablet, TAKE 1 TABLET BY MOUTH EVERYDAY AT BEDTIME, Disp: 90 tablet, Rfl: 0    Allergies   Allergen Reactions   • Penicillins      Unknown as child       Vitals:    01/10/24 0817   BP: 132/72   Pulse: 68   Resp: 18   SpO2: 97%   Weight: 131 kg (289 lb)   Height: 5' 8\" (1.727 m)        Assessment and Plan:     Problem List Items Addressed This Visit        Endocrine    Type 2 diabetes mellitus with stage 3 chronic kidney disease, without long-term current use of insulin ([facility]) - Primary     Increase the Amaryl dose 4 mg daily, and added Jardiance, his blood sugar is not well-controlled and he needs to work on low-carb diet and lose weight, he says if medicines are expensive he will not take them  Continue metformin  Lab Results   Component Value Date    HGBA1C 7.9 (H) 12/21/2023          Relevant Medications    glimepiride (AMARYL) 4 mg tablet    Empagliflozin (JARDIANCE) 10 MG TABS tablet    Other Relevant Orders    Hemoglobin A1C    Comprehensive metabolic panel    TSH, 3rd generation with Free T4 reflex    Lipid Panel with Direct LDL reflex       Other    Gout     Uric acid level is normal continue allopurinol and no recent gout attack      "    Morbid obesity with BMI of 40.0-44.9, adult ([facility])     Discussed about losing weight and start increasing activities so his blood sugar is better controlled         Screening PSA (prostate specific antigen)     His brother who is 10 years younger he has prostate cancer, will check his PSA next time         Relevant Orders    PSA, Total Screen    Needs flu shot    Relevant Orders    influenza vaccine, high-dose, PF 0.7 mL (FLUZONE HIGH-DOSE) (Completed)     Patient's shoes and socks removed.    Right Foot/Ankle   Right Foot Inspection  Skin Exam: skin normal, callus and callus. Skin not intact, no dry skin, no warmth, no erythema, no maceration, no abnormal color, no pre-ulcer and no ulcer.     Toe Exam: ROM and strength within normal limits.     Sensory   Vibration: intact  Proprioception: intact  Monofilament testing: intact    Vascular  Capillary refills: < 3 seconds  The right DP pulse is 2+.     Left Foot/Ankle  Left Foot Inspection  Skin Exam: skin normal and callus. Skin not intact, no dry skin, no warmth, no erythema, no maceration, normal color, no pre-ulcer and no ulcer.     Toe Exam: ROM and strength within normal limits.     Sensory   Vibration: intact  Proprioception: intact  Monofilament testing: intact    Vascular  Capillary refills: < 3 seconds  The left DP pulse is 2+.     Assign Risk Category  No deformity present  No loss of protective sensation  No weak pulses  Risk: 2          BMI Counseling: Body mass index is 43.94 kg/m². The BMI is above normal. Nutrition recommendations include decreasing portion sizes, moderation in carbohydrate intake and reducing intake of cholesterol. Exercise recommendations include exercising 3-5 times per week and strength training exercises. Rationale for BMI follow-up plan is due to patient being overweight or obese.     Depression Screening and Follow-up Plan: Patient was screened for depression during today's encounter. They screened negative with a PHQ-2 score  of 0.      Preventive health issues were discussed with patient, and age appropriate screening tests were ordered as noted in patient's After Visit Summary.  Personalized health advice and appropriate referrals for health education or preventive services given if needed, as noted in patient's After Visit Summary.     History of Present Illness:     Patient presents for Medicare Annual Wellness visit    Patient Care Team:  Kamryn Chavez MD as PCP - General  Kamryn Chavez MD as PCP - PCP-[facility] MD Nabor Gonzalez MD Chatargy Kaza, MD (Gastroenterology)  Charlene Amaya MD (Nephrology)  Amy Verdugo MD as Endoscopist     Problem List:     Patient Active Problem List   Diagnosis   • Type 2 diabetes mellitus with stage 3 chronic kidney disease, without long-term current use of insulin ([facility])   • Gout   • Morbid obesity with BMI of 40.0-44.9, adult ([facility])   • Nephrolithiasis   • Benign essential hypertension   • Callus   • Hyperlipidemia   • Onychomycosis of toenail   • Plantar callus   • Tinea pedis of left foot   • Screening PSA (prostate specific antigen)   • Needs flu shot   • CKD (chronic kidney disease) stage 3, GFR 30-59 ml/min ([facility])      Past Medical and Surgical History:     Past Medical History:   Diagnosis Date   • Diabetes mellitus (HCC)    • Gout    • Hypertension    • Leukocytosis     Onset:4/26/2012   • Onychomycosis     Onse:4/26/2012   • Other muscle spasm    • Renal calculi    • Shoulder joint pain      Past Surgical History:   Procedure Laterality Date   • CARPAL TUNNEL RELEASE Bilateral     Last Assessed:9/26/2014   • CYSTOSCOPY     • EYE SURGERY  09/04/2019    Detached retina   • HERNIA REPAIR      Femoral Hernia Repair Unilateral   • [place] COLONOSCOPY FLX DX W/COLLJ SPEC WHEN PFRMD N/A 12/13/2018    Procedure: COLONOSCOPY;  Surgeon: Amy Verdugo MD;  [facility];  Service: Gastroenterology   • W[place] CYSTO/URETERO W/LITHOTRIPSY &INDWELL STENT  INSRT Left 8/11/2016    Procedure: CYSTOSCOPY URETEROSCOPY,RETROGRADE PYELOGRAM AND INSERTION STENT URETERAL;  Surgeon: Sigifredo Lozada MD;  [facility];  Service: Urology   • [place] LITHOTRIPSY XTRCORP SHOCK WAVE Right 11/17/2016    Procedure: ESWL;  Surgeon: Sigifredo Lozada MD;  [facility];  Service: Urology      Family History:     Family History   Problem Relation Age of Onset   • Coronary artery disease Mother    • Lung cancer Mother    • Heart disease Father    • Lung cancer Father    • Cancer Father    • Stomach cancer Brother       Social History:     Social History     Socioeconomic History   • Marital status: Single     Spouse name: None   • Number of children: None   • Years of education: None   • Highest education level: None   Occupational History   • Occupation: RETIRED   Tobacco Use   • Smoking status: Never   • Smokeless tobacco: Never   Substance and Sexual Activity   • Alcohol use: Not Currently   • Drug use: No   • Sexual activity: None   Other Topics Concern   • None   Social History Narrative    Living independently alone     Social Determinants of Health     Financial Resource Strain: Low Risk  (1/10/2024)    Overall Financial Resource Strain (CARDIA)    • Difficulty of Paying Living Expenses: Not very hard   Food Insecurity: Not on file   Transportation Needs: No Transportation Needs (1/10/2024)    PRAPARE - Transportation    • Lack of Transportation (Medical): No    • Lack of Transportation (Non-Medical): No   Physical Activity: Not on file   Stress: Not on file   Social Connections: Not on file   Intimate Partner Violence: Not on file   Housing Stability: Not on file      Medications and Allergies:     Current Outpatient Medications   Medication Sig Dispense Refill   • Empagliflozin (JARDIANCE) 10 MG TABS tablet Take 1 tablet (10 mg total) by mouth daily 30 tablet 5   • glimepiride (AMARYL) 4 mg tablet Take 1 tablet (4 mg total) by mouth daily with breakfast 90 tablet 3   •  "allopurinol (ZYLOPRIM) 100 mg tablet Take 1 tablet (100 mg total) by mouth daily 90 tablet 1   • CINNAMON PO Take 1,000 mg by mouth daily     • lisinopril (ZESTRIL) 10 mg tablet Take 1 tablet (10 mg total) by mouth daily 90 tablet 1   • metFORMIN (GLUCOPHAGE) 850 mg tablet TAKE 1 TABLET BY MOUTH TWICE A DAY WITH FOOD 180 tablet 1   • Multiple Vitamin (multivitamin) capsule Take 1 capsule by mouth daily     • simvastatin (ZOCOR) 10 mg tablet TAKE 1 TABLET BY MOUTH EVERYDAY AT BEDTIME 90 tablet 0     No current facility-administered medications for this visit.     Allergies   Allergen Reactions   • Penicillins      Unknown as child      Immunizations:     Immunization History   Administered Date(s) Administered   • COVID-19 PFIZER VACCINE 0.3 ML IM 04/01/2021, 04/22/2021   • INFLUENZA 12/02/2021, 12/12/2022   • Influenza, high dose seasonal 0.7 mL 12/07/2020, 12/02/2021, 12/12/2022, 01/10/2024   • Influenza, recombinant, quadrivalent,injectable, preservative free 09/20/2018, 12/03/2019   • Pneumococcal Conjugate 13-Valent 05/13/2016   • Pneumococcal Polysaccharide PPV23 05/13/2019   • Tdap 05/13/2016      Health Maintenance:         Topic Date Due   • Colorectal Cancer Screening  12/13/2023   • Hepatitis C Screening  Completed         Topic Date Due   • COVID-19 Vaccine (3 - 2023-24 season) 09/01/2023      Medicare Health Risk Assessment:     /72   Pulse 68   Resp 18   Ht 5' 8\" (1.727 m)   Wt 131 kg (289 lb)   SpO2 97%   BMI 43.94 kg/m²      Sreedhar is here for his Subsequent Wellness visit.     Health Risk Assessment:   Patient rates overall health as good. Patient feels that their physical health rating is same. Patient is satisfied with their life. Eyesight was rated as same. Hearing was rated as same. Patient feels that their emotional and mental health rating is same. Patients states they are never, rarely angry. Patient states they are never, rarely unusually tired/fatigued. Pain experienced in the " last 7 days has been none.     Depression Screening:   PHQ-2 Score: 0      Fall Risk Screening:   In the past year, patient has experienced: no history of falling in past year      Home Safety:  Patient does not have trouble with stairs inside or outside of their home. Patient has working smoke alarms and has no working carbon monoxide detector. Home safety hazards include: none.     Nutrition:   Current diet is Diabetic.     Medications:   Patient is currently taking over-the-counter supplements. OTC medications include: see medication list. Patient is able to manage medications.     Activities of Daily Living (ADLs)/Instrumental Activities of Daily Living (IADLs):   Walk and transfer into and out of bed and chair?: Yes  Dress and groom yourself?: Yes    Bathe or shower yourself?: Yes    Feed yourself? Yes  Do your laundry/housekeeping?: Yes  Manage your money, pay your bills and track your expenses?: Yes  Make your own meals?: Yes    Do your own shopping?: Yes    Previous Hospitalizations:   Any hospitalizations or ED visits within the last 12 months?: No      Advance Care Planning:   Living will: No    Durable POA for healthcare: No    Advanced directive: No      Cognitive Screening:   Provider or family/friend/caregiver concerned regarding cognition?: No    PREVENTIVE SCREENINGS      Cardiovascular Screening:    General: Screening Not Indicated and History Lipid Disorder      Diabetes Screening:     General: Screening Not Indicated and History Diabetes      Colorectal Cancer Screening:     General: Screening Current      Prostate Cancer Screening:    General: Screening Current      Abdominal Aortic Aneurysm (AAA) Screening:    Risk factors include: age between 65-74 yo        Lung Cancer Screening:     General: Screening Not Indicated      Hepatitis C Screening:    General: Screening Current    Screening, Brief Intervention, and Referral to Treatment (SBIRT)    Screening  Typical number of drinks in a day:  0  Typical number of drinks in a week: 0  Interpretation: Low risk drinking behavior.    AUDIT-C Screenin) How often did you have a drink containing alcohol in the past year? never  2) How many drinks did you have on a typical day when you were drinking in the past year? 0  3) How often did you have 6 or more drinks on one occasion in the past year? never    AUDIT-C Score: 0  Interpretation: Score 0-3 (male): Negative screen for alcohol misuse    Single Item Drug Screening:  How often have you used an illegal drug (including marijuana) or a prescription medication for non-medical reasons in the past year? never    Single Item Drug Screen Score: 0  Interpretation: Negative screen for possible drug use disorder    Other Counseling Topics:   Calcium and vitamin D intake.       Kamryn Chavez MD    Physical Exam  Vitals and nursing note reviewed.   Constitutional:       Appearance: Normal appearance. He is well-developed. He is obese.   HENT:      Head: Normocephalic and atraumatic.      Mouth/Throat:      Pharynx: No oropharyngeal exudate.   Eyes:      General: No scleral icterus.        Right eye: No discharge.         Left eye: No discharge.      Extraocular Movements: Extraocular movements intact.   Neck:      Thyroid: No thyromegaly.      Trachea: No tracheal deviation.   Cardiovascular:      Rate and Rhythm: Normal rate and regular rhythm.      Pulses: no weak pulses          Dorsalis pedis pulses are 2+ on the right side and 2+ on the left side.      Heart sounds: Normal heart sounds. No murmur heard.  Pulmonary:      Effort: Pulmonary effort is normal. No respiratory distress.      Breath sounds: Normal breath sounds. No wheezing or rales.   Abdominal:      General: Bowel sounds are normal. There is no distension.      Palpations: Abdomen is soft. There is no mass.      Tenderness: There is no abdominal tenderness. There is no rebound.   Musculoskeletal:         General: Normal range of motion.      Cervical  back: Normal range of motion and neck supple.      Right lower leg: Edema present.      Left lower leg: Edema present.        Feet:    Feet:      Right foot:      Skin integrity: Callus present. No ulcer, skin breakdown, erythema, warmth or dry skin.      Left foot:      Skin integrity: Callus present. No ulcer, skin breakdown, erythema, warmth or dry skin.   Lymphadenopathy:      Cervical: No cervical adenopathy.   Skin:     General: Skin is warm.      Coloration: Skin is not pale.      Findings: No erythema or rash.   Neurological:      Mental Status: He is alert and oriented to person, place, and time.      Cranial Nerves: No cranial nerve deficit.      Deep Tendon Reflexes: Reflexes are normal and symmetric.   Psychiatric:         Behavior: Behavior normal.         Thought Content: Thought content normal.         Judgment: Judgment normal.     fungal toe nails

## 2024-02-21 PROBLEM — Z12.5 SCREENING PSA (PROSTATE SPECIFIC ANTIGEN): Status: RESOLVED | Noted: 2018-09-20 | Resolved: 2024-02-21

## 2024-02-27 DIAGNOSIS — E78.2 MIXED HYPERLIPIDEMIA: ICD-10-CM

## 2024-02-27 RX ORDER — SIMVASTATIN 10 MG
TABLET ORAL
Qty: 90 TABLET | Refills: 1 | Status: SHIPPED | OUTPATIENT
Start: 2024-02-27

## 2024-03-11 NOTE — TELEPHONE ENCOUNTER
Patient called requesting refill for simvastatin . Patient made aware medication was refilled on 02/27/24 for 90 with 1  refills to [id number] pharmacy. Patient instructed to contact the pharmacy to obtain refills of medication. Patient verbalized understanding.

## 2024-04-08 DIAGNOSIS — E78.5 HYPERLIPIDEMIA, UNSPECIFIED HYPERLIPIDEMIA TYPE: ICD-10-CM

## 2024-04-08 NOTE — TELEPHONE ENCOUNTER
Reason for call:   [x] Refill   [] Prior Auth  [] Other:     Office:   [x] PCP/Provider - Kathy  [] Specialty/Provider -     Medication: metFORMIN (GLUCOPHAGE) 850 mg tablet     Dose/Frequency: 850 mg, Oral, 2 times daily with meals     Quantity: 180    Pharmacy: [facility]/pharm[facility] #[id number]  [place] - [address]     Does the patient have enough for 3 days?   [x] Yes   [] No - Send as HP to POD

## 2024-05-09 ENCOUNTER — APPOINTMENT (OUTPATIENT)
Dept: LAB | Facility: CLINIC | Age: 69
End: 2024-05-09
Payer: MEDICARE

## 2024-05-09 DIAGNOSIS — E11.22 TYPE 2 DIABETES MELLITUS WITH STAGE 3A CHRONIC KIDNEY DISEASE, WITHOUT LONG-TERM CURRENT USE OF INSULIN (HCC): ICD-10-CM

## 2024-05-09 DIAGNOSIS — Z12.5 SCREENING PSA (PROSTATE SPECIFIC ANTIGEN): ICD-10-CM

## 2024-05-09 DIAGNOSIS — N18.31 TYPE 2 DIABETES MELLITUS WITH STAGE 3A CHRONIC KIDNEY DISEASE, WITHOUT LONG-TERM CURRENT USE OF INSULIN (HCC): ICD-10-CM

## 2024-05-09 LAB
ALBUMIN SERPL BCP-MCNC: 3.8 G/DL (ref 3.5–5)
ALP SERPL-CCNC: 67 U/L (ref 34–104)
ALT SERPL W P-5'-P-CCNC: 20 U/L (ref 7–52)
ANION GAP SERPL CALCULATED.3IONS-SCNC: 7 MMOL/L (ref 4–13)
AST SERPL W P-5'-P-CCNC: 18 U/L (ref 13–39)
BILIRUB SERPL-MCNC: 0.44 MG/DL (ref 0.2–1)
BUN SERPL-MCNC: 21 MG/DL (ref 5–25)
CALCIUM SERPL-MCNC: 9.4 MG/DL (ref 8.4–10.2)
CHLORIDE SERPL-SCNC: 103 MMOL/L (ref 96–108)
CHOLEST SERPL-MCNC: 117 MG/DL
CO2 SERPL-SCNC: 26 MMOL/L (ref 21–32)
CREAT SERPL-MCNC: 1.23 MG/DL (ref 0.6–1.3)
EST. AVERAGE GLUCOSE BLD GHB EST-MCNC: 169 MG/DL
GFR SERPL CREATININE-BSD FRML MDRD: 59 ML/MIN/1.73SQ M
GLUCOSE P FAST SERPL-MCNC: 144 MG/DL (ref 65–99)
HBA1C MFR BLD: 7.5 %
HDLC SERPL-MCNC: 32 MG/DL
LDLC SERPL CALC-MCNC: 35 MG/DL (ref 0–100)
POTASSIUM SERPL-SCNC: 4.9 MMOL/L (ref 3.5–5.3)
PROT SERPL-MCNC: 6.7 G/DL (ref 6.4–8.4)
PSA SERPL-MCNC: 1.77 NG/ML (ref 0–4)
SODIUM SERPL-SCNC: 136 MMOL/L (ref 135–147)
TRIGL SERPL-MCNC: 252 MG/DL
TSH SERPL DL<=0.05 MIU/L-ACNC: 3.81 UIU/ML (ref 0.45–4.5)

## 2024-05-09 PROCEDURE — 84443 ASSAY THYROID STIM HORMONE: CPT

## 2024-05-09 PROCEDURE — 83036 HEMOGLOBIN GLYCOSYLATED A1C: CPT

## 2024-05-09 PROCEDURE — 80053 COMPREHEN METABOLIC PANEL: CPT

## 2024-05-09 PROCEDURE — G0103 PSA SCREENING: HCPCS

## 2024-05-09 PROCEDURE — 36415 COLL VENOUS BLD VENIPUNCTURE: CPT

## 2024-05-09 PROCEDURE — 80061 LIPID PANEL: CPT

## 2024-05-16 ENCOUNTER — OFFICE VISIT (OUTPATIENT)
Dept: FAMILY MEDICINE CLINIC | Facility: CLINIC | Age: 69
End: 2024-05-16
Payer: MEDICARE

## 2024-05-16 VITALS
HEIGHT: 68 IN | BODY MASS INDEX: 43.65 KG/M2 | HEART RATE: 66 BPM | OXYGEN SATURATION: 98 % | SYSTOLIC BLOOD PRESSURE: 130 MMHG | RESPIRATION RATE: 16 BRPM | WEIGHT: 288 LBS | DIASTOLIC BLOOD PRESSURE: 68 MMHG

## 2024-05-16 DIAGNOSIS — I10 BENIGN ESSENTIAL HYPERTENSION: ICD-10-CM

## 2024-05-16 DIAGNOSIS — N18.31 TYPE 2 DIABETES MELLITUS WITH STAGE 3A CHRONIC KIDNEY DISEASE, WITHOUT LONG-TERM CURRENT USE OF INSULIN (HCC): Primary | ICD-10-CM

## 2024-05-16 DIAGNOSIS — M1A.9XX0 CHRONIC GOUT WITHOUT TOPHUS, UNSPECIFIED CAUSE, UNSPECIFIED SITE: ICD-10-CM

## 2024-05-16 DIAGNOSIS — Z12.11 SCREEN FOR COLON CANCER: ICD-10-CM

## 2024-05-16 DIAGNOSIS — E11.22 TYPE 2 DIABETES MELLITUS WITH STAGE 3A CHRONIC KIDNEY DISEASE, WITHOUT LONG-TERM CURRENT USE OF INSULIN (HCC): Primary | ICD-10-CM

## 2024-05-16 DIAGNOSIS — Z23 ENCOUNTER FOR IMMUNIZATION: ICD-10-CM

## 2024-05-16 PROCEDURE — 99214 OFFICE O/P EST MOD 30 MIN: CPT | Performed by: FAMILY MEDICINE

## 2024-05-16 PROCEDURE — G0009 ADMIN PNEUMOCOCCAL VACCINE: HCPCS | Performed by: FAMILY MEDICINE

## 2024-05-16 PROCEDURE — 90677 PCV20 VACCINE IM: CPT | Performed by: FAMILY MEDICINE

## 2024-05-16 NOTE — ASSESSMENT & PLAN NOTE
Some improvement in A1c as glipizide dose was increased last time  He did not take the Jardiance as it was very expensive more than $500 so he is only taking metformin and glimepiride, I will send the prescription again for the Jardiance and linagliptin diet and will see if they are covered by insurance or the cost effective, the goal is if he takes those medications so we can stop the glipizide because the risk of hypoglycemia's are high   Lab Results   Component Value Date    HGBA1C 7.5 (H) 05/09/2024

## 2024-05-16 NOTE — PATIENT INSTRUCTIONS

## 2024-05-16 NOTE — PROGRESS NOTES
Ambulatory Visit  Name: Sreedhar aLgunas      : 1955      MRN: [id number]  Encounter Provider: Kamryn Chavez MD  Encounter Date: 2024   Encounter department: [facility]    Assessment & Plan   1. Type 2 diabetes mellitus with stage 3a chronic kidney disease, without long-term current use of insulin ([facility])  Assessment & Plan:  Some improvement in A1c as glipizide dose was increased last time  He did not take the Jardiance as it was very expensive more than $500 so he is only taking metformin and glimepiride, I will send the prescription again for the Jardiance and linagliptin diet and will see if they are covered by insurance or the cost effective, the goal is if he takes those medications so we can stop the glipizide because the risk of hypoglycemia's are high   Lab Results   Component Value Date    HGBA1C 7.5 (H) 2024     Orders:  -     linaGLIPtin 5 MG TABS; Take 5 mg by mouth daily with or without food  -     Empagliflozin (JARDIANCE) 10 MG TABS tablet; Take 1 tablet (10 mg total) by mouth daily  -     Albumin / creatinine urine ratio; Future; Expected date: 2024  -     Comprehensive metabolic panel; Future; Expected date: 2024  -     Hemoglobin A1C; Future; Expected date: 2024  -     CBC and Platelet; Future; Expected date: 2024  -     Lipid Panel with Direct LDL reflex; Future; Expected date: 2024  2. Encounter for immunization  -     Pneumococcal Conjugate Vaccine 20-valent (Pcv20)  3. Benign essential hypertension  4. Screen for colon cancer  -     Ambulatory Referral to Gastroenterology; Future  5. Chronic gout without tophus, unspecified cause, unspecified site  Assessment & Plan:  No recent gout attack, continue allopurinol and drinking more fluids  Orders:  -     Uric acid; Future; Expected date: 2024         History of Present Illness     Follow-up on diabetes, he says he did not take the Jardiance that the cost was very high, he  is taking glimepiride and the metformin, he sometimes check his blood sugar and he says occasionally it goes up to 70 if he is doing more activities but he does not check sugar frequently and he does not have any cold sweats.  He takes his blood pressure medicine,  Denies any dizziness chest pain.  He has seen the dietitian in the past and he knows what to eat,  No recent gout attack he and he is on allopurinol he says his brother is being treated for prostate cancer and he is younger than him      Review of Systems   Constitutional:  Negative for activity change, appetite change, chills, fatigue, fever and unexpected weight change.   HENT:  Negative for congestion, ear discharge, ear pain, nosebleeds, postnasal drip, rhinorrhea, sinus pressure, sneezing, sore throat, trouble swallowing and voice change.    Eyes:  Negative for photophobia, pain, discharge, redness and itching.   Respiratory:  Negative for cough, chest tightness, shortness of breath and wheezing.    Cardiovascular:  Negative for chest pain, palpitations and leg swelling.   Gastrointestinal:  Negative for abdominal pain, constipation, diarrhea, nausea and vomiting.   Endocrine: Negative for cold intolerance and polyuria.   Genitourinary:  Negative for dysuria, frequency and urgency.   Musculoskeletal:  Negative for arthralgias, back pain, myalgias and neck pain.   Skin:  Negative for color change, pallor and rash.   Allergic/Immunologic: Negative for environmental allergies and food allergies.   Neurological:  Negative for dizziness, weakness, light-headedness and headaches.   Hematological:  Negative for adenopathy. Does not bruise/bleed easily.   Psychiatric/Behavioral:  Negative for behavioral problems and sleep disturbance. The patient is not nervous/anxious.      Past Medical History:   Diagnosis Date   • Diabetes mellitus (HCC)    • Gout    • Hypertension    • Leukocytosis     Onset:4/26/2012   • Onychomycosis     Onse:4/26/2012   • Other muscle  spasm    • Renal calculi    • Shoulder joint pain      Past Surgical History:   Procedure Laterality Date   • CARPAL TUNNEL RELEASE Bilateral     Last Assessed:9/26/2014   • CYSTOSCOPY     • EYE SURGERY  09/04/2019    Detached retina   • HERNIA REPAIR      Femoral Hernia Repair Unilateral   • VT COLONOSCOPY FLX DX W/COLLJ SPEC WHEN PFRMD N/A 12/13/2018    Procedure: COLONOSCOPY;  Surgeon: Amy Verdugo MD;  Location: [facility];  Service: Gastroenterology   • VT CYSTO/URETERO W/LITHOTRIPSY &INDWELL STENT INSRT Left 8/11/2016    Procedure: CYSTOSCOPY URETEROSCOPY,RETROGRADE PYELOGRAM AND INSERTION STENT URETERAL;  Surgeon: Sigifredo Lozada MD;  Location: [facility];  Service: Urology   • VT LITHOTRIPSY XTRCORP SHOCK WAVE Right 11/17/2016    Procedure: ESWL;  Surgeon: Sigifredo Lozada MD;  Location: [facility];  Service: Urology     Family History   Problem Relation Age of Onset   • Coronary artery disease Mother    • Lung cancer Mother    • Heart disease Father    • Lung cancer Father    • Cancer Father    • Stomach cancer Brother    • Prostate cancer Brother      Social History     Tobacco Use   • Smoking status: Never   • Smokeless tobacco: Never   Substance and Sexual Activity   • Alcohol use: Not Currently   • Drug use: No   • Sexual activity: Not on file     Current Outpatient Medications on File Prior to Visit   Medication Sig   • allopurinol (ZYLOPRIM) 100 mg tablet Take 1 tablet (100 mg total) by mouth daily   • CINNAMON PO Take 1,000 mg by mouth daily   • glimepiride (AMARYL) 4 mg tablet Take 1 tablet (4 mg total) by mouth daily with breakfast   • lisinopril (ZESTRIL) 10 mg tablet Take 1 tablet (10 mg total) by mouth daily   • metFORMIN (GLUCOPHAGE) 850 mg tablet TAKE 1 TABLET BY MOUTH TWICE A DAY WITH FOOD   • Multiple Vitamin (multivitamin) capsule Take 1 capsule by mouth daily   • simvastatin (ZOCOR) 10 mg tablet TAKE 1 TABLET BY MOUTH EVERYDAY AT BEDTIME   • [DISCONTINUED] Empagliflozin (JARDIANCE) 10  "MG TABS tablet Take 1 tablet (10 mg total) by mouth daily     Allergies   Allergen Reactions   • Penicillins      Unknown as child     Immunization History   Administered Date(s) Administered   • COVID-19 PFIZER VACCINE 0.3 ML IM 04/01/2021, 04/22/2021   • INFLUENZA 12/02/2021, 12/12/2022   • Influenza, high dose seasonal 0.7 mL 12/07/2020, 12/02/2021, 12/12/2022, 01/10/2024   • Influenza, recombinant, quadrivalent,injectable, preservative free 09/20/2018, 12/03/2019   • Pneumococcal Conjugate 13-Valent 05/13/2016   • Pneumococcal Conjugate Vaccine 20-valent (Pcv20), Polysace 05/16/2024   • Pneumococcal Polysaccharide PPV23 05/13/2019   • Tdap 05/13/2016     Objective     /68   Pulse 66   Resp 16   Ht 5' 8\" (1.727 m)   Wt 131 kg (288 lb)   SpO2 98%   BMI 43.79 kg/m²     Physical Exam  Vitals and nursing note reviewed.   Constitutional:       General: He is not in acute distress.     Appearance: Normal appearance.   HENT:      Head: Normocephalic and atraumatic.      Mouth/Throat:      Pharynx: Oropharynx is clear. No oropharyngeal exudate or posterior oropharyngeal erythema.   Eyes:      Extraocular Movements: Extraocular movements intact.      Conjunctiva/sclera: Conjunctivae normal.   Neck:      Vascular: No carotid bruit.   Cardiovascular:      Rate and Rhythm: Normal rate and regular rhythm.      Heart sounds: No murmur heard.  Pulmonary:      Effort: Pulmonary effort is normal.      Breath sounds: Normal breath sounds. No wheezing or rales.   Abdominal:      General: Abdomen is flat. Bowel sounds are normal. There is no distension.      Palpations: Abdomen is soft. There is no mass.      Tenderness: There is no abdominal tenderness. There is no guarding.   Musculoskeletal:         General: No swelling, tenderness, deformity or signs of injury. Normal range of motion.      Cervical back: Normal range of motion and neck supple.      Right lower leg: No edema.      Left lower leg: No edema. "   Lymphadenopathy:      Cervical: No cervical adenopathy.   Skin:     Coloration: Skin is not jaundiced or pale.      Findings: No rash.   Neurological:      General: No focal deficit present.      Mental Status: He is alert and oriented to person, place, and time.      Motor: No weakness.   Psychiatric:         Mood and Affect: Mood normal.         Behavior: Behavior normal.         Thought Content: Thought content normal.         Judgment: Judgment normal.       Administrative Statements

## 2024-06-03 ENCOUNTER — TELEPHONE (OUTPATIENT)
Dept: GASTROENTEROLOGY | Facility: AMBULARY SURGERY CENTER | Age: 69
End: 2024-06-03

## 2024-06-03 ENCOUNTER — TELEPHONE (OUTPATIENT)
Age: 69
End: 2024-06-03

## 2024-06-03 NOTE — TELEPHONE ENCOUNTER
06/03/24  Screened by: Johnna Alejandra    Referring Provider Dr. Chavez    Pre- Screening: Yes    There is no height or weight on file to calculate BMI.  Has patient been referred for a routine screening Colonoscopy? yes  Is the patient between 45-75 years old? yes      Previous Colonoscopy yes   If yes:    Date: 12/13/2018    Facility:     Reason:     Does the patient want to see a Gastroenterologist prior to their procedure OR are they having any GI symptoms? no    Has the patient been hospitalized or had abdominal surgery in the past 6 months? no    Does the patient use supplemental oxygen? no    Does the patient take Coumadin, Lovenox, Plavix, Elliquis, Xarelto, or other blood thinning medication? no    Has the patient had a stroke, cardiac event, or stent placed in the past year? no

## 2024-06-03 NOTE — TELEPHONE ENCOUNTER
Scheduled date of EGD/colonoscopy (as of today): 08/23/2024  Physician performing EGD/colonoscopy: Dr. Regan  [facility] of [facility]colonoscopy: [facility]  Desired bowel prep reviewed with patient: ALFONSO  Please mail over to pt's address on file  Instructions reviewed with patient by: MICHAEL  Clearances: pt is diabetic, AM apt required  On jardiance    Mail GAIL/ELHAM and diabetic w/bowel prep instructions over to pt's address on file

## 2024-07-09 DIAGNOSIS — N20.0 NEPHROLITHIASIS: ICD-10-CM

## 2024-07-09 DIAGNOSIS — I10 BENIGN ESSENTIAL HYPERTENSION: ICD-10-CM

## 2024-07-09 RX ORDER — ALLOPURINOL 100 MG/1
100 TABLET ORAL DAILY
Qty: 100 TABLET | Refills: 1 | Status: SHIPPED | OUTPATIENT
Start: 2024-07-09

## 2024-07-09 RX ORDER — LISINOPRIL 10 MG/1
10 TABLET ORAL DAILY
Qty: 100 TABLET | Refills: 1 | Status: SHIPPED | OUTPATIENT
Start: 2024-07-09

## 2024-07-09 NOTE — TELEPHONE ENCOUNTER
Reason for call:   [x] Refill   [] Prior Auth  [] Other:     Office:   [x] PCP/Provider -   [] Specialty/Provider -     Medication: Allopurinol 100 mg 1 tablet daily   Lisinopril  10 mg 1 tablet daily       Pharmacy: [facility]     Does the patient have enough for 3 days?   [x] Yes   [] No - Send as HP to POD

## 2024-08-08 ENCOUNTER — TELEPHONE (OUTPATIENT)
Dept: GASTROENTEROLOGY | Facility: CLINIC | Age: 69
End: 2024-08-08

## 2024-08-08 NOTE — TELEPHONE ENCOUNTER
Spoke with patient confirming upcoming procedure on 8/23/24 with Dr. Regan. Pt confirmed and has the proc prep and diabetic med instructions.

## 2024-08-23 ENCOUNTER — ANESTHESIA (OUTPATIENT)
Dept: GASTROENTEROLOGY | Facility: HOSPITAL | Age: 69
End: 2024-08-23

## 2024-08-23 ENCOUNTER — ANESTHESIA EVENT (OUTPATIENT)
Dept: GASTROENTEROLOGY | Facility: HOSPITAL | Age: 69
End: 2024-08-23

## 2024-08-23 ENCOUNTER — HOSPITAL ENCOUNTER (OUTPATIENT)
Dept: GASTROENTEROLOGY | Facility: HOSPITAL | Age: 69
Setting detail: OUTPATIENT SURGERY
End: 2024-08-23
Attending: INTERNAL MEDICINE
Payer: MEDICARE

## 2024-08-23 VITALS
RESPIRATION RATE: 18 BRPM | TEMPERATURE: 98 F | DIASTOLIC BLOOD PRESSURE: 65 MMHG | WEIGHT: 278 LBS | OXYGEN SATURATION: 95 % | HEART RATE: 60 BPM | SYSTOLIC BLOOD PRESSURE: 143 MMHG | HEIGHT: 68 IN | BODY MASS INDEX: 42.13 KG/M2

## 2024-08-23 DIAGNOSIS — Z86.010 HISTORY OF COLON POLYPS: ICD-10-CM

## 2024-08-23 PROCEDURE — 88305 TISSUE EXAM BY PATHOLOGIST: CPT | Performed by: PATHOLOGY

## 2024-08-23 PROCEDURE — 45385 COLONOSCOPY W/LESION REMOVAL: CPT | Performed by: STUDENT IN AN ORGANIZED HEALTH CARE EDUCATION/TRAINING PROGRAM

## 2024-08-23 RX ORDER — PROPOFOL 10 MG/ML
INJECTION, EMULSION INTRAVENOUS AS NEEDED
Status: DISCONTINUED | OUTPATIENT
Start: 2024-08-23 | End: 2024-08-23

## 2024-08-23 RX ORDER — PROPOFOL 10 MG/ML
INJECTION, EMULSION INTRAVENOUS CONTINUOUS PRN
Status: DISCONTINUED | OUTPATIENT
Start: 2024-08-23 | End: 2024-08-23

## 2024-08-23 RX ORDER — SODIUM CHLORIDE, SODIUM LACTATE, POTASSIUM CHLORIDE, CALCIUM CHLORIDE 600; 310; 30; 20 MG/100ML; MG/100ML; MG/100ML; MG/100ML
INJECTION, SOLUTION INTRAVENOUS CONTINUOUS PRN
Status: DISCONTINUED | OUTPATIENT
Start: 2024-08-23 | End: 2024-08-23

## 2024-08-23 RX ORDER — SIMETHICONE 40MG/0.6ML
SUSPENSION, DROPS(FINAL DOSAGE FORM)(ML) ORAL AS NEEDED
Status: COMPLETED | OUTPATIENT
Start: 2024-08-23 | End: 2024-08-23

## 2024-08-23 RX ADMIN — Medication 40 MG: at 09:18

## 2024-08-23 RX ADMIN — SODIUM CHLORIDE, SODIUM LACTATE, POTASSIUM CHLORIDE, AND CALCIUM CHLORIDE: .6; .31; .03; .02 INJECTION, SOLUTION INTRAVENOUS at 09:13

## 2024-08-23 RX ADMIN — PROPOFOL 100 MCG/KG/MIN: 10 INJECTION, EMULSION INTRAVENOUS at 09:15

## 2024-08-23 RX ADMIN — PROPOFOL 80 MG: 10 INJECTION, EMULSION INTRAVENOUS at 09:15

## 2024-08-23 RX ADMIN — PROPOFOL 20 MG: 10 INJECTION, EMULSION INTRAVENOUS at 09:19

## 2024-08-23 RX ADMIN — SODIUM CHLORIDE, SODIUM LACTATE, POTASSIUM CHLORIDE, AND CALCIUM CHLORIDE: .6; .31; .03; .02 INJECTION, SOLUTION INTRAVENOUS at 08:47

## 2024-08-23 NOTE — ANESTHESIA POSTPROCEDURE EVALUATION
Post-Op Assessment Note    CV Status:  Stable    Pain management: adequate       Mental Status:  Alert and awake   Hydration Status:  Euvolemic   PONV Controlled:  Controlled   Airway Patency:  Patent     Post Op Vitals Reviewed: Yes    No anethesia notable event occurred.    Staff: CRNA               BP   119/58   Temp 97.7   Pulse 77   Resp 15   SpO2 96

## 2024-08-23 NOTE — ANESTHESIA PREPROCEDURE EVALUATION
Procedure:  COLONOSCOPY    Relevant Problems   CARDIO   (+) Benign essential hypertension   (+) Hyperlipidemia      ENDO   (+) Type 2 diabetes mellitus with stage 3 chronic kidney disease, without long-term current use of insulin ([facility])      /RENAL   (+) CKD (chronic kidney disease) stage 3, GFR 30-59 ml/min ([facility])   (+) Nephrolithiasis      Other   (+) Morbid obesity with BMI of 40.0-44.9, adult ([facility])        Physical Exam    Airway    Mallampati score: II  TM Distance: >3 FB  Neck ROM: full     Dental        Cardiovascular      Pulmonary      Other Findings        Anesthesia Plan  ASA Score- 3     Anesthesia Type- IV sedation with anesthesia with ASA Monitors.         Additional Monitors:     Airway Plan:            Plan Factors-Exercise tolerance (METS): >4 METS.    Chart reviewed.   Existing labs reviewed. Patient summary reviewed.                  Induction- intravenous.    Postoperative Plan-         Informed Consent- Anesthetic plan and risks discussed with patient.  I personally reviewed this patient with the CRNA. Discussed and agreed on the Anesthesia Plan with the CRNA..

## 2024-08-23 NOTE — H&P
History and Physical - [facility]  Sreedhar Lagunas 68 y.o. male MRN: [id number]                  HPI: Sreedhar Lagunas is a 68 y.o. year old male who presents for history of colon polyps.      REVIEW OF SYSTEMS: Per the HPI, and otherwise unremarkable.    Historical Information   Past Medical History:   Diagnosis Date    Diabetes mellitus (HCC)     Gout     Hypertension     Leukocytosis     Onset:4/26/2012    Onychomycosis     Onse:4/26/2012    Other muscle spasm     Renal calculi     Shoulder joint pain      Past Surgical History:   Procedure Laterality Date    CARPAL TUNNEL RELEASE Bilateral     Last Assessed:9/26/2014    CYSTOSCOPY      EYE SURGERY  09/04/2019    Detached retina    HERNIA REPAIR      Femoral Hernia Repair Unilateral    NC COLONOSCOPY FLX DX W/COLLJ SPEC WHEN PFRMD N/A 12/13/2018    Procedure: COLONOSCOPY;  Surgeon: Amy Verdugo MD;  [facility]: [facility];  Service: Gastroenterology    NC CYSTO/URETERO W/LITHOTRIPSY &INDWELL STENT INSRT Left 8/11/2016    Procedure: CYSTOSCOPY URETEROSCOPY,RETROGRADE PYELOGRAM AND INSERTION STENT URETERAL;  Surgeon: Sigifredo Lozada MD;  [facility];  Service: Urology    NC LITHOTRIPSY XTRCORP SHOCK WAVE Right 11/17/2016    Procedure: ESWL;  Surgeon: Sigifredo Lozada MD;  [facility];  Service: Urology     Social History   Social History     Substance and Sexual Activity   Alcohol Use Not Currently     Social History     Substance and Sexual Activity   Drug Use No     Social History     Tobacco Use   Smoking Status Never   Smokeless Tobacco Never     Family History   Problem Relation Age of Onset    Coronary artery disease Mother     Lung cancer Mother     Heart disease Father     Lung cancer Father     Cancer Father     Stomach cancer Brother     Prostate cancer Brother        Meds/Allergies       Current Outpatient Medications:     allopurinol (ZYLOPRIM) 100 mg tablet    CINNAMON PO    Empagliflozin (JARDIANCE) 10 MG  TABS tablet    glimepiride (AMARYL) 4 mg tablet    linaGLIPtin 5 MG TABS    lisinopril (ZESTRIL) 10 mg tablet    metFORMIN (GLUCOPHAGE) 850 mg tablet    Multiple Vitamin (multivitamin) capsule    simvastatin (ZOCOR) 10 mg tablet    Allergies   Allergen Reactions    Penicillins      Unknown as child       Objective     There were no vitals taken for this visit.      PHYSICAL EXAM    Gen: NAD  Head: NCAT  CV: RRR  CHEST: Clear  ABD: soft, NT/ND  EXT: no edema      ASSESSMENT/PLAN:  This is a 68 y.o. year old male here for colonoscopy, and he is stable and optimized for his procedure.

## 2024-08-28 PROCEDURE — 88305 TISSUE EXAM BY PATHOLOGIST: CPT | Performed by: PATHOLOGY

## 2024-09-02 DIAGNOSIS — E78.2 MIXED HYPERLIPIDEMIA: ICD-10-CM

## 2024-09-03 ENCOUNTER — TELEPHONE (OUTPATIENT)
Age: 69
End: 2024-09-03

## 2024-09-03 RX ORDER — SIMVASTATIN 10 MG
TABLET ORAL
Qty: 90 TABLET | Refills: 1 | Status: SHIPPED | OUTPATIENT
Start: 2024-09-03

## 2024-09-03 NOTE — TELEPHONE ENCOUNTER
LVM for patient to inform him that polyps removed during colonoscopy are completely benign and it is recommended he repeat colon in 10 years.

## 2024-09-06 ENCOUNTER — TELEPHONE (OUTPATIENT)
Age: 69
End: 2024-09-06

## 2024-09-06 NOTE — TELEPHONE ENCOUNTER
Patient called requesting refill for zocor. Patient made aware medication was refilled on 9/3/24 for 90 with 1 refills to [facility] pharmacy. Patient instructed to contact the pharmacy to obtain refills of medication. Patient verbalized understanding.

## 2024-09-13 ENCOUNTER — APPOINTMENT (OUTPATIENT)
Dept: LAB | Facility: CLINIC | Age: 69
End: 2024-09-13
Payer: MEDICARE

## 2024-09-13 DIAGNOSIS — M1A.9XX0 CHRONIC GOUT WITHOUT TOPHUS, UNSPECIFIED CAUSE, UNSPECIFIED SITE: ICD-10-CM

## 2024-09-13 DIAGNOSIS — N18.31 TYPE 2 DIABETES MELLITUS WITH STAGE 3A CHRONIC KIDNEY DISEASE, WITHOUT LONG-TERM CURRENT USE OF INSULIN (HCC): ICD-10-CM

## 2024-09-13 DIAGNOSIS — E11.22 TYPE 2 DIABETES MELLITUS WITH STAGE 3A CHRONIC KIDNEY DISEASE, WITHOUT LONG-TERM CURRENT USE OF INSULIN (HCC): ICD-10-CM

## 2024-09-13 LAB
ALBUMIN SERPL BCG-MCNC: 3.9 G/DL (ref 3.5–5)
ALP SERPL-CCNC: 71 U/L (ref 34–104)
ALT SERPL W P-5'-P-CCNC: 21 U/L (ref 7–52)
ANION GAP SERPL CALCULATED.3IONS-SCNC: 5 MMOL/L (ref 4–13)
AST SERPL W P-5'-P-CCNC: 17 U/L (ref 13–39)
BILIRUB SERPL-MCNC: 0.44 MG/DL (ref 0.2–1)
BUN SERPL-MCNC: 30 MG/DL (ref 5–25)
CALCIUM SERPL-MCNC: 9.3 MG/DL (ref 8.4–10.2)
CHLORIDE SERPL-SCNC: 104 MMOL/L (ref 96–108)
CHOLEST SERPL-MCNC: 126 MG/DL
CO2 SERPL-SCNC: 27 MMOL/L (ref 21–32)
CREAT SERPL-MCNC: 1.34 MG/DL (ref 0.6–1.3)
CREAT UR-MCNC: 88.2 MG/DL
ERYTHROCYTE [DISTWIDTH] IN BLOOD BY AUTOMATED COUNT: 14.5 % (ref 11.6–15.1)
EST. AVERAGE GLUCOSE BLD GHB EST-MCNC: 163 MG/DL
GFR SERPL CREATININE-BSD FRML MDRD: 54 ML/MIN/1.73SQ M
GLUCOSE P FAST SERPL-MCNC: 156 MG/DL (ref 65–99)
HBA1C MFR BLD: 7.3 %
HCT VFR BLD AUTO: 41.8 % (ref 36.5–49.3)
HDLC SERPL-MCNC: 40 MG/DL
HGB BLD-MCNC: 13.3 G/DL (ref 12–17)
LDLC SERPL CALC-MCNC: 53 MG/DL (ref 0–100)
MCH RBC QN AUTO: 29.2 PG (ref 26.8–34.3)
MCHC RBC AUTO-ENTMCNC: 31.8 G/DL (ref 31.4–37.4)
MCV RBC AUTO: 92 FL (ref 82–98)
MICROALBUMIN UR-MCNC: <7 MG/L
PLATELET # BLD AUTO: 195 THOUSANDS/UL (ref 149–390)
PMV BLD AUTO: 11.7 FL (ref 8.9–12.7)
POTASSIUM SERPL-SCNC: 4.9 MMOL/L (ref 3.5–5.3)
PROT SERPL-MCNC: 6.8 G/DL (ref 6.4–8.4)
RBC # BLD AUTO: 4.56 MILLION/UL (ref 3.88–5.62)
SODIUM SERPL-SCNC: 136 MMOL/L (ref 135–147)
TRIGL SERPL-MCNC: 163 MG/DL
URATE SERPL-MCNC: 7.3 MG/DL (ref 3.5–8.5)
WBC # BLD AUTO: 10.98 THOUSAND/UL (ref 4.31–10.16)

## 2024-09-13 PROCEDURE — 82570 ASSAY OF URINE CREATININE: CPT

## 2024-09-13 PROCEDURE — 83036 HEMOGLOBIN GLYCOSYLATED A1C: CPT

## 2024-09-13 PROCEDURE — 80061 LIPID PANEL: CPT

## 2024-09-13 PROCEDURE — 85027 COMPLETE CBC AUTOMATED: CPT

## 2024-09-13 PROCEDURE — 36415 COLL VENOUS BLD VENIPUNCTURE: CPT

## 2024-09-13 PROCEDURE — 82043 UR ALBUMIN QUANTITATIVE: CPT

## 2024-09-13 PROCEDURE — 84550 ASSAY OF BLOOD/URIC ACID: CPT

## 2024-09-13 PROCEDURE — 80053 COMPREHEN METABOLIC PANEL: CPT

## 2024-09-24 ENCOUNTER — OFFICE VISIT (OUTPATIENT)
Dept: FAMILY MEDICINE CLINIC | Facility: CLINIC | Age: 69
End: 2024-09-24
Payer: MEDICARE

## 2024-09-24 VITALS
RESPIRATION RATE: 16 BRPM | WEIGHT: 279 LBS | HEART RATE: 68 BPM | SYSTOLIC BLOOD PRESSURE: 122 MMHG | HEIGHT: 68 IN | DIASTOLIC BLOOD PRESSURE: 70 MMHG | OXYGEN SATURATION: 97 % | BODY MASS INDEX: 42.28 KG/M2

## 2024-09-24 DIAGNOSIS — N18.31 TYPE 2 DIABETES MELLITUS WITH STAGE 3A CHRONIC KIDNEY DISEASE, WITHOUT LONG-TERM CURRENT USE OF INSULIN (HCC): Primary | ICD-10-CM

## 2024-09-24 DIAGNOSIS — Z23 ENCOUNTER FOR IMMUNIZATION: ICD-10-CM

## 2024-09-24 DIAGNOSIS — I10 BENIGN ESSENTIAL HYPERTENSION: ICD-10-CM

## 2024-09-24 DIAGNOSIS — E11.22 TYPE 2 DIABETES MELLITUS WITH STAGE 3A CHRONIC KIDNEY DISEASE, WITHOUT LONG-TERM CURRENT USE OF INSULIN (HCC): Primary | ICD-10-CM

## 2024-09-24 DIAGNOSIS — N18.31 STAGE 3A CHRONIC KIDNEY DISEASE (HCC): ICD-10-CM

## 2024-09-24 PROCEDURE — G0008 ADMIN INFLUENZA VIRUS VAC: HCPCS | Performed by: FAMILY MEDICINE

## 2024-09-24 PROCEDURE — 99214 OFFICE O/P EST MOD 30 MIN: CPT | Performed by: FAMILY MEDICINE

## 2024-09-24 PROCEDURE — 90662 IIV NO PRSV INCREASED AG IM: CPT | Performed by: FAMILY MEDICINE

## 2024-09-24 NOTE — PATIENT INSTRUCTIONS
"Patient Education     Type 2 diabetes   The Basics   Written by the doctors and editors at [facility]   What is type 2 diabetes? -- This is a disorder that disrupts the way the body uses sugar. It is sometimes called type 2 diabetes mellitus.  All of the cells in the body need sugar to work normally. Sugar gets into the cells with the help of a hormone called insulin. Insulin is made by the pancreas, an organ in the belly. If there is not enough insulin, or if cells in the body don't respond normally to insulin, sugar builds up in the blood. That is what happens to people with diabetes.  There are 2 different types of diabetes:   In type 1 diabetes, the pancreas makes little or no insulin.   In type 2 diabetes, the pancreas still makes some insulin, but the cells in the body stop responding normally. Eventually, the pancreas cannot make enough insulin to keep up.  Having excess body weight or obesity increases a person's risk of developing type 2 diabetes. But people without excess body weight can get diabetes, too.  What are the symptoms of type 2 diabetes? -- Type 2 diabetes usually causes no symptoms. When symptoms do happen, they include:   Needing to urinate often   Intense thirst   Blurry vision  Can diabetes lead to other health problems? -- Yes. Type 2 diabetes might not make you feel sick. But if it is not managed, it can lead to serious problems over time, such as:   Heart attacks   Strokes   Kidney disease   Vision problems (or even blindness)   Pain or loss of feeling in the hands and feet   Needing to have fingers, toes, or other body parts removed (amputated)  How do I know if I have type 2 diabetes? -- Your doctor or nurse can do a blood test. There are 2 tests that can be used for this. Both involve measuring the amount of sugar in your blood, called your \"blood sugar\" or \"blood glucose\":   One of the tests measures your blood sugar at the time the blood sample is taken. This test is done in the " "morning. You can't eat or drink anything except water for at least 8 hours before the test.   The other test shows what your average blood sugar has been for the past 2 to 3 months. This blood test is called \"hemoglobin A1C\" or just \"A1C.\" It can be checked at any time of the day, even if you have recently eaten.  How is type 2 diabetes treated? -- The goals of treatment are to manage your blood sugar and lower the risk of future problems that can happen in people with diabetes.  Treatment might include:   Lifestyle changes - This is an important part of managing diabetes. It includes eating healthy foods and getting plenty of physical activity.   Medicines - There are a few medicines that help lower blood sugar. Some people need to take pills that help the body make more insulin or that help insulin do its job. Others need insulin shots.  Depending on what medicines you take, you might need to check your blood sugar regularly at home. But not everyone with type 2 diabetes needs to do this. Your doctor or nurse will tell you if you should be checking your blood sugar, and when and how to do this.  Sometimes, people with type 2 diabetes also need medicines to help prevent problems caused by the disease. For instance, medicines used to lower blood pressure can reduce the chances of a heart attack or stroke.   General medical care - It's also important to take care of other areas of your health. This includes watching your blood pressure and cholesterol levels. You should also get certain vaccines, such as vaccines to protect against the flu and coronavirus disease 2019 (\"COVID-19\"). Some people also need a vaccine to prevent pneumonia.  Can type 2 diabetes be prevented? -- Yes. To lower your chances of getting type 2 diabetes, the most important thing you can do is eat a healthy diet and get plenty of physical activity. This can help you lose weight if you are overweight. But eating well and being active are also good " for your overall health. Even gentle activity, like walking, has benefits.  If you smoke, quitting can also lower your risk of type 2 diabetes. Quitting smoking can be difficult, but your doctor or nurse can help.  All topics are updated as new evidence becomes available and our peer review process is complete.  This topic retrieved from [employer] on: Apr 24, 2024.  Topic 88470 Version 23.0  Release: 32.3.2 - C32.113  © 2024 UpToDate, Inc. and/or its affiliates. All rights reserved.  Consumer Information Use and Disclaimer   Disclaimer: This generalized information is a limited summary of diagnosis, treatment, and/or medication information. It is not meant to be comprehensive and should be used as a tool to help the user understand and/or assess potential diagnostic and treatment options. It does NOT include all information about conditions, treatments, medications, side effects, or risks that may apply to a specific patient. It is not intended to be medical advice or a substitute for the medical advice, diagnosis, or treatment of a health care provider based on the health care provider's examination and assessment of a patient's specific and unique circumstances. Patients must speak with a health care provider for complete information about their health, medical questions, and treatment options, including any risks or benefits regarding use of medications. This information does not endorse any treatments or medications as safe, effective, or approved for treating a specific patient. UpToDate, Inc. and its affiliates disclaim any warranty or liability relating to this information or the use thereof.The use of this information is governed by the Terms of Use, available at https://www.wolterskluwer.com/en/know/clinical-effectiveness-terms. 2024© UpToDate, Inc. and its affiliates and/or licensors. All rights reserved.  Copyright   © 2024 UpToDate, Inc. and/or its affiliates. All rights reserved.

## 2024-09-24 NOTE — ASSESSMENT & PLAN NOTE
Lab Results   Component Value Date    HGBA1C 7.3 (H) 09/13/2024   Slight improvement in A1c, continue low-carb diet and added Rybelsus

## 2024-09-24 NOTE — ASSESSMENT & PLAN NOTE
Lab Results   Component Value Date    HGBA1C 7.3 (H) 09/13/2024   Discussed about injectables medications for diabetes, he is not agreeable, Rybelsus prescription is sent if is covered by insurance he can add that along with his rest of the medications  Advised to see the nephrologist as creatinine has gone up slightly and encouraged to drink more water    Orders:    IRIS Diabetic eye exam    Ambulatory Referral to Nephrology; Future    semaglutide (Rybelsus) 3 MG tablet; Take 1 tablet (3 mg total) by mouth daily Take on an empty stomach with 4 oz water, and wait 30 minutes before eating    semaglutide (Rybelsus) 7 MG tablet; Take 1 tablet (7 mg total) by mouth daily Take on an empty stomach with 4 oz water, and wait 30 minutes before eating Do not start before October 24, 2024.    Comprehensive metabolic panel; Future    Hemoglobin A1C; Future    CBC and Platelet; Future    TSH, 3rd generation with Free T4 reflex; Future    Lipid Panel with Direct LDL reflex; Future

## 2024-09-24 NOTE — ASSESSMENT & PLAN NOTE
Lab Results   Component Value Date    EGFR 54 09/13/2024    EGFR 59 05/09/2024    EGFR 63 12/21/2023    CREATININE 1.34 (H) 09/13/2024    CREATININE 1.23 05/09/2024    CREATININE 1.17 12/21/2023       Orders:    Ambulatory Referral to Nephrology; Future

## 2024-09-24 NOTE — PROGRESS NOTES
Ambulatory Visit  Name: Sreedhar Lagunas      : 1955      MRN: [id number]  Encounter Provider: Kamryn Chavez MD  Encounter Date: 2024   Encounter department: [facility]    Assessment & Plan  Encounter for immunization    Orders:    influenza vaccine, high-dose, PF 0.5 mL (Fluzone High Dose)    Type 2 diabetes mellitus with stage 3a chronic kidney disease, without long-term current use of insulin (HCC)    Lab Results   Component Value Date    HGBA1C 7.3 (H) 2024   Discussed about injectables medications for diabetes, he is not agreeable, Rybelsus prescription is sent if is covered by insurance he can add that along with his rest of the medications  Advised to see the nephrologist as creatinine has gone up slightly and encouraged to drink more water    Orders:    IRIS Diabetic eye exam    Ambulatory Referral to Nephrology; Future    semaglutide (Rybelsus) 3 MG tablet; Take 1 tablet (3 mg total) by mouth daily Take on an empty stomach with 4 oz water, and wait 30 minutes before eating    semaglutide (Rybelsus) 7 MG tablet; Take 1 tablet (7 mg total) by mouth daily Take on an empty stomach with 4 oz water, and wait 30 minutes before eating Do not start before 2024.    Comprehensive metabolic panel; Future    Hemoglobin A1C; Future    CBC and Platelet; Future    TSH, 3rd generation with Free T4 reflex; Future    Lipid Panel with Direct LDL reflex; Future    Stage 3a chronic kidney disease (HCC)  Lab Results   Component Value Date    EGFR 54 2024    EGFR 59 2024    EGFR 63 2023    CREATININE 1.34 (H) 2024    CREATININE 1.23 2024    CREATININE 1.17 2023       Orders:    Ambulatory Referral to Nephrology; Future    Benign essential hypertension  Continue lisinopril, blood pressure is stable              History of Present Illness     Follow-up on diabetes, labs are done, he has lost some weight, try to eat better, and do routine  walking, denies any chest pain, he is still taking same medications, he says most of the medicines which are prescribed in the past they are very expensive .  He is interested in flu vaccine    Had recent colonoscopy  Review of Systems   Constitutional:  Negative for activity change, appetite change, chills, fatigue, fever and unexpected weight change.   HENT:  Negative for congestion, ear discharge, ear pain, nosebleeds, postnasal drip, rhinorrhea, sinus pressure, sneezing, sore throat, trouble swallowing and voice change.    Eyes:  Negative for photophobia, pain, discharge, redness and itching.   Respiratory:  Negative for cough, chest tightness, shortness of breath and wheezing.    Cardiovascular:  Negative for chest pain, palpitations and leg swelling.   Gastrointestinal:  Negative for abdominal pain, constipation, diarrhea, nausea and vomiting.   Endocrine: Negative for polyuria.   Genitourinary:  Negative for dysuria, frequency and urgency.   Musculoskeletal:  Negative for arthralgias, back pain, myalgias and neck pain.   Skin:  Negative for color change, pallor and rash.   Allergic/Immunologic: Negative for environmental allergies and food allergies.   Neurological:  Negative for dizziness, weakness, light-headedness and headaches.   Hematological:  Negative for adenopathy. Does not bruise/bleed easily.   Psychiatric/Behavioral:  Negative for behavioral problems. The patient is not nervous/anxious.      Past Medical History:   Diagnosis Date    Diabetes mellitus (HCC)     Gout     Hypertension     Leukocytosis     Onset:4/26/2012    Onychomycosis     Onse:4/26/2012    Other muscle spasm     Renal calculi     Shoulder joint pain      Past Surgical History:   Procedure Laterality Date    CARPAL TUNNEL RELEASE Bilateral     Last Assessed:9/26/2014    CYSTOSCOPY      EYE SURGERY  09/04/2019    Detached retina    HERNIA REPAIR      Femoral Hernia Repair Unilateral    NM COLONOSCOPY FLX DX W/COLLJ SPEC WHEN PFRMD  N/A 12/13/2018    Procedure: COLONOSCOPY;  Surgeon: Amy Verdugo MD;  Location: [facility];  Service: Gastroenterology    AZ CYSTO/URETERO W/LITHOTRIPSY &INDWELL STENT INSRT Left 8/11/2016    Procedure: CYSTOSCOPY URETEROSCOPY,RETROGRADE PYELOGRAM AND INSERTION STENT URETERAL;  Surgeon: Sigifredo Lozada MD;  Location: [facility];  Service: Urology    AZ LITHOTRIPSY XTRCORP SHOCK WAVE Right 11/17/2016    Procedure: ESWL;  Surgeon: Sigifredo Lozada MD;  Location: [facility];  Service: Urology     Family History   Problem Relation Age of Onset    Coronary artery disease Mother     Lung cancer Mother     Heart disease Father     Lung cancer Father     Cancer Father     Stomach cancer Brother     Prostate cancer Brother      Social History     Tobacco Use    Smoking status: Never    Smokeless tobacco: Never   Vaping Use    Vaping status: Never Used   Substance and Sexual Activity    Alcohol use: Not Currently    Drug use: No    Sexual activity: Not on file     Current Outpatient Medications on File Prior to Visit   Medication Sig    allopurinol (ZYLOPRIM) 100 mg tablet Take 1 tablet (100 mg total) by mouth daily    CINNAMON PO Take 1,000 mg by mouth daily    glimepiride (AMARYL) 4 mg tablet Take 1 tablet (4 mg total) by mouth daily with breakfast    lisinopril (ZESTRIL) 10 mg tablet Take 1 tablet (10 mg total) by mouth daily    metFORMIN (GLUCOPHAGE) 850 mg tablet TAKE 1 TABLET BY MOUTH TWICE A DAY WITH FOOD    Multiple Vitamin (multivitamin) capsule Take 1 capsule by mouth daily    simvastatin (ZOCOR) 10 mg tablet TAKE 1 TABLET BY MOUTH EVERYDAY AT BEDTIME     Allergies   Allergen Reactions    Penicillins      Unknown as child     Immunization History   Administered Date(s) Administered    COVID-19 PFIZER VACCINE 0.3 ML IM 04/01/2021, 04/22/2021    INFLUENZA 12/02/2021, 12/12/2022    Influenza Split High Dose Preservative Free IM 09/24/2024    Influenza, high dose seasonal 0.7 mL 12/07/2020, 12/02/2021, 12/12/2022,  "01/10/2024    Influenza, recombinant, quadrivalent,injectable, preservative free 09/20/2018, 12/03/2019    Pneumococcal Conjugate 13-Valent 05/13/2016    Pneumococcal Conjugate Vaccine 20-valent (Pcv20), Polysace 05/16/2024    Pneumococcal Polysaccharide PPV23 05/13/2019    Tdap 05/13/2016     Objective     /70   Pulse 68   Resp 16   Ht 5' 8\" (1.727 m)   Wt 127 kg (279 lb)   SpO2 97%   BMI 42.42 kg/m²     Physical Exam  Vitals and nursing note reviewed.   Constitutional:       General: He is not in acute distress.     Appearance: Normal appearance.   HENT:      Head: Normocephalic and atraumatic.      Mouth/Throat:      Mouth: Mucous membranes are dry.      Pharynx: Oropharynx is clear.   Eyes:      Extraocular Movements: Extraocular movements intact.      Conjunctiva/sclera: Conjunctivae normal.   Cardiovascular:      Rate and Rhythm: Normal rate and regular rhythm.      Heart sounds: No murmur heard.  Pulmonary:      Effort: Pulmonary effort is normal.      Breath sounds: Normal breath sounds. No wheezing or rales.   Abdominal:      General: Abdomen is flat. Bowel sounds are normal. There is no distension.      Palpations: Abdomen is soft. There is no mass.      Tenderness: There is no abdominal tenderness. There is no guarding.   Musculoskeletal:         General: No swelling, tenderness, deformity or signs of injury. Normal range of motion.      Cervical back: Normal range of motion and neck supple.   Lymphadenopathy:      Cervical: No cervical adenopathy.   Skin:     Coloration: Skin is not jaundiced or pale.      Findings: No rash.   Neurological:      General: No focal deficit present.      Mental Status: He is alert and oriented to person, place, and time.      Motor: No weakness.   Psychiatric:         Mood and Affect: Mood normal.         Behavior: Behavior normal.         Thought Content: Thought content normal.         Judgment: Judgment normal.         "

## 2024-10-02 ENCOUNTER — TELEPHONE (OUTPATIENT)
Dept: FAMILY MEDICINE CLINIC | Facility: CLINIC | Age: 69
End: 2024-10-02

## 2024-10-02 NOTE — TELEPHONE ENCOUNTER
----- Message from Kamryn Chavez MD sent at 10/2/2024  9:54 AM EDT -----  Please inform the patient normal eye exam result

## 2024-10-05 DIAGNOSIS — E78.5 HYPERLIPIDEMIA, UNSPECIFIED HYPERLIPIDEMIA TYPE: ICD-10-CM

## 2024-10-07 NOTE — TELEPHONE ENCOUNTER
Reason for call:   [x] Refill   [] Prior Auth  [] Other:     Office:   [x] PCP/Provider - Kamryn Chavez MD /AN[facility]   [] Specialty/Provider -     Medication: metFORMIN (GLUCOPHAGE) 850 mg tablet     Dose/Frequency: TAKE 1 TABLET BY MOUTH TWICE A DAY WITH FOOD     Quantity: 180    Pharmacy: [facility]/pharmacy #[id number] [facility], [address]     Does the patient have enough for 3 days?   [x] Yes   [] No - Send as HP to POD

## 2024-10-09 ENCOUNTER — CONSULT (OUTPATIENT)
Dept: NEPHROLOGY | Facility: CLINIC | Age: 69
End: 2024-10-09
Payer: MEDICARE

## 2024-10-09 VITALS — HEIGHT: 68 IN | BODY MASS INDEX: 43.19 KG/M2 | WEIGHT: 285 LBS

## 2024-10-09 DIAGNOSIS — R10.9 FLANK PAIN: ICD-10-CM

## 2024-10-09 DIAGNOSIS — N18.31 TYPE 2 DIABETES MELLITUS WITH STAGE 3A CHRONIC KIDNEY DISEASE AND HYPERTENSION (HCC): ICD-10-CM

## 2024-10-09 DIAGNOSIS — E66.01 MORBID OBESITY WITH BMI OF 40.0-44.9, ADULT (HCC): Primary | ICD-10-CM

## 2024-10-09 DIAGNOSIS — I12.9 TYPE 2 DIABETES MELLITUS WITH STAGE 3A CHRONIC KIDNEY DISEASE AND HYPERTENSION (HCC): ICD-10-CM

## 2024-10-09 DIAGNOSIS — N18.31 STAGE 3A CHRONIC KIDNEY DISEASE (HCC): ICD-10-CM

## 2024-10-09 DIAGNOSIS — N18.31 TYPE 2 DIABETES MELLITUS WITH STAGE 3A CHRONIC KIDNEY DISEASE, WITHOUT LONG-TERM CURRENT USE OF INSULIN (HCC): ICD-10-CM

## 2024-10-09 DIAGNOSIS — E11.22 TYPE 2 DIABETES MELLITUS WITH STAGE 3A CHRONIC KIDNEY DISEASE AND HYPERTENSION (HCC): ICD-10-CM

## 2024-10-09 DIAGNOSIS — E11.22 TYPE 2 DIABETES MELLITUS WITH STAGE 3A CHRONIC KIDNEY DISEASE, WITHOUT LONG-TERM CURRENT USE OF INSULIN (HCC): ICD-10-CM

## 2024-10-09 PROCEDURE — 99204 OFFICE O/P NEW MOD 45 MIN: CPT | Performed by: INTERNAL MEDICINE

## 2024-10-09 NOTE — ASSESSMENT & PLAN NOTE
-- BMI 43.33  --Recommend decreasing portion sizes, encouraging healthy choices of fruits and vegetables, consuming healthier snacks and moderation in carbohydrate intake. Exercise recommendations; 3-5 times a week, the American Heart Association recommends 150 minutes a week moderate exercise  --Strongly recommend evaluation by nutritionist  --Overweight and obesity have been associated with increased mortality and morbidity.  Associated with a reduction in life expectancy, associated with increased all cause and cardiovascular mortality  --Metabolic risks, including increased risk of diabetes type 2, insulin resistance with hyperinsulinemia (weight loss of 5 to 7% associated with a decreased risk of type 2 diabetes among individuals with prediabetes and weight loss of 15% can lead to dramatic improvement of diabetes in nearly half of people).  Dyslipidemia, hypertension and heart disease are all increased in patients with obesity.  Along with increased risk of stroke increased risk of multiple cancer types.  Can also be associated with increased renal dysfunction, strongest risk factor for new onset chronic kidney disease.  There is also a degree of compensatory hyperfiltration to meet high in the metabolic demands of increased body weight.  This can also result in increased increased risk for nephrolithiasis.   Orders:    CT renal stone study abdomen pelvis wo contrast; Future    Albumin / creatinine urine ratio; Future    Comprehensive metabolic panel; Future    Hemoglobin A1C; Future    PTH, intact; Future    Phosphorus; Future    CBC and Platelet; Future

## 2024-10-09 NOTE — ASSESSMENT & PLAN NOTE
Lab Results   Component Value Date    HGBA1C 7.3 (H) 09/13/2024   Chronic Kidney Disease Stage III (G3aA1)  --Imaging: CT scan revealed a 2 cm exophytic mid to upper pole simple cyst.  Typically simple cyst requires no further follow-up CT scan and renal ultrasound reviewed in the past.  --Urinalysis: [facility]  --Proteinuria: Microalbumin/creatinine ratio was within normal limits.  --Baseline Kidney Function: 1.5-1.7 mg/dL  --Etiology: Presumed be secondary to diabetic kidney disease, hypertensive sclerosis and obesity related renal dysfunction  --Biopsy Proven: No  --Family history of kidney disease.  Grandfather was on dialysis.  Sister had chronic kidney disease secondary to membranous nephropathy  --RAAS Blockade: Lisinopril  --Reducing Cardiovascular Risk Factors:  Simvastatin+ Ezetimibe reduced atherosclerotic events in CKD (SHARP trial); Low dose aspirin safe (if no contraindications exist); smoking is an independent risk factor for Chronic Kidney Disease and progression, strongly recommend smoking cessation  --Sodium-Glucose Cotransporter-2 (SGLT2) Inhibitors:  May see an acute drop in the eGFR initially when starting the medication but then a stabilization of the renal function, with slower loss of renal function as compared to placebo.  Relative risk of end-stage renal disease, doubling of serum creatinine or death from renal causes were also found to be lower as compared to placebo. ([facility]).  DAPA-CKD study, showed that patients with chronic kidney disease regardless of the presence or absence of diabetes the risk of composite of a sustained decline in the estimated GFR of at least 50%, end-stage renal disease or death from renal or cardiovascular causes was significantly lower with Dapagliflozin than with placebo. EMPEROR-Reduced study also showed beneficial effects. EMPA-CKD, among wide range of patients with CKD, who were at risk for progression, empagliflozin led a lower risk of kidney disease  progression or death from cardiovascular causes than placebo.  If no contraindications exist I would recommend this medication added to the regiment. If eGFR < 60 cc/min (avoid ertugliflozin); avoid or caution with GFR < 20 mL/min, not an absolute contraindication, likely lower benefits.   --Finerenone: In patients with chronic kidney disease with type 2 diabetes, treatment led to lower risks of CKD progression and cardiovascular events than placebo. (FIDELIO-DKD)  --Status: Renal function has been quite stable over the last 4 years  --Management/Recommendations: Check a CT without contrast to evaluate for flank pain.  And to reevaluate that left renal cyst.  If the cyst is stable no further follow-up required.  Continue RAAS blockade with lisinopril.  Continue good diabetic control.  Exercise weight loss strongly recommended.    Hypertension  -- BMI 43.33  -- Encouraged weight loss exercise diet and DASH diet  -- Blood pressure under excellent control at home.  Has a component of isolated clinic hypertension also known as whitecoat hypertension.  -- Blood pressure close to target.  Target blood pressure less than 130/80  -- Non-smoker  -- Reinforced lifestyle modification  -- Continue lisinopril 10 mg daily  -- Home blood pressure monitoring    Diabetes mellitus type 2  -- Hemoglobin A1c improving to 7.3  -- Continue metformin, semaglutide, glimepiride  -- No objections to SGLT2 inhibitor  -- Weight loss exercise and dieting  -- Yearly ophthalmologic and podiatry checks    Orders:    Ambulatory Referral to Nephrology    CT renal stone study abdomen pelvis wo contrast; Future    Albumin / creatinine urine ratio; Future    Comprehensive metabolic panel; Future    Hemoglobin A1C; Future    PTH, intact; Future    Phosphorus; Future    CBC and Platelet; Future

## 2024-10-09 NOTE — PATIENT INSTRUCTIONS
1.)  Low 2 g sodium diet    2.)  Monitor weights at home    3.)  Avoid NSAIDs (ibuprofen, Motrin, Advil, Aleve, naproxen)    4.)  Monitor blood pressure at home, call if blood pressure greater than 150/90 persistently    5.) I will plan to discuss all results including blood work, and/or imaging at our next visit, unless there is an urgent indication, in which case I will call you earlier. If you have any questions or concerns about your results, please feel free to call our office.    6.) CT scan

## 2024-10-09 NOTE — ASSESSMENT & PLAN NOTE
Lab Results   Component Value Date    HGBA1C 7.3 (H) 09/13/2024   -- Please see above in regards to hypertension diabetes and chronic kidney disease stage III    Orders:    CT renal stone study abdomen pelvis wo contrast; Future    Albumin / creatinine urine ratio; Future    Comprehensive metabolic panel; Future    Hemoglobin A1C; Future    PTH, intact; Future    Phosphorus; Future    CBC and Platelet; Future

## 2024-10-09 NOTE — PROGRESS NOTES
Nephrology consultation  Name: Sreedhar Lagunas      : 1955      MRN: [id number]  Encounter Provider: Charlene Amaya MD  Encounter Date: 10/9/2024   Encounter department: [facility] [place]    Assessment & Plan  Type 2 diabetes mellitus with stage 3a chronic kidney disease, without long-term current use of insulin (HCC)    Lab Results   Component Value Date    HGBA1C 7.3 (H) 2024   Chronic Kidney Disease Stage III (G3aA1)  --Imaging: CT scan revealed a 2 cm exophytic mid to upper pole simple cyst.  Typically simple cyst requires no further follow-up CT scan and renal ultrasound reviewed in the past.  --Urinalysis: [facility]  --Proteinuria: Microalbumin/creatinine ratio was within normal limits.  --Baseline Kidney Function: 1.5-1.7 mg/dL  --Etiology: Presumed be secondary to diabetic kidney disease, hypertensive sclerosis and obesity related renal dysfunction  --Biopsy Proven: No  --Family history of kidney disease.  Grandfather was on dialysis.  Sister had chronic kidney disease secondary to membranous nephropathy  --RAAS Blockade: Lisinopril  --Reducing Cardiovascular Risk Factors:  Simvastatin+ Ezetimibe reduced atherosclerotic events in CKD (SHARP trial); Low dose aspirin safe (if no contraindications exist); smoking is an independent risk factor for Chronic Kidney Disease and progression, strongly recommend smoking cessation  --Sodium-Glucose Cotransporter-2 (SGLT2) Inhibitors:  May see an acute drop in the eGFR initially when starting the medication but then a stabilization of the renal function, with slower loss of renal function as compared to placebo.  Relative risk of end-stage renal disease, doubling of serum creatinine or death from renal causes were also found to be lower as compared to placebo. (CREDENCE).  DAPA-CKD study, showed that patients with chronic kidney disease regardless of the presence or absence of diabetes the risk of composite of a sustained decline in the  estimated GFR of at least 50%, end-stage renal disease or death from renal or cardiovascular causes was significantly lower with Dapagliflozin than with placebo. EMPEROR-Reduced study also showed beneficial effects. EMPA-CKD, among wide range of patients with CKD, who were at risk for progression, empagliflozin led a lower risk of kidney disease progression or death from cardiovascular causes than placebo.  If no contraindications exist I would recommend this medication added to the regiment. If eGFR < 60 cc/min (avoid ertugliflozin); avoid or caution with GFR < 20 mL/min, not an absolute contraindication, likely lower benefits.   --Finerenone: In patients with chronic kidney disease with type 2 diabetes, treatment led to lower risks of CKD progression and cardiovascular events than placebo. (FIDELIO-DKD)  --Status: Renal function has been quite stable over the last 4 years  --Management/Recommendations: Check a CT without contrast to evaluate for flank pain.  And to reevaluate that left renal cyst.  If the cyst is stable no further follow-up required.  Continue RAAS blockade with lisinopril.  Continue good diabetic control.  Exercise weight loss strongly recommended.    Hypertension  -- BMI 43.33  -- Encouraged weight loss exercise diet and DASH diet  -- Blood pressure under excellent control at home.  Has a component of isolated clinic hypertension also known as whitecoat hypertension.  -- Blood pressure close to target.  Target blood pressure less than 130/80  -- Non-smoker  -- Reinforced lifestyle modification  -- Continue lisinopril 10 mg daily  -- Home blood pressure monitoring    Diabetes mellitus type 2  -- Hemoglobin A1c improving to 7.3  -- Continue metformin, semaglutide, glimepiride  -- No objections to SGLT2 inhibitor  -- Weight loss exercise and dieting  -- Yearly ophthalmologic and podiatry checks    Orders:    Ambulatory Referral to Nephrology    CT renal stone study abdomen pelvis wo contrast;  Future    Albumin / creatinine urine ratio; Future    Comprehensive metabolic panel; Future    Hemoglobin A1C; Future    PTH, intact; Future    Phosphorus; Future    CBC and Platelet; Future    Morbid obesity with BMI of 40.0-44.9, adult (HCC)  -- BMI 43.33  --Recommend decreasing portion sizes, encouraging healthy choices of fruits and vegetables, consuming healthier snacks and moderation in carbohydrate intake. Exercise recommendations; 3-5 times a week, the American Heart Association recommends 150 minutes a week moderate exercise  --Strongly recommend evaluation by nutritionist  --Overweight and obesity have been associated with increased mortality and morbidity.  Associated with a reduction in life expectancy, associated with increased all cause and cardiovascular mortality  --Metabolic risks, including increased risk of diabetes type 2, insulin resistance with hyperinsulinemia (weight loss of 5 to 7% associated with a decreased risk of type 2 diabetes among individuals with prediabetes and weight loss of 15% can lead to dramatic improvement of diabetes in nearly half of people).  Dyslipidemia, hypertension and heart disease are all increased in patients with obesity.  Along with increased risk of stroke increased risk of multiple cancer types.  Can also be associated with increased renal dysfunction, strongest risk factor for new onset chronic kidney disease.  There is also a degree of compensatory hyperfiltration to meet high in the metabolic demands of increased body weight.  This can also result in increased increased risk for nephrolithiasis.   Orders:    CT renal stone study abdomen pelvis wo contrast; Future    Albumin / creatinine urine ratio; Future    Comprehensive metabolic panel; Future    Hemoglobin A1C; Future    PTH, intact; Future    Phosphorus; Future    CBC and Platelet; Future    Type 2 diabetes mellitus with stage 3a chronic kidney disease and hypertension ([facility])    Lab Results   Component  Value Date    HGBA1C 7.3 (H) 09/13/2024   -- Please see above in regards to hypertension diabetes and chronic kidney disease stage III    Orders:    CT renal stone study abdomen pelvis wo contrast; Future    Albumin / creatinine urine ratio; Future    Comprehensive metabolic panel; Future    Hemoglobin A1C; Future    PTH, intact; Future    Phosphorus; Future    CBC and Platelet; Future    Flank pain  -- History of nephrolithiasis  --Check a CT renal scan without contrast to evaluate for any type of renal calculi that may be causing the pain  Orders:    CT renal stone study abdomen pelvis wo contrast; Future    Albumin / creatinine urine ratio; Future    Comprehensive metabolic panel; Future    Hemoglobin A1C; Future    PTH, intact; Future    Phosphorus; Future    CBC and Platelet; Future      History of Present Illness     Sreedhar Lagunas is a 69 y.o. male who presents follow-up of chronic kidney disease stage III.  I had seen this patient back originally in 2019 for chronic kidney disease stage III.  His baseline creatinine at that time was 1.5 to 1.7 mg/dL.  He was last seen by nephrology in 2020 via virtual visit.  Since that time his renal function had been quite stable ranging primarily in the mid ones.  His diabetes did become out of control but starting to improve.  His blood pressure has been under excellent control he remains on lisinopril.  No chest pain or shortness of breath.  He is reporting flank pain and does have a history of nephrolithiasis.  He has a history of the left renal cyst exophytic described as simple.  No gross hematuria.  No urinary symptoms no dizziness no lightheadedness.    History obtained from : patient  Review of Systems   Constitutional:  Negative for activity change and fever.   Respiratory:  Negative for cough, chest tightness, shortness of breath and wheezing.    Cardiovascular:  Negative for chest pain and leg swelling.   Gastrointestinal:  Negative for abdominal pain,  diarrhea, nausea and vomiting.   Endocrine: Negative for polyuria.   Genitourinary:  Negative for difficulty urinating, dysuria, flank pain, frequency and urgency.   Musculoskeletal:  Positive for back pain.   Skin:  Negative for rash.   Neurological:  Negative for dizziness, syncope, light-headedness and headaches.     Pertinent Medical History         Medical History Reviewed by provider this encounter:       Past Medical History   Past Medical History:   Diagnosis Date    Diabetes mellitus (HCC)     Gout     Hypertension     Leukocytosis     Onset:4/26/2012    Onychomycosis     Onse:4/26/2012    Other muscle spasm     Renal calculi     Shoulder joint pain      Past Surgical History:   Procedure Laterality Date    CARPAL TUNNEL RELEASE Bilateral     Last Assessed:9/26/2014    CYSTOSCOPY      EYE SURGERY  09/04/2019    Detached retina    HERNIA REPAIR      Femoral Hernia Repair Unilateral    OR COLONOSCOPY FLX DX W/COLLJ SPEC WHEN PFRMD N/A 12/13/2018    Procedure: COLONOSCOPY;  Surgeon: Amy Verdugo MD;  Location: [facility];  Service: Gastroenterology    OR CYSTO/URETERO W/LITHOTRIPSY &INDWELL STENT INSRT Left 8/11/2016    Procedure: CYSTOSCOPY URETEROSCOPY,RETROGRADE PYELOGRAM AND INSERTION STENT URETERAL;  Surgeon: Sigifredo Lozada MD;  Location: [facility];  Service: Urology    OR LITHOTRIPSY XTRCORP SHOCK WAVE Right 11/17/2016    Procedure: ESWL;  Surgeon: Sigifredo Lozada MD;  Location: [facility];  Service: Urology     Family History   Problem Relation Age of Onset    Coronary artery disease Mother     Lung cancer Mother     Heart disease Father     Lung cancer Father     Cancer Father     Stomach cancer Brother     Prostate cancer Brother      Current Outpatient Medications on File Prior to Visit   Medication Sig Dispense Refill    allopurinol (ZYLOPRIM) 100 mg tablet Take 1 tablet (100 mg total) by mouth daily 100 tablet 1    CINNAMON PO Take 1,000 mg by mouth daily      glimepiride (AMARYL) 4 mg tablet  Take 1 tablet (4 mg total) by mouth daily with breakfast 90 tablet 3    lisinopril (ZESTRIL) 10 mg tablet Take 1 tablet (10 mg total) by mouth daily 100 tablet 1    metFORMIN (GLUCOPHAGE) 850 mg tablet TAKE 1 TABLET BY MOUTH TWICE A DAY WITH FOOD 180 tablet 1    Multiple Vitamin (multivitamin) capsule Take 1 capsule by mouth daily      semaglutide (Rybelsus) 3 MG tablet Take 1 tablet (3 mg total) by mouth daily Take on an empty stomach with 4 oz water, and wait 30 minutes before eating 30 tablet 0    [START ON 10/24/2024] semaglutide (Rybelsus) 7 MG tablet Take 1 tablet (7 mg total) by mouth daily Take on an empty stomach with 4 oz water, and wait 30 minutes before eating Do not start before October 24, 2024. 90 tablet 1    simvastatin (ZOCOR) 10 mg tablet TAKE 1 TABLET BY MOUTH EVERYDAY AT BEDTIME 90 tablet 1     No current facility-administered medications on file prior to visit.     Allergies   Allergen Reactions    Penicillins      Unknown as child      Current Outpatient Medications on File Prior to Visit   Medication Sig Dispense Refill    allopurinol (ZYLOPRIM) 100 mg tablet Take 1 tablet (100 mg total) by mouth daily 100 tablet 1    CINNAMON PO Take 1,000 mg by mouth daily      glimepiride (AMARYL) 4 mg tablet Take 1 tablet (4 mg total) by mouth daily with breakfast 90 tablet 3    lisinopril (ZESTRIL) 10 mg tablet Take 1 tablet (10 mg total) by mouth daily 100 tablet 1    metFORMIN (GLUCOPHAGE) 850 mg tablet TAKE 1 TABLET BY MOUTH TWICE A DAY WITH FOOD 180 tablet 1    Multiple Vitamin (multivitamin) capsule Take 1 capsule by mouth daily      semaglutide (Rybelsus) 3 MG tablet Take 1 tablet (3 mg total) by mouth daily Take on an empty stomach with 4 oz water, and wait 30 minutes before eating 30 tablet 0    [START ON 10/24/2024] semaglutide (Rybelsus) 7 MG tablet Take 1 tablet (7 mg total) by mouth daily Take on an empty stomach with 4 oz water, and wait 30 minutes before eating Do not start before October  "24, 2024. 90 tablet 1    simvastatin (ZOCOR) 10 mg tablet TAKE 1 TABLET BY MOUTH EVERYDAY AT BEDTIME 90 tablet 1     No current facility-administered medications on file prior to visit.      Social History     Tobacco Use    Smoking status: Never    Smokeless tobacco: Never   Vaping Use    Vaping status: Never Used   Substance and Sexual Activity    Alcohol use: Not Currently    Drug use: No    Sexual activity: Not on file         Objective     Ht 5' 8\" (1.727 m)   Wt 129 kg (285 lb)   BMI 43.33 kg/m²     Physical Exam  Vitals and nursing note reviewed.   Constitutional:       General: He is not in acute distress.     Appearance: He is well-developed. He is obese. He is not diaphoretic.   HENT:      Head: Normocephalic and atraumatic.   Eyes:      General: No scleral icterus.     Pupils: Pupils are equal, round, and reactive to light.   Cardiovascular:      Rate and Rhythm: Normal rate and regular rhythm.      Heart sounds: Normal heart sounds. No murmur heard.     No friction rub. No gallop.   Pulmonary:      Effort: Pulmonary effort is normal. No respiratory distress.      Breath sounds: Normal breath sounds. No wheezing or rales.   Chest:      Chest wall: No tenderness.   Abdominal:      General: Bowel sounds are normal. There is no distension.      Palpations: Abdomen is soft.      Tenderness: There is no abdominal tenderness. There is no rebound.   Musculoskeletal:         General: Normal range of motion.      Cervical back: Normal range of motion and neck supple.   Skin:     Findings: No rash.   Neurological:      Mental Status: He is alert and oriented to person, place, and time.       Administrative Statements   I have spent a total time of 45 minutes in caring for this patient on the day of the visit/encounter including Diagnostic results, Prognosis, Risks and benefits of tx options, Instructions for management, Patient and family education, Importance of tx compliance, Risk factor reductions, " Impressions, Counseling / Coordination of care, Documenting in the medical record, Reviewing / ordering tests, medicine, procedures  , and Obtaining or reviewing history  .

## 2024-10-16 ENCOUNTER — HOSPITAL ENCOUNTER (OUTPATIENT)
Dept: CT IMAGING | Facility: HOSPITAL | Age: 69
Discharge: HOME | End: 2024-10-16
Attending: INTERNAL MEDICINE
Payer: MEDICARE

## 2024-10-16 DIAGNOSIS — N18.31 TYPE 2 DIABETES MELLITUS WITH STAGE 3A CHRONIC KIDNEY DISEASE AND HYPERTENSION (HCC): ICD-10-CM

## 2024-10-16 DIAGNOSIS — E11.22 TYPE 2 DIABETES MELLITUS WITH STAGE 3A CHRONIC KIDNEY DISEASE AND HYPERTENSION (HCC): ICD-10-CM

## 2024-10-16 DIAGNOSIS — N18.31 TYPE 2 DIABETES MELLITUS WITH STAGE 3A CHRONIC KIDNEY DISEASE, WITHOUT LONG-TERM CURRENT USE OF INSULIN (HCC): ICD-10-CM

## 2024-10-16 DIAGNOSIS — N18.31 STAGE 3A CHRONIC KIDNEY DISEASE (HCC): ICD-10-CM

## 2024-10-16 DIAGNOSIS — E66.01 MORBID OBESITY WITH BMI OF 40.0-44.9, ADULT (HCC): ICD-10-CM

## 2024-10-16 DIAGNOSIS — R10.9 FLANK PAIN: ICD-10-CM

## 2024-10-16 DIAGNOSIS — I12.9 TYPE 2 DIABETES MELLITUS WITH STAGE 3A CHRONIC KIDNEY DISEASE AND HYPERTENSION (HCC): ICD-10-CM

## 2024-10-16 DIAGNOSIS — E11.22 TYPE 2 DIABETES MELLITUS WITH STAGE 3A CHRONIC KIDNEY DISEASE, WITHOUT LONG-TERM CURRENT USE OF INSULIN (HCC): ICD-10-CM

## 2024-10-16 PROCEDURE — 74176 CT ABD & PELVIS W/O CONTRAST: CPT

## 2024-10-17 DIAGNOSIS — N40.0 ENLARGED PROSTATE: ICD-10-CM

## 2024-10-17 DIAGNOSIS — N20.0 NEPHROLITHIASIS: Primary | ICD-10-CM

## 2024-10-17 RX ORDER — TAMSULOSIN HYDROCHLORIDE 0.4 MG/1
0.4 CAPSULE ORAL
Qty: 90 CAPSULE | Refills: 1 | Status: SHIPPED | OUTPATIENT
Start: 2024-10-17

## 2024-10-17 NOTE — PROGRESS NOTES
Nephrology    Discussed the CT scan results with the patient.    Bilateral nonobstructing calculi measuring about 6.6 and 7 mm.  He is not reporting any active flank pain.  No gross hematuria.    Also has enlarged prostate.    Has not seen urology since 2021.    Will start him on Flomax 0.4 mg.    If no symptomatic relief recommend reevaluating with urology.

## 2024-11-25 ENCOUNTER — TELEPHONE (OUTPATIENT)
Dept: NEPHROLOGY | Facility: CLINIC | Age: 69
End: 2024-11-25

## 2025-01-02 DIAGNOSIS — I10 BENIGN ESSENTIAL HYPERTENSION: ICD-10-CM

## 2025-01-02 DIAGNOSIS — N20.0 NEPHROLITHIASIS: ICD-10-CM

## 2025-01-03 RX ORDER — LISINOPRIL 10 MG/1
10 TABLET ORAL DAILY
Qty: 90 TABLET | Refills: 1 | Status: SHIPPED | OUTPATIENT
Start: 2025-01-03

## 2025-01-03 RX ORDER — ALLOPURINOL 100 MG/1
100 TABLET ORAL DAILY
Qty: 90 TABLET | Refills: 1 | Status: SHIPPED | OUTPATIENT
Start: 2025-01-03

## 2025-01-22 ENCOUNTER — APPOINTMENT (OUTPATIENT)
Dept: LAB | Facility: CLINIC | Age: 70
End: 2025-01-22
Payer: MEDICARE

## 2025-01-22 ENCOUNTER — RESULTS FOLLOW-UP (OUTPATIENT)
Dept: FAMILY MEDICINE CLINIC | Facility: CLINIC | Age: 70
End: 2025-01-22

## 2025-01-22 DIAGNOSIS — N18.31 TYPE 2 DIABETES MELLITUS WITH STAGE 3A CHRONIC KIDNEY DISEASE, WITHOUT LONG-TERM CURRENT USE OF INSULIN (HCC): ICD-10-CM

## 2025-01-22 DIAGNOSIS — E11.22 TYPE 2 DIABETES MELLITUS WITH STAGE 3A CHRONIC KIDNEY DISEASE, WITHOUT LONG-TERM CURRENT USE OF INSULIN (HCC): ICD-10-CM

## 2025-01-22 LAB
ALBUMIN SERPL BCG-MCNC: 4 G/DL (ref 3.5–5)
ALP SERPL-CCNC: 74 U/L (ref 34–104)
ALT SERPL W P-5'-P-CCNC: 24 U/L (ref 7–52)
ANION GAP SERPL CALCULATED.3IONS-SCNC: 8 MMOL/L (ref 4–13)
AST SERPL W P-5'-P-CCNC: 17 U/L (ref 13–39)
BILIRUB SERPL-MCNC: 0.4 MG/DL (ref 0.2–1)
BUN SERPL-MCNC: 25 MG/DL (ref 5–25)
CALCIUM SERPL-MCNC: 9.1 MG/DL (ref 8.4–10.2)
CHLORIDE SERPL-SCNC: 101 MMOL/L (ref 96–108)
CHOLEST SERPL-MCNC: 126 MG/DL (ref ?–200)
CO2 SERPL-SCNC: 26 MMOL/L (ref 21–32)
CREAT SERPL-MCNC: 1.1 MG/DL (ref 0.6–1.3)
ERYTHROCYTE [DISTWIDTH] IN BLOOD BY AUTOMATED COUNT: 14.3 % (ref 11.6–15.1)
EST. AVERAGE GLUCOSE BLD GHB EST-MCNC: 183 MG/DL
GFR SERPL CREATININE-BSD FRML MDRD: 68 ML/MIN/1.73SQ M
GLUCOSE P FAST SERPL-MCNC: 212 MG/DL (ref 65–99)
HBA1C MFR BLD: 8 %
HCT VFR BLD AUTO: 43.1 % (ref 36.5–49.3)
HDLC SERPL-MCNC: 39 MG/DL
HGB BLD-MCNC: 13.7 G/DL (ref 12–17)
LDLC SERPL CALC-MCNC: 62 MG/DL (ref 0–100)
MCH RBC QN AUTO: 29.3 PG (ref 26.8–34.3)
MCHC RBC AUTO-ENTMCNC: 31.8 G/DL (ref 31.4–37.4)
MCV RBC AUTO: 92 FL (ref 82–98)
PLATELET # BLD AUTO: 168 THOUSANDS/UL (ref 149–390)
PMV BLD AUTO: 11.4 FL (ref 8.9–12.7)
POTASSIUM SERPL-SCNC: 4.6 MMOL/L (ref 3.5–5.3)
PROT SERPL-MCNC: 6.8 G/DL (ref 6.4–8.4)
RBC # BLD AUTO: 4.68 MILLION/UL (ref 3.88–5.62)
SODIUM SERPL-SCNC: 135 MMOL/L (ref 135–147)
TRIGL SERPL-MCNC: 124 MG/DL (ref ?–150)
TSH SERPL DL<=0.05 MIU/L-ACNC: 3.49 UIU/ML (ref 0.45–4.5)
WBC # BLD AUTO: 9.02 THOUSAND/UL (ref 4.31–10.16)

## 2025-01-22 PROCEDURE — 36415 COLL VENOUS BLD VENIPUNCTURE: CPT

## 2025-01-22 PROCEDURE — 83036 HEMOGLOBIN GLYCOSYLATED A1C: CPT

## 2025-01-22 PROCEDURE — 85027 COMPLETE CBC AUTOMATED: CPT

## 2025-01-22 PROCEDURE — 80061 LIPID PANEL: CPT

## 2025-01-22 PROCEDURE — 84443 ASSAY THYROID STIM HORMONE: CPT

## 2025-01-22 PROCEDURE — 80053 COMPREHEN METABOLIC PANEL: CPT

## 2025-01-28 ENCOUNTER — OFFICE VISIT (OUTPATIENT)
Dept: FAMILY MEDICINE CLINIC | Facility: CLINIC | Age: 70
End: 2025-01-28
Payer: MEDICARE

## 2025-01-28 VITALS
RESPIRATION RATE: 16 BRPM | WEIGHT: 285 LBS | SYSTOLIC BLOOD PRESSURE: 138 MMHG | BODY MASS INDEX: 43.19 KG/M2 | DIASTOLIC BLOOD PRESSURE: 70 MMHG | OXYGEN SATURATION: 96 % | HEIGHT: 68 IN | HEART RATE: 82 BPM

## 2025-01-28 DIAGNOSIS — R09.89 DECREASED PEDAL PULSES: ICD-10-CM

## 2025-01-28 DIAGNOSIS — I10 BENIGN ESSENTIAL HYPERTENSION: ICD-10-CM

## 2025-01-28 DIAGNOSIS — E78.2 MIXED HYPERLIPIDEMIA: ICD-10-CM

## 2025-01-28 DIAGNOSIS — I12.9 TYPE 2 DIABETES MELLITUS WITH STAGE 3A CHRONIC KIDNEY DISEASE AND HYPERTENSION (HCC): Primary | ICD-10-CM

## 2025-01-28 DIAGNOSIS — N18.31 TYPE 2 DIABETES MELLITUS WITH STAGE 3A CHRONIC KIDNEY DISEASE AND HYPERTENSION (HCC): Primary | ICD-10-CM

## 2025-01-28 DIAGNOSIS — L84 CALLUS OF FOOT: ICD-10-CM

## 2025-01-28 DIAGNOSIS — E66.01 MORBID OBESITY WITH BMI OF 40.0-44.9, ADULT (HCC): ICD-10-CM

## 2025-01-28 DIAGNOSIS — E11.22 TYPE 2 DIABETES MELLITUS WITH STAGE 3A CHRONIC KIDNEY DISEASE AND HYPERTENSION (HCC): Primary | ICD-10-CM

## 2025-01-28 DIAGNOSIS — Z00.00 MEDICARE ANNUAL WELLNESS VISIT, SUBSEQUENT: ICD-10-CM

## 2025-01-28 DIAGNOSIS — L84 CALLUS: ICD-10-CM

## 2025-01-28 PROCEDURE — G0439 PPPS, SUBSEQ VISIT: HCPCS | Performed by: FAMILY MEDICINE

## 2025-01-28 PROCEDURE — 99214 OFFICE O/P EST MOD 30 MIN: CPT | Performed by: FAMILY MEDICINE

## 2025-01-28 PROCEDURE — G2211 COMPLEX E/M VISIT ADD ON: HCPCS | Performed by: FAMILY MEDICINE

## 2025-01-28 RX ORDER — PIOGLITAZONE 30 MG/1
30 TABLET ORAL DAILY
Qty: 100 TABLET | Refills: 3 | Status: SHIPPED | OUTPATIENT
Start: 2025-01-28

## 2025-01-28 NOTE — ASSESSMENT & PLAN NOTE
A1c increased, added Actos, discussed about weight loss medication like Ozempic and Wegovy, he is not interested  Lab Results   Component Value Date    HGBA1C 8.0 (H) 01/22/2025     Orders:  •  Diabetic foot exam; Future  •  pioglitazone (ACTOS) 30 mg tablet; Take 1 tablet (30 mg total) by mouth daily  •  VAS ARTERIAL DUPLEX- LOWER LIMB BILATERAL; Future  •  Albumin / creatinine urine ratio; Future  •  Comprehensive metabolic panel; Future  •  Hemoglobin A1C; Future  •  CBC and Platelet; Future  •  TSH, 3rd generation with Free T4 reflex; Future  •  Lipid Panel with Direct LDL reflex; Future

## 2025-01-28 NOTE — PATIENT INSTRUCTIONS
Medicare Preventive Visit Patient Instructions  Thank you for completing your Welcome to Medicare Visit or Medicare Annual Wellness Visit today. Your next wellness visit will be due in one year (1/29/2026).  The screening/preventive services that you may require over the next 5-10 years are detailed below. Some tests may not apply to you based off risk factors and/or age. Screening tests ordered at today's visit but not completed yet may show as past due. Also, please note that scanned in results may not display below.  Preventive Screenings:  Service Recommendations Previous Testing/Comments   Colorectal Cancer Screening  Colonoscopy    Fecal Occult Blood Test (FOBT)/Fecal Immunochemical Test (FIT)  Fecal DNA/Cologuard Test  Flexible Sigmoidoscopy Age: 45-75 years old   Colonoscopy: every 10 years (May be performed more frequently if at higher risk)  OR  FOBT/FIT: every 1 year  OR  Cologuard: every 3 years  OR  Sigmoidoscopy: every 5 years  Screening may be recommended earlier than age 45 if at higher risk for colorectal cancer. Also, an individualized decision between you and your healthcare provider will decide whether screening between the ages of 76-85 would be appropriate. Colonoscopy: 08/23/2024  FOBT/FIT: Not on file  Cologuard: Not on file  Sigmoidoscopy: Not on file    Screening Current     Prostate Cancer Screening Individualized decision between patient and health care provider in men between ages of 55-69   Medicare will cover every 12 months beginning on the day after your 50th birthday PSA: 1.77 ng/mL     Screening Current     Hepatitis C Screening Once for adults born between 1945 and 1965  More frequently in patients at high risk for Hepatitis C Hep C Antibody: 08/13/2019    Screening Current   Diabetes Screening 1-2 times per year if you're at risk for diabetes or have pre-diabetes Fasting glucose: 212 mg/dL (1/22/2025)  A1C: 8.0 % (1/22/2025)  Screening Not Indicated  History Diabetes  Screening  Current   Cholesterol Screening Once every 5 years if you don't have a lipid disorder. May order more often based on risk factors. Lipid panel: 01/22/2025  Screening Not Indicated  History Lipid Disorder  Screening Current      Other Preventive Screenings Covered by Medicare:  Abdominal Aortic Aneurysm (AAA) Screening: covered once if your at risk. You're considered to be at risk if you have a family history of AAA or a male between the age of 65-75 who smoking at least 100 cigarettes in your lifetime.  Lung Cancer Screening: covers low dose CT scan once per year if you meet all of the following conditions: (1) Age 55-77; (2) No signs or symptoms of lung cancer; (3) Current smoker or have quit smoking within the last 15 years; (4) You have a tobacco smoking history of at least 20 pack years (packs per day x number of years you smoked); (5) You get a written order from a healthcare provider.  Glaucoma Screening: covered annually if you're considered high risk: (1) You have diabetes OR (2) Family history of glaucoma OR (3)  aged 50 and older OR (4)  American aged 65 and older  Osteoporosis Screening: covered every 2 years if you meet one of the following conditions: (1) Have a vertebral abnormality; (2) On glucocorticoid therapy for more than 3 months; (3) Have primary hyperparathyroidism; (4) On osteoporosis medications and need to assess response to drug therapy.  HIV Screening: covered annually if you're between the age of 15-65. Also covered annually if you are younger than 15 and older than 65 with risk factors for HIV infection. For pregnant patients, it is covered up to 3 times per pregnancy.    Immunizations:  Immunization Recommendations   Influenza Vaccine Annual influenza vaccination during flu season is recommended for all persons aged >= 6 months who do not have contraindications   Pneumococcal Vaccine   * Pneumococcal conjugate vaccine = PCV13 (Prevnar 13), PCV15 (Vaxneuvance),  PCV20 (Prevnar 20)  * Pneumococcal polysaccharide vaccine = PPSV23 (Pneumovax) Adults 19-65 yo with certain risk factors or if 65+ yo  If never received any pneumonia vaccine: recommend Prevnar 20 (PCV20)  Give PCV20 if previously received 1 dose of PCV13 or PPSV23   Hepatitis B Vaccine 3 dose series if at intermediate or high risk (ex: diabetes, end stage renal disease, liver disease)   Respiratory syncytial virus (RSV) Vaccine - COVERED BY MEDICARE PART D  * RSVPreF3 (Arexvy) CDC recommends that adults 60 years of age and older may receive a single dose of RSV vaccine using shared clinical decision-making (SCDM)   Tetanus (Td) Vaccine - COST NOT COVERED BY MEDICARE PART B Following completion of primary series, a booster dose should be given every 10 years to maintain immunity against tetanus. Td may also be given as tetanus wound prophylaxis.   Tdap Vaccine - COST NOT COVERED BY MEDICARE PART B Recommended at least once for all adults. For pregnant patients, recommended with each pregnancy.   Shingles Vaccine (Shingrix) - COST NOT COVERED BY MEDICARE PART B  2 shot series recommended in those 19 years and older who have or will have weakened immune systems or those 50 years and older     Health Maintenance Due:      Topic Date Due   • Colorectal Cancer Screening  08/21/2034   • Hepatitis C Screening  Completed     Immunizations Due:      Topic Date Due   • COVID-19 Vaccine (3 - 2024-25 season) 09/01/2024     Advance Directives   What are advance directives?  Advance directives are legal documents that state your wishes and plans for medical care. These plans are made ahead of time in case you lose your ability to make decisions for yourself. Advance directives can apply to any medical decision, such as the treatments you want, and if you want to donate organs.   What are the types of advance directives?  There are many types of advance directives, and each state has rules about how to use them. You may choose a  combination of any of the following:  Living will:  This is a written record of the treatment you want. You can also choose which treatments you do not want, which to limit, and which to stop at a certain time. This includes surgery, medicine, IV fluid, and tube feedings.   Durable power of  for healthcare (DPAHC):  This is a written record that states who you want to make healthcare choices for you when you are unable to make them for yourself. This person, called a proxy, is usually a family member or a friend. You may choose more than 1 proxy.  Do not resuscitate (DNR) order:  A DNR order is used in case your heart stops beating or you stop breathing. It is a request not to have certain forms of treatment, such as CPR. A DNR order may be included in other types of advance directives.  Medical directive:  This covers the care that you want if you are in a coma, near death, or unable to make decisions for yourself. You can list the treatments you want for each condition. Treatment may include pain medicine, surgery, blood transfusions, dialysis, IV or tube feedings, and a ventilator (breathing machine).  Values history:  This document has questions about your views, beliefs, and how you feel and think about life. This information can help others choose the care that you would choose.  Why are advance directives important?  An advance directive helps you control your care. Although spoken wishes may be used, it is better to have your wishes written down. Spoken wishes can be misunderstood, or not followed. Treatments may be given even if you do not want them. An advance directive may make it easier for your family to make difficult choices about your care.   Weight Management   Why it is important to manage your weight:  Being overweight increases your risk of health conditions such as heart disease, high blood pressure, type 2 diabetes, and certain types of cancer. It can also increase your risk for  osteoarthritis, sleep apnea, and other respiratory problems. Aim for a slow, steady weight loss. Even a small amount of weight loss can lower your risk of health problems.  How to lose weight safely:  A safe and healthy way to lose weight is to eat fewer calories and get regular exercise. You can lose up about 1 pound a week by decreasing the number of calories you eat by 500 calories each day.   Healthy meal plan for weight management:  A healthy meal plan includes a variety of foods, contains fewer calories, and helps you stay healthy. A healthy meal plan includes the following:  Eat whole-grain foods more often.  A healthy meal plan should contain fiber. Fiber is the part of grains, fruits, and vegetables that is not broken down by your body. Whole-grain foods are healthy and provide extra fiber in your diet. Some examples of whole-grain foods are whole-wheat breads and pastas, oatmeal, brown rice, and bulgur.  Eat a variety of vegetables every day.  Include dark, leafy greens such as spinach, kale, katharine greens, and mustard greens. Eat yellow and orange vegetables such as carrots, sweet potatoes, and winter squash.   Eat a variety of fruits every day.  Choose fresh or canned fruit (canned in its own juice or light syrup) instead of juice. Fruit juice has very little or no fiber.  Eat low-fat dairy foods.  Drink fat-free (skim) milk or 1% milk. Eat fat-free yogurt and low-fat cottage cheese. Try low-fat cheeses such as mozzarella and other reduced-fat cheeses.  Choose meat and other protein foods that are low in fat.  Choose beans or other legumes such as split peas or lentils. Choose fish, skinless poultry (chicken or turkey), or lean cuts of red meat (beef or pork). Before you cook meat or poultry, cut off any visible fat.   Use less fat and oil.  Try baking foods instead of frying them. Add less fat, such as margarine, sour cream, regular salad dressing and mayonnaise to foods. Eat fewer high-fat foods. Some  examples of high-fat foods include french fries, doughnuts, ice cream, and cakes.  Eat fewer sweets.  Limit foods and drinks that are high in sugar. This includes candy, cookies, regular soda, and sweetened drinks.  Exercise:  Exercise at least 30 minutes per day on most days of the week. Some examples of exercise include walking, biking, dancing, and swimming. You can also fit in more physical activity by taking the stairs instead of the elevator or parking farther away from stores. Ask your healthcare provider about the best exercise plan for you.      © Copyright [employer] 2018 Information is for End User's use only and may not be sold, redistributed or otherwise used for commercial purposes. All illustrations and images included in CareNotes® are the copyrighted property of [employer] or [employer]    Patient Education     Type 2 diabetes   The Basics   Written by the doctors and editors at [facility]   What is type 2 diabetes? -- This is a disorder that disrupts the way the body uses sugar. It is sometimes called type 2 diabetes mellitus.  All of the cells in the body need sugar to work normally. Sugar gets into the cells with the help of a hormone called insulin. Insulin is made by the pancreas, an organ in the belly. If there is not enough insulin, or if cells in the body don't respond normally to insulin, sugar builds up in the blood. That is what happens to people with diabetes.  There are 2 different types of diabetes:   In type 1 diabetes, the pancreas makes little or no insulin.   In type 2 diabetes, the pancreas still makes some insulin, but the cells in the body stop responding normally. Eventually, the pancreas cannot make enough insulin to keep up.  Having excess body weight or obesity increases a person's risk of developing type 2 diabetes. But people without excess body weight can get diabetes, too.  What are the symptoms of type 2 diabetes? -- Type 2 diabetes usually causes no  "symptoms. When symptoms do happen, they include:   Needing to urinate often   Intense thirst   Blurry vision  Can diabetes lead to other health problems? -- Yes. Type 2 diabetes might not make you feel sick. But if it is not managed, it can lead to serious problems over time, such as:   Heart attacks   Strokes   Kidney disease   Vision problems (or even blindness)   Pain or loss of feeling in the hands and feet   Needing to have fingers, toes, or other body parts removed (amputated)  How do I know if I have type 2 diabetes? -- Your doctor or nurse can do a blood test. There are 2 tests that can be used for this. Both involve measuring the amount of sugar in your blood, called your \"blood sugar\" or \"blood glucose\":   One of the tests measures your blood sugar at the time the blood sample is taken. This test is done in the morning. You can't eat or drink anything except water for at least 8 hours before the test.   The other test shows what your average blood sugar has been for the past 2 to 3 months. This blood test is called \"hemoglobin A1C\" or just \"A1C.\" It can be checked at any time of the day, even if you have recently eaten.  How is type 2 diabetes treated? -- The goals of treatment are to manage your blood sugar and lower the risk of future problems that can happen in people with diabetes.  Treatment might include:   Lifestyle changes - This is an important part of managing diabetes. It includes eating healthy foods and getting plenty of physical activity.   Medicines - There are a few medicines that help lower blood sugar. Some people need to take pills that help the body make more insulin or that help insulin do its job. Others need insulin shots.  Depending on what medicines you take, you might need to check your blood sugar regularly at home. But not everyone with type 2 diabetes needs to do this. Your doctor or nurse will tell you if you should be checking your blood sugar, and when and how to do " "this.  Sometimes, people with type 2 diabetes also need medicines to help prevent problems caused by the disease. For instance, medicines used to lower blood pressure can reduce the chances of a heart attack or stroke.   General medical care - It's also important to take care of other areas of your health. This includes watching your blood pressure and cholesterol levels. You should also get certain vaccines, such as vaccines to protect against the flu and coronavirus disease 2019 (\"COVID-19\"). Some people also need a vaccine to prevent pneumonia.  Can type 2 diabetes be prevented? -- Yes. To lower your chances of getting type 2 diabetes, the most important thing you can do is eat a healthy diet and get plenty of physical activity. This can help you lose weight if you are overweight. But eating well and being active are also good for your overall health. Even gentle activity, like walking, has benefits.  If you smoke, quitting can also lower your risk of type 2 diabetes. Quitting smoking can be difficult, but your doctor or nurse can help.  All topics are updated as new evidence becomes available and our peer review process is complete.  This topic retrieved from [employer] on: Apr 24, 2024.  Topic 45897 Version 23.0  Release: 32.3.2 - C32.113  © 2024 UpToDate, Inc. and/or its affiliates. All rights reserved.  Consumer Information Use and Disclaimer   Disclaimer: This generalized information is a limited summary of diagnosis, treatment, and/or medication information. It is not meant to be comprehensive and should be used as a tool to help the user understand and/or assess potential diagnostic and treatment options. It does NOT include all information about conditions, treatments, medications, side effects, or risks that may apply to a specific patient. It is not intended to be medical advice or a substitute for the medical advice, diagnosis, or treatment of a health care provider based on the health care provider's " examination and assessment of a patient's specific and unique circumstances. Patients must speak with a health care provider for complete information about their health, medical questions, and treatment options, including any risks or benefits regarding use of medications. This information does not endorse any treatments or medications as safe, effective, or approved for treating a specific patient. UpToDate, Inc. and its affiliates disclaim any warranty or liability relating to this information or the use thereof.The use of this information is governed by the Terms of Use, available at https://www.[employer].com/en/know/clinical-effectiveness-terms. 2024© UpToDate, Inc. and its affiliates and/or licensors. All rights reserved.  Copyright   © 2024 UpToDate, Inc. and/or its affiliates. All rights reserved.

## 2025-01-28 NOTE — PROGRESS NOTES
Name: Sreedhar Lagunas      : 1955      MRN: [id number]  Encounter Provider: Kamryn Chavez MD  Encounter Date: 2025   Encounter department: [facility]    Assessment & Plan  Type 2 diabetes mellitus with stage 3a chronic kidney disease and hypertension (HCC)  A1c increased, added Actos, discussed about weight loss medication like Ozempic and Wegovy, he is not interested  Lab Results   Component Value Date    HGBA1C 8.0 (H) 2025     Orders:  •  Diabetic foot exam; Future  •  pioglitazone (ACTOS) 30 mg tablet; Take 1 tablet (30 mg total) by mouth daily  •  VAS ARTERIAL DUPLEX- LOWER LIMB BILATERAL; Future  •  Albumin / creatinine urine ratio; Future  •  Comprehensive metabolic panel; Future  •  Hemoglobin A1C; Future  •  CBC and Platelet; Future  •  TSH, 3rd generation with Free T4 reflex; Future  •  Lipid Panel with Direct LDL reflex; Future    Morbid obesity with BMI of 40.0-44.9, adult (HCC)  Discussed about walking daily eating low-fat low sugar diet       Decreased pedal pulses  Decreased pedal pulse in the right foot, he has chronic swelling of the right lower leg for many years since he had injury many years ago  Orders:  •  VAS ARTERIAL DUPLEX- LOWER LIMB BILATERAL; Future    Callus of foot  Callus on the right foot, advised to see the podiatrist  Orders:  •  VAS ARTERIAL DUPLEX- LOWER LIMB BILATERAL; Future    Medicare annual wellness visit, subsequent       get the shingles vaccine from pharmacy  Benign essential hypertension  Lisinopril  Orders:  •  Comprehensive metabolic panel; Future    Mixed hyperlipidemia  Continue simvastatin and low-fat diet  Orders:  •  Lipid Panel with Direct LDL reflex; Future    Callus  Callus on the right foot on the side of the big toe, advised to see the podiatrist          Preventive health issues were discussed with patient, and age appropriate screening tests were ordered as noted in patient's After Visit Summary. Personalized health  advice and appropriate referrals for health education or preventive services given if needed, as noted in patient's After Visit Summary.    History of Present Illness     Follow-up on diabetes, and annual wellness visit, he has chronic right lower leg edema for about 40 years since he had some injury to the right leg, he does not feel any new changes in his health, lately he says he was eating more brownies  Taking all his medications, follows nephrologist, chronic kidney disease       Patient Care Team:  Kamryn Chavez MD as PCP - General  Kamryn Chavez MD as PCP - PCP-[facility] MD Nabor Gonzalez MD Chatargy Kaza, MD (Gastroenterology)  Charlene Amaya MD (Nephrology)  Amy Verdugo MD as Endoscopist  Charlene Amaya MD (Nephrology)    Review of Systems   Constitutional:  Negative for activity change, appetite change, chills, fatigue, fever and unexpected weight change.   HENT:  Negative for congestion, ear discharge, ear pain, nosebleeds, postnasal drip, rhinorrhea, sinus pressure, sneezing, sore throat, trouble swallowing and voice change.    Eyes:  Negative for photophobia, pain, discharge, redness and itching.   Respiratory:  Negative for cough, chest tightness, shortness of breath and wheezing.    Cardiovascular:  Negative for chest pain, palpitations and leg swelling.   Gastrointestinal:  Negative for abdominal pain, constipation, diarrhea, nausea and vomiting.   Endocrine: Negative for polyuria.   Genitourinary:  Negative for dysuria, frequency and urgency.   Musculoskeletal:  Negative for arthralgias, back pain, myalgias and neck pain.   Skin:  Negative for color change, pallor and rash.   Allergic/Immunologic: Negative for environmental allergies and food allergies.   Neurological:  Negative for dizziness, weakness, light-headedness and headaches.   Hematological:  Negative for adenopathy. Does not bruise/bleed easily.   Psychiatric/Behavioral:  Negative for  behavioral problems. The patient is not nervous/anxious.      Medical History Reviewed by provider this encounter:  Tobacco  Allergies  Meds  Problems  Med Hx  Surg Hx  Fam Hx       Annual Wellness Visit Questionnaire   Sreedhar is here for his Subsequent Wellness visit.     Health Risk Assessment:   Patient rates overall health as good. Patient feels that their physical health rating is same. Patient is satisfied with their life. Eyesight was rated as same. Hearing was rated as same. Patient feels that their emotional and mental health rating is same. Patients states they are never, rarely angry. Patient states they are never, rarely unusually tired/fatigued. Pain experienced in the last 7 days has been none.     Depression Screening:   PHQ-2 Score: 0      Fall Risk Screening:   In the past year, patient has experienced: no history of falling in past year      Home Safety:  Patient does not have trouble with stairs inside or outside of their home. Patient has working smoke alarms and has no working carbon monoxide detector. Home safety hazards include: none.     Nutrition:   Current diet is Diabetic.     Medications:   Patient is currently taking over-the-counter supplements. OTC medications include: see medication list. Patient is able to manage medications.     Activities of Daily Living (ADLs)/Instrumental Activities of Daily Living (IADLs):   Walk and transfer into and out of bed and chair?: Yes  Dress and groom yourself?: Yes    Bathe or shower yourself?: Yes    Feed yourself? Yes  Do your laundry/housekeeping?: Yes  Manage your money, pay your bills and track your expenses?: Yes  Make your own meals?: Yes    Do your own shopping?: Yes    Previous Hospitalizations:   Any hospitalizations or ED visits within the last 12 months?: No      Advance Care Planning:   Living will: No    Durable POA for healthcare: No    Advanced directive: No      Cognitive Screening:   Provider or family/friend/caregiver  concerned regarding cognition?: No    PREVENTIVE SCREENINGS      Cardiovascular Screening:    General: Screening Not Indicated, History Lipid Disorder and Screening Current      Diabetes Screening:     General: Screening Not Indicated, History Diabetes and Screening Current      Colorectal Cancer Screening:     General: Screening Current      Prostate Cancer Screening:    General: Screening Current      Osteoporosis Screening:    General: Screening Not Indicated      Abdominal Aortic Aneurysm (AAA) Screening:    Risk factors include: age between 65-76 yo        Lung Cancer Screening:     General: Screening Not Indicated      Hepatitis C Screening:    General: Screening Current    Screening, Brief Intervention, and Referral to Treatment (SBIRT)    Screening  Typical number of drinks in a day: 0  Typical number of drinks in a week: 0  Interpretation: Low risk drinking behavior.    Single Item Drug Screening:  How often have you used an illegal drug (including marijuana) or a prescription medication for non-medical reasons in the past year? never    Single Item Drug Screen Score: 0  Interpretation: Negative screen for possible drug use disorder    Other Counseling Topics:   Calcium and vitamin D intake and regular weightbearing exercise.     Social Drivers of Health     Financial Resource Strain: Low Risk  (1/10/2024)    Overall Financial Resource Strain (CARDIA)    • Difficulty of Paying Living Expenses: Not very hard   Food Insecurity: No Food Insecurity (1/28/2025)    Hunger Vital Sign    • Worried About Running Out of Food in the Last Year: Never true    • Ran Out of Food in the Last Year: Never true   Transportation Needs: No Transportation Needs (1/28/2025)    PRAPARE - Transportation    • Lack of Transportation (Medical): No    • Lack of Transportation (Non-Medical): No   Housing Stability: Unknown (1/28/2025)    Housing Stability Vital Sign    • Unable to Pay for Housing in the Last Year: No    • Homeless in  "the Last Year: No   Utilities: Not At Risk (1/28/2025)    [facility] Utilities    • Threatened with loss of utilities: No     No results found.    Objective   /70   Pulse 82   Resp 16   Ht 5' 8\" (1.727 m)   Wt 129 kg (285 lb)   SpO2 96%   BMI 43.33 kg/m²     Physical Exam  Vitals and nursing note reviewed.   Constitutional:       General: He is not in acute distress.     Appearance: Normal appearance.   HENT:      Head: Normocephalic and atraumatic.      Nose: No rhinorrhea.      Mouth/Throat:      Mouth: Mucous membranes are moist.      Pharynx: Oropharynx is clear. No posterior oropharyngeal erythema.   Eyes:      Extraocular Movements: Extraocular movements intact.      Conjunctiva/sclera: Conjunctivae normal.   Cardiovascular:      Rate and Rhythm: Normal rate and regular rhythm.      Pulses: Pulses are weak.           Dorsalis pedis pulses are 2+ on the right side and 2+ on the left side.      Heart sounds: No murmur heard.  Pulmonary:      Effort: Pulmonary effort is normal.      Breath sounds: Normal breath sounds. No wheezing or rales.   Abdominal:      General: Abdomen is flat. Bowel sounds are normal. There is no distension.      Palpations: Abdomen is soft. There is no mass.      Tenderness: There is no abdominal tenderness. There is no guarding.   Musculoskeletal:         General: No swelling, tenderness, deformity or signs of injury. Normal range of motion.      Cervical back: Normal range of motion and neck supple. No tenderness.   Feet:      Right foot:      Skin integrity: Callus present. No ulcer, skin breakdown, erythema, warmth or dry skin.      Left foot:      Skin integrity: No ulcer, skin breakdown, erythema, warmth, callus or dry skin.   Lymphadenopathy:      Cervical: No cervical adenopathy.   Skin:     Coloration: Skin is not jaundiced or pale.      Findings: No rash.   Neurological:      General: No focal deficit present.      Mental Status: He is alert and oriented to person, place, " and time.      Motor: No weakness.   Psychiatric:         Mood and Affect: Mood normal.         Behavior: Behavior normal.         Thought Content: Thought content normal.         Judgment: Judgment normal.     Diabetic Foot Exam    Patient's shoes and socks removed.    Right Foot/Ankle   Right Foot Inspection  Skin Exam: skin normal, callus and callus. Skin not intact, no dry skin, no warmth, no erythema, no maceration, no abnormal color, no pre-ulcer and no ulcer.     Toe Exam: ROM and strength within normal limits.     Sensory   Vibration: intact  Proprioception: intact  Monofilament testing: intact    Vascular  Capillary refills: < 3 seconds  The right DP pulse is 2+.     Left Foot/Ankle  Left Foot Inspection  Skin Exam: skin normal. Skin not intact, no dry skin, no warmth, no erythema, no maceration, normal color, no pre-ulcer, no ulcer and no callus.     Toe Exam: ROM and strength within normal limits.     Sensory   Vibration: intact  Proprioception: intact  Monofilament testing: intact    Vascular  Capillary refills: < 3 seconds  The left DP pulse is 2+.     Assign Risk Category  No deformity present  Loss of protective sensation  Weak pulses  Risk: 2

## 2025-02-04 DIAGNOSIS — N18.31 TYPE 2 DIABETES MELLITUS WITH STAGE 3A CHRONIC KIDNEY DISEASE, WITHOUT LONG-TERM CURRENT USE OF INSULIN (HCC): ICD-10-CM

## 2025-02-04 DIAGNOSIS — E11.22 TYPE 2 DIABETES MELLITUS WITH STAGE 3A CHRONIC KIDNEY DISEASE, WITHOUT LONG-TERM CURRENT USE OF INSULIN (HCC): ICD-10-CM

## 2025-02-04 RX ORDER — GLIMEPIRIDE 4 MG/1
4 TABLET ORAL
Qty: 90 TABLET | Refills: 1 | Status: SHIPPED | OUTPATIENT
Start: 2025-02-04 | End: 2026-01-30

## 2025-02-04 NOTE — TELEPHONE ENCOUNTER
Reason for call: Kamryn Chavez manage this medication!  Per Patient no refills left!    [x] Refill   [] Prior Auth  [] Other:     Office:   [x] PCP/Provider -   [] Specialty/Provider -     Medication:     glimepiride (AMARYL) 4 mg tablet        Dose/Frequency: Take 1 tablet (4 mg total) by mouth daily with breakfast,     Quantity:  90 tablet     Pharmacy: [facility]/pharmacy #[id number] [facility] - [address] [phone]     Does the patient have enough for 3 days?   [] Yes   [x] No - Send as HP to POD

## 2025-02-25 DIAGNOSIS — E78.2 MIXED HYPERLIPIDEMIA: ICD-10-CM

## 2025-02-25 NOTE — TELEPHONE ENCOUNTER
Reason for call:   [x] Refill   [] Prior Auth  [] Other:     Office:   [x] PCP/Provider -   [] Specialty/Provider -     Medication: simvastatin (ZOCOR) 10 mg    Dose/Frequency: TAKE 1 TABLET BY MOUTH EVERYDAY AT BEDTIME     Quantity: 90    Pharmacy: [facility]/pharmacy #[id number] [facility] - [address]     Does the patient have enough for 3 days?   [x] Yes   [] No - Send as HP to POD

## 2025-02-26 RX ORDER — SIMVASTATIN 10 MG
10 TABLET ORAL
Qty: 90 TABLET | Refills: 1 | Status: SHIPPED | OUTPATIENT
Start: 2025-02-26

## 2025-04-01 DIAGNOSIS — N40.0 ENLARGED PROSTATE: ICD-10-CM

## 2025-04-01 DIAGNOSIS — E78.5 HYPERLIPIDEMIA, UNSPECIFIED HYPERLIPIDEMIA TYPE: ICD-10-CM

## 2025-04-01 DIAGNOSIS — N20.0 NEPHROLITHIASIS: ICD-10-CM

## 2025-04-01 RX ORDER — TAMSULOSIN HYDROCHLORIDE 0.4 MG/1
0.4 CAPSULE ORAL
Qty: 90 CAPSULE | Refills: 1 | Status: SHIPPED | OUTPATIENT
Start: 2025-04-01

## 2025-04-01 NOTE — TELEPHONE ENCOUNTER
Reason for call:   [x] Refill   [] Prior Auth  [] Other:     Office:   [x] PCP/Provider -  Kamryn Chavez MD / ANIBAL HUBER   [] Specialty/Provider -     Medication: metFORMIN (GLUCOPHAGE) 850 mg tablet     Dose/Frequency: TAKE 1 TABLET BY MOUTH TWICE A DAY WITH FOOD     Quantity: 180    Pharmacy: [facility]/pharmacy #[id number] [address] [facility]   Does the patient have enough for 3 days?   [x] Yes   [] No - Send as HP to POD    Mail Away Pharmacy   Does the patient have enough for 10 days?   [] Yes   [] No - Send as HP to POD

## 2025-05-28 ENCOUNTER — APPOINTMENT (OUTPATIENT)
Dept: LAB | Facility: CLINIC | Age: 70
End: 2025-05-28
Attending: FAMILY MEDICINE
Payer: MEDICARE

## 2025-05-28 ENCOUNTER — RESULTS FOLLOW-UP (OUTPATIENT)
Dept: FAMILY MEDICINE CLINIC | Facility: CLINIC | Age: 70
End: 2025-05-28

## 2025-05-28 DIAGNOSIS — I10 BENIGN ESSENTIAL HYPERTENSION: ICD-10-CM

## 2025-05-28 DIAGNOSIS — E11.22 TYPE 2 DIABETES MELLITUS WITH STAGE 3A CHRONIC KIDNEY DISEASE AND HYPERTENSION (HCC): ICD-10-CM

## 2025-05-28 DIAGNOSIS — E78.2 MIXED HYPERLIPIDEMIA: ICD-10-CM

## 2025-05-28 DIAGNOSIS — N18.31 TYPE 2 DIABETES MELLITUS WITH STAGE 3A CHRONIC KIDNEY DISEASE AND HYPERTENSION (HCC): ICD-10-CM

## 2025-05-28 DIAGNOSIS — I12.9 TYPE 2 DIABETES MELLITUS WITH STAGE 3A CHRONIC KIDNEY DISEASE AND HYPERTENSION (HCC): ICD-10-CM

## 2025-05-28 LAB
ALBUMIN SERPL BCG-MCNC: 4.2 G/DL (ref 3.5–5)
ALP SERPL-CCNC: 63 U/L (ref 34–104)
ALT SERPL W P-5'-P-CCNC: 11 U/L (ref 7–52)
ANION GAP SERPL CALCULATED.3IONS-SCNC: 6 MMOL/L (ref 4–13)
AST SERPL W P-5'-P-CCNC: 17 U/L (ref 13–39)
BILIRUB SERPL-MCNC: 0.67 MG/DL (ref 0.2–1)
BUN SERPL-MCNC: 35 MG/DL (ref 5–25)
CALCIUM SERPL-MCNC: 9.3 MG/DL (ref 8.4–10.2)
CHLORIDE SERPL-SCNC: 101 MMOL/L (ref 96–108)
CHOLEST SERPL-MCNC: 128 MG/DL (ref ?–200)
CO2 SERPL-SCNC: 27 MMOL/L (ref 21–32)
CREAT SERPL-MCNC: 1.26 MG/DL (ref 0.6–1.3)
CREAT UR-MCNC: 22.1 MG/DL
ERYTHROCYTE [DISTWIDTH] IN BLOOD BY AUTOMATED COUNT: 14.6 % (ref 11.6–15.1)
EST. AVERAGE GLUCOSE BLD GHB EST-MCNC: 134 MG/DL
GFR SERPL CREATININE-BSD FRML MDRD: 57 ML/MIN/1.73SQ M
GLUCOSE P FAST SERPL-MCNC: 125 MG/DL (ref 65–99)
HBA1C MFR BLD: 6.3 %
HCT VFR BLD AUTO: 42.4 % (ref 36.5–49.3)
HDLC SERPL-MCNC: 53 MG/DL
HGB BLD-MCNC: 13.4 G/DL (ref 12–17)
LDLC SERPL CALC-MCNC: 58 MG/DL (ref 0–100)
MCH RBC QN AUTO: 29.2 PG (ref 26.8–34.3)
MCHC RBC AUTO-ENTMCNC: 31.6 G/DL (ref 31.4–37.4)
MCV RBC AUTO: 92 FL (ref 82–98)
MICROALBUMIN UR-MCNC: <7 MG/L
PLATELET # BLD AUTO: 166 THOUSANDS/UL (ref 149–390)
PMV BLD AUTO: 11 FL (ref 8.9–12.7)
POTASSIUM SERPL-SCNC: 4.5 MMOL/L (ref 3.5–5.3)
PROT SERPL-MCNC: 7 G/DL (ref 6.4–8.4)
RBC # BLD AUTO: 4.59 MILLION/UL (ref 3.88–5.62)
SODIUM SERPL-SCNC: 134 MMOL/L (ref 135–147)
TRIGL SERPL-MCNC: 87 MG/DL (ref ?–150)
TSH SERPL DL<=0.05 MIU/L-ACNC: 3.69 UIU/ML (ref 0.45–4.5)
WBC # BLD AUTO: 8 THOUSAND/UL (ref 4.31–10.16)

## 2025-05-28 PROCEDURE — 84443 ASSAY THYROID STIM HORMONE: CPT

## 2025-05-28 PROCEDURE — 36415 COLL VENOUS BLD VENIPUNCTURE: CPT

## 2025-05-28 PROCEDURE — 82043 UR ALBUMIN QUANTITATIVE: CPT

## 2025-05-28 PROCEDURE — 80053 COMPREHEN METABOLIC PANEL: CPT

## 2025-05-28 PROCEDURE — 82570 ASSAY OF URINE CREATININE: CPT

## 2025-05-28 PROCEDURE — 83036 HEMOGLOBIN GLYCOSYLATED A1C: CPT

## 2025-05-28 PROCEDURE — 80061 LIPID PANEL: CPT

## 2025-05-28 PROCEDURE — 85027 COMPLETE CBC AUTOMATED: CPT

## 2025-06-04 ENCOUNTER — OFFICE VISIT (OUTPATIENT)
Dept: FAMILY MEDICINE CLINIC | Facility: CLINIC | Age: 70
End: 2025-06-04
Payer: MEDICARE

## 2025-06-04 ENCOUNTER — TELEPHONE (OUTPATIENT)
Age: 70
End: 2025-06-04

## 2025-06-04 VITALS
WEIGHT: 307 LBS | OXYGEN SATURATION: 96 % | SYSTOLIC BLOOD PRESSURE: 148 MMHG | HEART RATE: 76 BPM | HEIGHT: 68 IN | DIASTOLIC BLOOD PRESSURE: 70 MMHG | BODY MASS INDEX: 46.53 KG/M2 | RESPIRATION RATE: 16 BRPM

## 2025-06-04 DIAGNOSIS — I10 BENIGN ESSENTIAL HYPERTENSION: ICD-10-CM

## 2025-06-04 DIAGNOSIS — M1A.9XX0 CHRONIC GOUT WITHOUT TOPHUS, UNSPECIFIED CAUSE, UNSPECIFIED SITE: ICD-10-CM

## 2025-06-04 DIAGNOSIS — E11.22 TYPE 2 DIABETES MELLITUS WITH STAGE 3A CHRONIC KIDNEY DISEASE AND HYPERTENSION (HCC): Primary | ICD-10-CM

## 2025-06-04 DIAGNOSIS — I12.9 TYPE 2 DIABETES MELLITUS WITH STAGE 3A CHRONIC KIDNEY DISEASE AND HYPERTENSION (HCC): Primary | ICD-10-CM

## 2025-06-04 DIAGNOSIS — E78.2 MIXED HYPERLIPIDEMIA: ICD-10-CM

## 2025-06-04 DIAGNOSIS — N18.31 TYPE 2 DIABETES MELLITUS WITH STAGE 3A CHRONIC KIDNEY DISEASE AND HYPERTENSION (HCC): Primary | ICD-10-CM

## 2025-06-04 PROCEDURE — 99214 OFFICE O/P EST MOD 30 MIN: CPT | Performed by: FAMILY MEDICINE

## 2025-06-04 PROCEDURE — G2211 COMPLEX E/M VISIT ADD ON: HCPCS | Performed by: FAMILY MEDICINE

## 2025-06-04 NOTE — PROGRESS NOTES
Name: Sreedhar Lagunas      : 1955      MRN: [id number]  Encounter Provider: Kamryn Chavez MD  Encounter Date: 2025   Encounter department: [facility] [place]  :  Assessment & Plan  Type 2 diabetes mellitus with stage 3a chronic kidney disease and hypertension (HCC)  He complains of low blood sugar after walking, and that is why he has is walking less, that caused him to gain weight  Lab Results   Component Value Date    HGBA1C 6.3 (H) 2025   A1c significantly improved since he has been on Actos for the last 6-month  Due to episodes of low blood sugar will stop the glimepiride.  And keep monitoring blood sugar and follow-up in a month to reevaluate him about his blood sugar and check on his weight  Actos dose might be reduced on his next visit or stop  F/u 1 month  Orders:  •  Comprehensive metabolic panel; Future  •  Hemoglobin A1C; Future  He has chronic edema in his right leg, there is no extra fluid retention  Benign essential hypertension  His home blood pressure readings are normal, continue same dose of lisinopril       Chronic gout without tophus, unspecified cause, unspecified site  Continue allopurinol, no recent gout attack  Orders:  •  Uric acid; Future    Mixed hyperlipidemia  Continue same medication and continue low-fat diet    f/u 1 month and then 4 month         History of Present Illness   He is diabetic, he has been taking Actos since January, he says he is also taking glimepiride and metformin, his blood sugar has been much better in the morning every day he checks send is mostly 110 range, but he says when he walks as he normally walks after walking few minutes he started feeling his blood sugar start going down then he has to start taking some energy drinks and he says because of less walking he thinks he gained weight and he checked his sugar when he was not feeling well with the walking and it was around 60,  He is on blood pressure readings are normal and he  "is on lisinopril.      Review of Systems   Constitutional:  Positive for unexpected weight change.   HENT: Negative.     Eyes: Negative.    Respiratory: Negative.     Cardiovascular: Negative.    Gastrointestinal: Negative.    Neurological: Negative.    Hematological: Negative.    Psychiatric/Behavioral: Negative.         Objective   /70   Pulse 76   Resp 16   Ht 5' 8\" (1.727 m)   Wt (!) 139 kg (307 lb)   SpO2 96%   BMI 46.68 kg/m²      Physical Exam  Vitals and nursing note reviewed.   Constitutional:       Appearance: He is obese.     Cardiovascular:      Rate and Rhythm: Normal rate.      Heart sounds: No murmur heard.  Pulmonary:      Effort: Pulmonary effort is normal.     Musculoskeletal:      Cervical back: Normal range of motion.      Right lower leg: Edema present.      Left lower leg: No edema.     Neurological:      General: No focal deficit present.     Psychiatric:         Mood and Affect: Mood normal.         "

## 2025-06-04 NOTE — TELEPHONE ENCOUNTER
Patient seen this morning for an appointment and was told [employer] not active.  He is calling stating he just got off the anupam with them and was told it is active.  They told patient that they have that issue a lot with [facility].  He was told to give us a phone number to call them.  [phone]    Thank you

## 2025-06-04 NOTE — ASSESSMENT & PLAN NOTE
He complains of low blood sugar after walking, and that is why he has is walking less, that caused him to gain weight  Lab Results   Component Value Date    HGBA1C 6.3 (H) 05/28/2025   A1c significantly improved since he has been on Actos for the last 6-month  Due to episodes of low blood sugar will stop the glimepiride.  And keep monitoring blood sugar and follow-up in a month to reevaluate him about his blood sugar and check on his weight  Actos dose might be reduced on his next visit or stop  F/u 1 month  Orders:  •  Comprehensive metabolic panel; Future  •  Hemoglobin A1C; Future  He has chronic edema in his right leg, there is no extra fluid retention

## 2025-06-29 DIAGNOSIS — N20.0 NEPHROLITHIASIS: ICD-10-CM

## 2025-06-29 DIAGNOSIS — I10 BENIGN ESSENTIAL HYPERTENSION: ICD-10-CM

## 2025-06-30 DIAGNOSIS — N20.0 NEPHROLITHIASIS: ICD-10-CM

## 2025-06-30 DIAGNOSIS — I10 BENIGN ESSENTIAL HYPERTENSION: ICD-10-CM

## 2025-06-30 RX ORDER — LISINOPRIL 10 MG/1
10 TABLET ORAL DAILY
Qty: 90 TABLET | Refills: 1 | OUTPATIENT
Start: 2025-06-30

## 2025-06-30 RX ORDER — ALLOPURINOL 100 MG/1
100 TABLET ORAL DAILY
Qty: 90 TABLET | Refills: 1 | OUTPATIENT
Start: 2025-06-30

## 2025-06-30 NOTE — TELEPHONE ENCOUNTER
Reason for call:   [x] Refill   [] Prior Auth  [] Other:     Office:   [x] PCP/Provider - Kathy  [] Specialty/Provider -     Medication:   Allopurinol 100 mg, 1 qd, 90  Lisinopril 10 mg, 1 qd, 90    Pharmacy:   [facility] [address]    Local Pharmacy   Does the patient have enough for 3 days?   [x] Yes   [] No - Send as HP to POD    Mail Away Pharmacy   Does the patient have enough for 10 days?   [] Yes   [] No - Send as HP to POD

## 2025-07-02 ENCOUNTER — HOSPITAL ENCOUNTER (OUTPATIENT)
Dept: NON INVASIVE DIAGNOSTICS | Facility: CLINIC | Age: 70
Discharge: HOME | End: 2025-07-02
Attending: FAMILY MEDICINE
Payer: MEDICARE

## 2025-07-02 DIAGNOSIS — R09.89 DECREASED PEDAL PULSES: ICD-10-CM

## 2025-07-02 DIAGNOSIS — E11.22 TYPE 2 DIABETES MELLITUS WITH STAGE 3A CHRONIC KIDNEY DISEASE AND HYPERTENSION (HCC): ICD-10-CM

## 2025-07-02 DIAGNOSIS — N18.31 TYPE 2 DIABETES MELLITUS WITH STAGE 3A CHRONIC KIDNEY DISEASE AND HYPERTENSION (HCC): ICD-10-CM

## 2025-07-02 DIAGNOSIS — L84 CALLUS OF FOOT: ICD-10-CM

## 2025-07-02 DIAGNOSIS — I12.9 TYPE 2 DIABETES MELLITUS WITH STAGE 3A CHRONIC KIDNEY DISEASE AND HYPERTENSION (HCC): ICD-10-CM

## 2025-07-02 PROCEDURE — 93925 LOWER EXTREMITY STUDY: CPT

## 2025-07-02 PROCEDURE — 93923 UPR/LXTR ART STDY 3+ LVLS: CPT

## 2025-07-02 PROCEDURE — 93925 LOWER EXTREMITY STUDY: CPT | Performed by: SURGERY

## 2025-07-02 PROCEDURE — 93922 UPR/L XTREMITY ART 2 LEVELS: CPT | Performed by: SURGERY

## 2025-07-02 RX ORDER — ALLOPURINOL 100 MG/1
100 TABLET ORAL DAILY
Qty: 90 TABLET | Refills: 1 | Status: SHIPPED | OUTPATIENT
Start: 2025-07-02

## 2025-07-02 RX ORDER — LISINOPRIL 10 MG/1
10 TABLET ORAL DAILY
Qty: 90 TABLET | Refills: 1 | Status: SHIPPED | OUTPATIENT
Start: 2025-07-02

## 2025-07-09 ENCOUNTER — OFFICE VISIT (OUTPATIENT)
Dept: FAMILY MEDICINE CLINIC | Facility: CLINIC | Age: 70
End: 2025-07-09
Payer: MEDICARE

## 2025-07-09 VITALS
DIASTOLIC BLOOD PRESSURE: 60 MMHG | OXYGEN SATURATION: 95 % | BODY MASS INDEX: 46.53 KG/M2 | SYSTOLIC BLOOD PRESSURE: 132 MMHG | WEIGHT: 307 LBS | HEART RATE: 85 BPM | HEIGHT: 68 IN | RESPIRATION RATE: 16 BRPM

## 2025-07-09 DIAGNOSIS — I12.9 TYPE 2 DIABETES MELLITUS WITH STAGE 3A CHRONIC KIDNEY DISEASE AND HYPERTENSION (HCC): ICD-10-CM

## 2025-07-09 DIAGNOSIS — E11.22 TYPE 2 DIABETES MELLITUS WITH STAGE 3A CHRONIC KIDNEY DISEASE AND HYPERTENSION (HCC): ICD-10-CM

## 2025-07-09 DIAGNOSIS — N18.31 TYPE 2 DIABETES MELLITUS WITH STAGE 3A CHRONIC KIDNEY DISEASE AND HYPERTENSION (HCC): ICD-10-CM

## 2025-07-09 DIAGNOSIS — R60.0 LEG EDEMA: Primary | ICD-10-CM

## 2025-07-09 PROCEDURE — 99214 OFFICE O/P EST MOD 30 MIN: CPT | Performed by: FAMILY MEDICINE

## 2025-07-09 PROCEDURE — G2211 COMPLEX E/M VISIT ADD ON: HCPCS | Performed by: FAMILY MEDICINE

## 2025-07-09 RX ORDER — FUROSEMIDE 20 MG/1
TABLET ORAL
Qty: 20 TABLET | Refills: 0 | Status: SHIPPED | OUTPATIENT
Start: 2025-07-09

## 2025-07-09 NOTE — PROGRESS NOTES
"Name: Sreedhar Lagunas      : 1955      MRN: [id number]  Encounter Provider: Kamryn Chavez MD  Encounter Date: 2025   Encounter department: [facility] [place]  :  Assessment & Plan  Leg edema    Orders:  •  furosemide (LASIX) 20 mg tablet; One tab in am  twice a week  He has chronic edema, advised compression stockings and low-salt diet, and he will try to take furosemide twice a week to help reduce some edema  He had arterial duplex which does not show any blood flow issue but he has diffuse disease throughout the femoral popliteal arteries but no blood flow problems in the feet  Type 2 diabetes mellitus with stage 3a chronic kidney disease and hypertension (HCC)    Lab Results   Component Value Date    HGBA1C 6.3 (H) 2025     Recheck in few months after labs              History of Present Illness   Follow-up on hypoglycemia, since glipizide has been stopped he has no more hypoglycemias, he is on metformin and Actos, he has chronic leg edema, not getting worse, he has not shortness of breath or chest pain, and this morning his sugar was above 140, he does not walk much      Review of Systems   Constitutional: Negative.    HENT: Negative.     Eyes: Negative.    Respiratory: Negative.     Cardiovascular: Negative.    Gastrointestinal: Negative.    Musculoskeletal: Negative.    Skin: Negative.        Objective   /60   Pulse 85   Resp 16   Ht 5' 8\" (1.727 m)   Wt (!) 139 kg (307 lb)   SpO2 95%   BMI 46.68 kg/m²      Physical Exam  Vitals and nursing note reviewed.   Constitutional:       Appearance: He is obese.     Cardiovascular:      Rate and Rhythm: Normal rate.      Heart sounds: No murmur heard.  Pulmonary:      Effort: Pulmonary effort is normal.      Breath sounds: No wheezing or rales.     Musculoskeletal:      Cervical back: Normal range of motion.      Right lower leg: Edema present.      Left lower leg: Edema present.     Neurological:      General: No focal " deficit present.     Psychiatric:         Mood and Affect: Mood normal.

## 2025-07-09 NOTE — ASSESSMENT & PLAN NOTE
Lab Results   Component Value Date    HGBA1C 6.3 (H) 05/28/2025     Recheck in few months after labs

## 2025-07-09 NOTE — ASSESSMENT & PLAN NOTE
Orders:  •  furosemide (LASIX) 20 mg tablet; One tab in am  twice a week  He has chronic edema, advised compression stockings and low-salt diet, and he will try to take furosemide twice a week to help reduce some edema  He had arterial duplex which does not show any blood flow issue but he has diffuse disease throughout the femoral popliteal arteries but no blood flow problems in the feet

## 2025-07-14 ENCOUNTER — APPOINTMENT (EMERGENCY)
Dept: RADIOLOGY | Facility: HOSPITAL | Age: 70
End: 2025-07-14
Payer: MEDICARE

## 2025-07-14 ENCOUNTER — HOSPITAL ENCOUNTER (INPATIENT)
Facility: HOSPITAL | Age: 70
LOS: 2 days | Discharge: HOME | End: 2025-07-16
Attending: EMERGENCY MEDICINE | Admitting: INTERNAL MEDICINE
Payer: MEDICARE

## 2025-07-14 DIAGNOSIS — L03.317 CELLULITIS OF BUTTOCK: ICD-10-CM
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
## 2025-07-14 DIAGNOSIS — R79.89 ELEVATED SERUM CREATININE: ICD-10-CM

## 2025-07-14 DIAGNOSIS — N39.0 URINARY TRACT INFECTION: ICD-10-CM

## 2025-07-14 PROBLEM — S31.829A WOUND OF LEFT BUTTOCK: Status: ACTIVE | Noted: 2025-07-14

## 2025-07-14 PROBLEM — N30.00 ACUTE CYSTITIS WITHOUT HEMATURIA: Status: ACTIVE | Noted: 2025-07-14

## 2025-07-14 LAB
ALBUMIN SERPL BCG-MCNC: 3.4 G/DL (ref 3.5–5)
ALP SERPL-CCNC: 80 U/L (ref 34–104)
ALT SERPL W P-5'-P-CCNC: 58 U/L (ref 7–52)
ANION GAP SERPL CALCULATED.3IONS-SCNC: 10 MMOL/L (ref 4–13)
APTT PPP: 30 SECONDS (ref 23–34)
AST SERPL W P-5'-P-CCNC: 55 U/L (ref 13–39)
BACTERIA UR QL AUTO: ABNORMAL /HPF
BASOPHILS # BLD AUTO: 0.06 THOUSANDS/ÂΜL (ref 0–0.1)
BASOPHILS NFR BLD AUTO: 1 % (ref 0–1)
BILIRUB SERPL-MCNC: 0.69 MG/DL (ref 0.2–1)
BILIRUB UR QL STRIP: NEGATIVE
BUN SERPL-MCNC: 58 MG/DL (ref 5–25)
CALCIUM ALBUM COR SERPL-MCNC: 9.7 MG/DL (ref 8.3–10.1)
CALCIUM SERPL-MCNC: 9.2 MG/DL (ref 8.4–10.2)
CHLORIDE SERPL-SCNC: 99 MMOL/L (ref 96–108)
CK SERPL-CCNC: 491 U/L (ref 39–308)
CLARITY UR: ABNORMAL
CO2 SERPL-SCNC: 24 MMOL/L (ref 21–32)
COLOR UR: ABNORMAL
CREAT SERPL-MCNC: 1.71 MG/DL (ref 0.6–1.3)
EOSINOPHIL # BLD AUTO: 0.19 THOUSAND/ÂΜL (ref 0–0.61)
EOSINOPHIL NFR BLD AUTO: 2 % (ref 0–6)
ERYTHROCYTE [DISTWIDTH] IN BLOOD BY AUTOMATED COUNT: 15.3 % (ref 11.6–15.1)
FLUAV AG UPPER RESP QL IA.RAPID: NEGATIVE
FLUBV AG UPPER RESP QL IA.RAPID: NEGATIVE
GFR SERPL CREATININE-BSD FRML MDRD: 39 ML/MIN/1.73SQ M
GLUCOSE SERPL-MCNC: 198 MG/DL (ref 65–140)
GLUCOSE SERPL-MCNC: 232 MG/DL (ref 65–140)
GLUCOSE SERPL-MCNC: 250 MG/DL (ref 65–140)
GLUCOSE SERPL-MCNC: 290 MG/DL (ref 65–140)
GLUCOSE UR STRIP-MCNC: NEGATIVE MG/DL
HCT VFR BLD AUTO: 39 % (ref 36.5–49.3)
HGB BLD-MCNC: 12.8 G/DL (ref 12–17)
HGB UR QL STRIP.AUTO: ABNORMAL
IMM GRANULOCYTES # BLD AUTO: 0.28 THOUSAND/UL (ref 0–0.2)
IMM GRANULOCYTES NFR BLD AUTO: 2 % (ref 0–2)
INR PPP: 1.13 (ref 0.85–1.19)
KETONES UR STRIP-MCNC: NEGATIVE MG/DL
LEUKOCYTE ESTERASE UR QL STRIP: ABNORMAL
LYMPHOCYTES # BLD AUTO: 1.85 THOUSANDS/ÂΜL (ref 0.6–4.47)
LYMPHOCYTES NFR BLD AUTO: 15 % (ref 14–44)
MCH RBC QN AUTO: 29.9 PG (ref 26.8–34.3)
MCHC RBC AUTO-ENTMCNC: 32.8 G/DL (ref 31.4–37.4)
MCV RBC AUTO: 91 FL (ref 82–98)
MONOCYTES # BLD AUTO: 1.18 THOUSAND/ÂΜL (ref 0.17–1.22)
MONOCYTES NFR BLD AUTO: 9 % (ref 4–12)
NEUTROPHILS # BLD AUTO: 9.08 THOUSANDS/ÂΜL (ref 1.85–7.62)
NEUTS SEG NFR BLD AUTO: 71 % (ref 43–75)
NITRITE UR QL STRIP: POSITIVE
NON-SQ EPI CELLS URNS QL MICRO: ABNORMAL /HPF
NRBC BLD AUTO-RTO: 0 /100 WBCS
PH UR STRIP.AUTO: 5.5 [PH]
PLATELET # BLD AUTO: 192 THOUSANDS/UL (ref 149–390)
PMV BLD AUTO: 11.3 FL (ref 8.9–12.7)
POTASSIUM SERPL-SCNC: 3.6 MMOL/L (ref 3.5–5.3)
PROT SERPL-MCNC: 6.8 G/DL (ref 6.4–8.4)
PROT UR STRIP-MCNC: ABNORMAL MG/DL
PROTHROMBIN TIME: 15.2 SECONDS (ref 12.3–15)
RBC # BLD AUTO: 4.28 MILLION/UL (ref 3.88–5.62)
RBC #/AREA URNS AUTO: ABNORMAL /HPF
SARS-COV+SARS-COV-2 AG RESP QL IA.RAPID: NEGATIVE
SODIUM SERPL-SCNC: 133 MMOL/L (ref 135–147)
SP GR UR STRIP.AUTO: 1.01 (ref 1–1.03)
UROBILINOGEN UR STRIP-ACNC: <2 MG/DL
WBC # BLD AUTO: 12.64 THOUSAND/UL (ref 4.31–10.16)
WBC #/AREA URNS AUTO: ABNORMAL /HPF
WBC CLUMPS # UR AUTO: PRESENT /UL

## 2025-07-14 PROCEDURE — 85025 COMPLETE CBC W/AUTO DIFF WBC: CPT

## 2025-07-14 PROCEDURE — 96361 HYDRATE IV INFUSION ADD-ON: CPT

## 2025-07-14 PROCEDURE — 87804 INFLUENZA ASSAY W/OPTIC: CPT

## 2025-07-14 PROCEDURE — 81001 URINALYSIS AUTO W/SCOPE: CPT

## 2025-07-14 PROCEDURE — 85730 THROMBOPLASTIN TIME PARTIAL: CPT

## 2025-07-14 PROCEDURE — 87811 SARS-COV-2 COVID19 W/OPTIC: CPT

## 2025-07-14 PROCEDURE — 99284 EMERGENCY DEPT VISIT MOD MDM: CPT

## 2025-07-14 PROCEDURE — 73502 X-RAY EXAM HIP UNI 2-3 VIEWS: CPT

## 2025-07-14 PROCEDURE — 87186 SC STD MICRODIL/AGAR DIL: CPT

## 2025-07-14 PROCEDURE — 80053 COMPREHEN METABOLIC PANEL: CPT

## 2025-07-14 PROCEDURE — 99285 EMERGENCY DEPT VISIT HI MDM: CPT | Performed by: EMERGENCY MEDICINE

## 2025-07-14 PROCEDURE — 71046 X-RAY EXAM CHEST 2 VIEWS: CPT

## 2025-07-14 PROCEDURE — 82550 ASSAY OF CK (CPK): CPT

## 2025-07-14 PROCEDURE — 82948 REAGENT STRIP/BLOOD GLUCOSE: CPT

## 2025-07-14 PROCEDURE — 87077 CULTURE AEROBIC IDENTIFY: CPT

## 2025-07-14 PROCEDURE — 87086 URINE CULTURE/COLONY COUNT: CPT

## 2025-07-14 PROCEDURE — 36415 COLL VENOUS BLD VENIPUNCTURE: CPT

## 2025-07-14 PROCEDURE — 99223 1ST HOSP IP/OBS HIGH 75: CPT | Performed by: HOSPITALIST

## 2025-07-14 PROCEDURE — 96365 THER/PROPH/DIAG IV INF INIT: CPT

## 2025-07-14 PROCEDURE — 93005 ELECTROCARDIOGRAM TRACING: CPT

## 2025-07-14 PROCEDURE — 85610 PROTHROMBIN TIME: CPT

## 2025-07-14 RX ORDER — SODIUM CHLORIDE, SODIUM GLUCONATE, SODIUM ACETATE, POTASSIUM CHLORIDE, MAGNESIUM CHLORIDE, SODIUM PHOSPHATE, DIBASIC, AND POTASSIUM PHOSPHATE .53; .5; .37; .037; .03; .012; .00082 G/100ML; G/100ML; G/100ML; G/100ML; G/100ML; G/100ML; G/100ML
75 INJECTION, SOLUTION INTRAVENOUS CONTINUOUS
Status: DISPENSED | OUTPATIENT
Start: 2025-07-14 | End: 2025-07-15

## 2025-07-14 RX ORDER — FUROSEMIDE 20 MG/1
20 TABLET ORAL 2 TIMES WEEKLY
Status: DISCONTINUED | OUTPATIENT
Start: 2025-07-14 | End: 2025-07-16 | Stop reason: HOSPADM

## 2025-07-14 RX ORDER — POLYETHYLENE GLYCOL 3350 17 G/17G
17 POWDER, FOR SOLUTION ORAL DAILY PRN
Status: DISCONTINUED | OUTPATIENT
Start: 2025-07-14 | End: 2025-07-16 | Stop reason: HOSPADM

## 2025-07-14 RX ORDER — INSULIN LISPRO 100 [IU]/ML
1-5 INJECTION, SOLUTION INTRAVENOUS; SUBCUTANEOUS
Status: DISCONTINUED | OUTPATIENT
Start: 2025-07-14 | End: 2025-07-15

## 2025-07-14 RX ORDER — LISINOPRIL 10 MG/1
10 TABLET ORAL DAILY
Status: DISCONTINUED | OUTPATIENT
Start: 2025-07-15 | End: 2025-07-16 | Stop reason: HOSPADM

## 2025-07-14 RX ORDER — TAMSULOSIN HYDROCHLORIDE 0.4 MG/1
0.4 CAPSULE ORAL
Status: DISCONTINUED | OUTPATIENT
Start: 2025-07-14 | End: 2025-07-16 | Stop reason: HOSPADM

## 2025-07-14 RX ORDER — ALLOPURINOL 100 MG/1
100 TABLET ORAL DAILY
Status: DISCONTINUED | OUTPATIENT
Start: 2025-07-15 | End: 2025-07-16 | Stop reason: HOSPADM

## 2025-07-14 RX ORDER — ACETAMINOPHEN 325 MG/1
650 TABLET ORAL EVERY 6 HOURS PRN
Status: DISCONTINUED | OUTPATIENT
Start: 2025-07-14 | End: 2025-07-16 | Stop reason: HOSPADM

## 2025-07-14 RX ORDER — HEPARIN SODIUM 5000 [USP'U]/ML
7500 INJECTION, SOLUTION INTRAVENOUS; SUBCUTANEOUS EVERY 8 HOURS SCHEDULED
Status: DISCONTINUED | OUTPATIENT
Start: 2025-07-14 | End: 2025-07-16 | Stop reason: HOSPADM

## 2025-07-14 RX ORDER — PRAVASTATIN SODIUM 20 MG
20 TABLET ORAL
Status: DISCONTINUED | OUTPATIENT
Start: 2025-07-14 | End: 2025-07-16 | Stop reason: HOSPADM

## 2025-07-14 RX ORDER — INSULIN GLARGINE 100 [IU]/ML
15 INJECTION, SOLUTION SUBCUTANEOUS
Status: DISCONTINUED | OUTPATIENT
Start: 2025-07-14 | End: 2025-07-16 | Stop reason: HOSPADM

## 2025-07-14 RX ADMIN — TAMSULOSIN HYDROCHLORIDE 0.4 MG: 0.4 CAPSULE ORAL at 16:51

## 2025-07-14 RX ADMIN — HEPARIN SODIUM 7500 UNITS: 5000 INJECTION INTRAVENOUS; SUBCUTANEOUS at 21:47

## 2025-07-14 RX ADMIN — SODIUM CHLORIDE 1000 ML: 0.9 INJECTION, SOLUTION INTRAVENOUS at 08:41

## 2025-07-14 RX ADMIN — CEFTRIAXONE 2000 MG: 10 INJECTION, POWDER, FOR SOLUTION INTRAVENOUS at 09:32

## 2025-07-14 RX ADMIN — INSULIN LISPRO 2 UNITS: 100 INJECTION, SOLUTION INTRAVENOUS; SUBCUTANEOUS at 21:46

## 2025-07-14 RX ADMIN — INSULIN GLARGINE 15 UNITS: 100 INJECTION, SOLUTION SUBCUTANEOUS at 21:46

## 2025-07-14 RX ADMIN — INSULIN LISPRO 2 UNITS: 100 INJECTION, SOLUTION INTRAVENOUS; SUBCUTANEOUS at 19:02

## 2025-07-14 RX ADMIN — SODIUM CHLORIDE, SODIUM GLUCONATE, SODIUM ACETATE, POTASSIUM CHLORIDE, MAGNESIUM CHLORIDE, SODIUM PHOSPHATE, DIBASIC, AND POTASSIUM PHOSPHATE 75 ML/HR: .53; .5; .37; .037; .03; .012; .00082 INJECTION, SOLUTION INTRAVENOUS at 12:45

## 2025-07-14 NOTE — ASSESSMENT & PLAN NOTE
On PE, 3 well circumcribsed erosion with deep black pigmentation noted on lateral L.buttock. Ill-defined erythema and minimal warmth. No drainage,induration or odor noted.    -Active infection vs chronic healing. Cellulitis unlikely given slight elevation in WBC, afebrile and absence of pain.     Plan:  1.IV Ceftriaxone 2000 mg Q24H, can consider Pseudomonal coverage but low suspicion at this time  2.Continue to monitor for signs of cellulitis including fever, elevated WBC, spreading erythema and warmth   3. IP consult to wound care

## 2025-07-14 NOTE — ED ATTENDING ATTESTATION
7/14/2025  INimesh DO, saw and evaluated the patient. I have discussed the patient with the resident/non-physician practitioner and agree with the resident's/non-physician practitioner's findings, Plan of Care, and MDM as documented in the resident's/non-physician practitioner's note, except where noted. All available labs and Radiology studies were reviewed.  I was present for key portions of any procedure(s) performed by the resident/non-physician practitioner and I was immediately available to provide assistance.       At this point I agree with the current assessment done in the Emergency Department.  I have conducted an independent evaluation of this patient a history and physical is as follows: 69yoM, pmhx per chart, presenting to ER after being down in home for approx 24-48hrs. Notes preceding dysuria, hesitancy. Notes went down on to knees. Did not hit head. Was too weak to get to feet thereafter. Friend found him today. Denies fever, chills, back pain, or any other trauma.     VSS. Head atraumatic, normocephalic. AAOX4, CN intact, moving all extremities purposefully. B/L tympanic membranes clear. Nares without septal hematoma and clear. No intraoral trauma. Jaw full ROM. 2+ Radial BUE and 2+ PT BLE. C, T, L spine without step off, tenderness, deformity. Lungs CTA. Clavicles intact. Chest, abd, pelvis without tenderness to palpation. Joints full ROM.     MDM: 69yoM presenting to ER with weakness and sx 2/2 UTI. Ceftriaxone provided. CBC w/ slight WBC elevation. CMP dehydration. Fluids provided. CK elevated but not rhabdo. Without traumatic findings on exam. Admitted to [facility].     ED Course         Critical Care Time  Procedures

## 2025-07-14 NOTE — ED PROCEDURE NOTE
Procedure  POC Cardiac US    Date/Time: 7/14/2025 10:20 AM    Performed by: Kusum Moss MD  Authorized by: Kusum Moss MD    Procedure details:     Exam Type:  Transthoracic Echocardiography (TTE), limited    Purpose of Exam: diagnostic and educational    Indications: heart failure      Assessment / Evaluation for: cardiac function and pericardial effusion      Exam Type: initial exam      Image quality: limited diagnostic      Image availability:  Images available in PACS and video obtained  Patient Details:     Cardiac Rhythm:  Regular    Mechanical ventilation: No    Cardiac findings:     Echo modes evaluated: limited 2D      Views obtained: parasternal long axis, parasternal short axis and subcostal      Pericardial effusion: trace      Tamponade physiology: absent      Wall motion: normal      LV systolic function: normal                     Kusum Moss MD  07/16/25 [id number]

## 2025-07-14 NOTE — ED PROVIDER NOTES
Time reflects when diagnosis was documented in both MDM as applicable and the Disposition within this note       Time User Action Codes Description Comment    7/14/2025  9:30 AM Roly Shafer [W19.XXXA] Fall, initial encounter     7/14/2025 10:14 AM Roly Shafer [R79.89] Elevated serum creatinine     7/14/2025 10:15 AM Roly Shafer [N39.0] Urinary tract infection     7/14/2025 10:15 AM Rloy Shafer [L03.317] Cellulitis of buttock           ED Disposition       ED Disposition   Admit    Condition   Stable    Date/Time   Mon Jul 14, 2025  9:30 AM    Comment   Case was discussed with Spencer and the patient's admission status was agreed to be Admission Status: inpatient status to the service of   .               Assessment & Plan       Medical Decision Making  69-year-old male presents status post fall for which he spent 40 hours on the ground  At risk for hyperkalemia, rhabdomyolysis, muscle breakdown, sprain, strain, osseous fracture, electrolyte abnormality, urinary tract infection, hyperglycemia, etc.  Chest x-ray shows no acute cardiopulmonary abnormalities with no displaced fractures  X-ray hip and pelvis of the left shows mild to moderate bilateral hip osteoarthritis with no acute osseous abnormalities  EKG shows normal sinus rhythm with rate of 82, no T wave changes associated with hyperkalemia  Flu/COVID rapid antigen pan negative  PT and APTT within normal limits  CK shows elevation at 491 not elevated enough to be considered rhabdomyolysis but definitely elevated  CBC shows mild leukocytosis with no anemia  CMP shows mild hyponatremia with elevation in kidney numbers borderline for LIANA  Patient given 1 L normal saline bolus  UA shows evidence of acute urinary tract infection  Patient started on Rocephin for UTI and possible cellulitis of left [place]  Patient to be admitted inpatient, discussed all laboratory test and imaging  Patient has no further questions at this time, in stable condition  and cleared for admission      Amount and/or Complexity of Data Reviewed  Labs: ordered.    Risk  Decision regarding hospitalization.             Medications   allopurinol (ZYLOPRIM) tablet 100 mg (has no administration in time range)   furosemide (LASIX) tablet 20 mg (0 mg Oral Hold 7/14/25 1200)   lisinopril (ZESTRIL) tablet 10 mg ( Oral Held by provider 7/14/25 1150)   pravastatin (PRAVACHOL) tablet 20 mg ( Oral Held by provider 7/14/25 1214)   tamsulosin (FLOMAX) capsule 0.4 mg (has no administration in time range)   acetaminophen (TYLENOL) tablet 650 mg (has no administration in time range)   polyethylene glycol (MIRALAX) packet 17 g (has no administration in time range)   heparin (porcine) subcutaneous injection 7,500 Units (7,500 Units Subcutaneous Not Given 7/14/25 1317)   ceftriaxone (ROCEPHIN) 2 g/50 mL in dextrose IVPB (has no administration in time range)   multi-electrolyte (Plasmalyte-A/Isolyte-S PH 7.4/Normosol-R) IV solution (75 mL/hr Intravenous New Bag 7/14/25 1245)   insulin lispro (HumALOG/ADMELOG) 100 units/mL subcutaneous injection 1-5 Units ( Subcutaneous Not Given 7/14/25 1339)   insulin lispro (HumALOG/ADMELOG) 100 units/mL subcutaneous injection 1-5 Units (has no administration in time range)   sodium chloride 0.9 % bolus 1,000 mL (0 mL Intravenous Stopped 7/14/25 1035)   ceftriaxone (ROCEPHIN) 2 g/50 mL in dextrose IVPB (0 mg Intravenous Stopped 7/14/25 1035)       ED Risk Strat Scores                    (ISAR) Identification of Seniors at Risk  Before the illness or injury that brought you to the Emergency, did you need someone to help you on a regular basis?: 0  In the last 24 hours, have you needed more help than usual?: 0  Have you been hospitalized for one or more nights during the past 6 months?: 0  In general, do you see well?: 1  In general, do you have serious problems with your memory?: 0  Do you take more than three different medications every day?: 1  ISAR Score:  2            SBIRT 22yo+      Flowsheet Row Most Recent Value   Initial Alcohol Screen: US AUDIT-C     1. How often do you have a drink containing alcohol? 0 Filed at: 07/14/2025 0817   2. How many drinks containing alcohol do you have on a typical day you are drinking?  0 Filed at: 07/14/2025 0817   3b. FEMALE Any Age, or MALE 65+: How often do you have 4 or more drinks on one occassion? 0 Filed at: 07/14/2025 0817   Audit-C Score 0 Filed at: 07/14/2025 0817   JUAN: How many times in the past year have you...    Used an illegal drug or used a prescription medication for non-medical reasons? Never Filed at: 07/14/2025 0817                            History of Present Illness       Chief Complaint   Patient presents with    Fall     Pt states he fell Wednesday 10pm and could not get up until Friday 1pm with help from neighbor. Pt attempted to rehydrate at home. Sunday, noticed he was having difficulty urinating +cloudy urine        Past Medical History[1]   Past Surgical History[2]   Family History[3]   Social History[4]   E-Cigarette/Vaping    E-Cigarette Use Never User       E-Cigarette/Vaping Substances      I have reviewed and agree with the history as documented.     68 yo male presents s/p fall with fatigue and urinary changes. Pt states he had a mechanical fall at home Wednesday night while attempting ot use the restroom. Pt states he was unable to get up on his own and was on the ground from 10pm weds night till approx 1pm Friday afternoon. Pt states he was able to slide around on the floor but was unable to get food, water, or help. Pt now complains of cloudy and darkened urine with decreased urinary control. Pt denies fever, chills, n/v, sob, CP, abdominal pain.       Fall  Associated symptoms: no abdominal pain, no back pain, no chest pain, no seizures and no vomiting        Review of Systems   Constitutional:  Positive for activity change and fatigue. Negative for chills and fever.   HENT:  Negative for  ear pain and sore throat.    Eyes:  Negative for pain and visual disturbance.   Respiratory:  Negative for cough and shortness of breath.    Cardiovascular:  Negative for chest pain and palpitations.   Gastrointestinal:  Negative for abdominal pain and vomiting.   Genitourinary:  Positive for decreased urine volume, difficulty urinating, frequency and urgency. Negative for dysuria and hematuria.   Musculoskeletal:  Negative for arthralgias and back pain.   Skin:  Positive for color change. Negative for rash.   Neurological:  Negative for dizziness, seizures, syncope and light-headedness.   All other systems reviewed and are negative.          Objective       ED Triage Vitals [07/14/25 0814]   Temperature Pulse Blood Pressure Respirations SpO2 Patient Position - Orthostatic VS   97.7 °F (36.5 °C) 83 139/63 18 97 % Sitting      Temp Source Heart Rate Source BP Location FiO2 (%) Pain Score    Oral Monitor Right arm -- 1      Vitals      Date and Time Temp Pulse SpO2 Resp BP Pain Score FACES Pain Rating User   07/14/25 1530 -- -- -- -- -- No Pain -- JT   07/14/25 1522 -- -- -- -- -- No Pain --    07/14/25 1516 97.9 °F (36.6 °C) 85 97 % 20 123/58 -- -- JT   07/14/25 1446 -- 79 99 % 17 142/68 -- -- KB   07/14/25 1030 -- 83 98 % 18 134/63 -- -- EM   07/14/25 0814 97.7 °F (36.5 °C) 83 97 % 18 139/63 1 -- LS            Physical Exam  Vitals and nursing note reviewed.   Constitutional:       General: He is not in acute distress.     Appearance: He is well-developed.   HENT:      Head: Normocephalic and atraumatic.     Eyes:      Conjunctiva/sclera: Conjunctivae normal.       Cardiovascular:      Rate and Rhythm: Normal rate and regular rhythm.      Heart sounds: No murmur heard.  Pulmonary:      Effort: Pulmonary effort is normal. No respiratory distress.      Breath sounds: Normal breath sounds.   Abdominal:      Palpations: Abdomen is soft.      Tenderness: There is no abdominal tenderness.     Musculoskeletal:          General: No swelling.      Cervical back: Neck supple.     Skin:     General: Skin is warm and dry.      Capillary Refill: Capillary refill takes less than 2 seconds.      Coloration: Skin is not jaundiced.      Findings: Bruising and erythema present.     Neurological:      Mental Status: He is alert.     Psychiatric:         Mood and Affect: Mood normal.         Results Reviewed       Procedure Component Value Units Date/Time    Fingerstick Glucose (POCT) [[id number]]  (Abnormal) Collected: 07/14/25 1326    Lab Status: Final result Specimen: Blood Updated: 07/14/25 1327     POC Glucose 198 mg/dl     Platelet count [[id number]]     Lab Status: No result Specimen: Blood     FLU/COVID Rapid Antigen (30 min. TAT) - Preferred screening test in ED [[id number]]  (Normal) Collected: 07/14/25 0839    Lab Status: Final result Specimen: Nares from Nose Updated: 07/14/25 0944     SARS COV Rapid Antigen Negative     Influenza A Rapid Antigen Negative     Influenza B Rapid Antigen Negative    Narrative:      This test has been performed using the [employer] Uma 2 FLU+SARS Antigen test under the Emergency Use Authorization (EUA). This test has been validated by the  and verified by the performing laboratory. The Uma uses lateral flow immunofluorescent sandwich assay to detect SARS-COV, Influenza A and Influenza B Antigen.     The [employer] Uma 2 SARS Antigen test does not differentiate between SARS-CoV and SARS-CoV-2.     Negative results are presumptive and may be confirmed with a molecular assay, if necessary, for patient management. Negative results do not rule out SARS-CoV-2 or influenza infection and should not be used as the sole basis for treatment or patient management decisions. A negative test result may occur if the level of antigen in a sample is below the limit of detection of this test.     Positive results are indicative of the presence of viral antigens, but do not rule out bacterial infection or  co-infection with other viruses.     All test results should be used as an adjunct to clinical observations and other information available to the provider.    FOR PEDIATRIC PATIENTS - copy/paste COVID Guidelines URL to browser: [email or web address]    Protime-INR [[id number]]  (Abnormal) Collected: 07/14/25 0839    Lab Status: Final result Specimen: Blood from Arm, Left Updated: 07/14/25 0914     Protime 15.2 seconds      INR 1.13    Narrative:      INR Therapeutic Range    Indication                                             INR Range      Atrial Fibrillation                                               2.0-3.0  Hypercoagulable State                                    2.0.2.3  Left Ventricular Asist Device                            2.0-3.0  Mechanical Heart Valve                                  -    Aortic(with afib, MI, embolism, HF, LA enlargement,    and/or coagulopathy)                                     2.0-3.0 (2.5-3.5)     Mitral                                                             2.5-3.5  Prosthetic/Bioprosthetic Heart Valve               2.0-3.0  Venous thromboembolism (VTE: VT, PE        2.0-3.0    APTT [[id number]]  (Normal) Collected: 07/14/25 0839    Lab Status: Final result Specimen: Blood from Arm, Left Updated: 07/14/25 0914     PTT 30 seconds     Urine Microscopic [[id number]]  (Abnormal) Collected: 07/14/25 0840    Lab Status: Final result Specimen: Urine, Clean Catch Updated: 07/14/25 0913     RBC, UA 1-2 /hpf      WBC, UA Innumerable /hpf      Epithelial Cells Occasional /hpf      Bacteria, UA Occasional /hpf      WBC Clumps Present    Urine culture [[id number]] Collected: 07/14/25 0840    Lab Status: In process Specimen: Urine, Clean Catch Updated: 07/14/25 0913    CK [[id number]]  (Abnormal) Collected: 07/14/25 0839    Lab Status: Final result Specimen: Blood from Arm, Left Updated: 07/14/25 0913     Total  U/L      Comprehensive metabolic panel [[id number]]  (Abnormal) Collected: 07/14/25 0839    Lab Status: Final result Specimen: Blood from Arm, Left Updated: 07/14/25 0913     Sodium 133 mmol/L      Potassium 3.6 mmol/L      Chloride 99 mmol/L      CO2 24 mmol/L      ANION GAP 10 mmol/L      BUN 58 mg/dL      Creatinine 1.71 mg/dL      Glucose 232 mg/dL      Calcium 9.2 mg/dL      Corrected Calcium 9.7 mg/dL      AST 55 U/L      ALT 58 U/L      Alkaline Phosphatase 80 U/L      Total Protein 6.8 g/dL      Albumin 3.4 g/dL      Total Bilirubin 0.69 mg/dL      eGFR 39 ml/min/1.73sq m     Narrative:      National Kidney Disease Foundation guidelines for Chronic Kidney Disease (CKD):     Stage 1 with normal or high GFR (GFR > 90 mL/min/1.73 square meters)    Stage 2 Mild CKD (GFR = 60-89 mL/min/1.73 square meters)    Stage 3A Moderate CKD (GFR = 45-59 mL/min/1.73 square meters)    Stage 3B Moderate CKD (GFR = 30-44 mL/min/1.73 square meters)    Stage 4 Severe CKD (GFR = 15-29 mL/min/1.73 square meters)    Stage 5 End Stage CKD (GFR <15 mL/min/1.73 square meters)  Note: GFR calculation is accurate only with a steady state creatinine    UA w Reflex to Microscopic w Reflex to Culture [[id number]]  (Abnormal) Collected: 07/14/25 0840    Lab Status: Final result Specimen: Urine, Clean Catch Updated: 07/14/25 0908     Color, UA Light Yellow     Clarity, UA Turbid     Specific Gravity, UA 1.014     pH, UA 5.5     Leukocytes, UA Large     Nitrite, UA Positive     Protein, UA Trace mg/dl      Glucose, UA Negative mg/dl      Ketones, UA Negative mg/dl      Urobilinogen, UA <2.0 mg/dl      Bilirubin, UA Negative     Occult Blood, UA Small    CBC and differential [[id number]]  (Abnormal) Collected: 07/14/25 0839    Lab Status: Final result Specimen: Blood from Arm, Left Updated: 07/14/25 0852     WBC 12.64 Thousand/uL      RBC 4.28 Million/uL      Hemoglobin 12.8 g/dL      Hematocrit 39.0 %      MCV 91 fL      MCH 29.9 pg      MCHC 32.8 g/dL       RDW 15.3 %      MPV 11.3 fL      Platelets 192 Thousands/uL      nRBC 0 /100 WBCs      Segmented % 71 %      Immature Grans % 2 %      Lymphocytes % 15 %      Monocytes % 9 %      Eosinophils Relative 2 %      Basophils Relative 1 %      Absolute Neutrophils 9.08 Thousands/µL      Absolute Immature Grans 0.28 Thousand/uL      Absolute Lymphocytes 1.85 Thousands/µL      Absolute Monocytes 1.18 Thousand/µL      Eosinophils Absolute 0.19 Thousand/µL      Basophils Absolute 0.06 Thousands/µL             XR hip/pelv 2-3 vws left if performed   Final Interpretation by Omar Wise MD (07/14 4890)      Mild to moderate bilateral hip osteoarthritis.   No acute osseous abnormality         Computerized Assisted Algorithm (CAA) may have been used to analyze all applicable images.            Workstation performed: [id number]         XR chest 2 views   Final Interpretation by Layla Maldonado MD (07/14 1434)      No acute pulmonary pathology.      No obvious displaced fractures within limitation of supine AP chest technique.            Workstation performed: [id number]             ECG 12 Lead Documentation Only    Date/Time: 7/14/2025 5:44 PM    Performed by: Roly Shafer DO  Authorized by: Roly Shafer DO    Indications / Diagnosis:  Concern for hyperkalemia due to prolonged downtime after fall  ECG reviewed by me, the ED Provider: yes    Patient location:  ED  Previous ECG:     Previous ECG:  Compared to current    Comparison ECG info:  November 4, 2016    Similarity:  Changes noted    Comparison to cardiac monitor: Yes    Interpretation:     Interpretation: non-specific    Rate:     ECG rate:  82    ECG rate assessment: normal    Rhythm:     Rhythm: sinus rhythm    Ectopy:     Ectopy: none    QRS:     QRS axis:  Normal  Conduction:     Conduction: normal    ST segments:     ST segments:  Normal  T waves:     T waves: non-specific    Comments:      Sinus rhythm with rate of 82.  No ST segment or T wave changes indicative  of ischemia.  Mild T wave changes noted when compared to previous EKG.  No peaked T waves associated with hyperkalemia      ED Medication and Procedure Management   Prior to Admission Medications   Prescriptions Last Dose Informant Patient Reported? Taking?   CINNAMON PO Past Week Self Yes Yes   Sig: Take 1,000 mg by mouth in the morning.   Multiple Vitamin (multivitamin) capsule 7/14/2025 Self Yes Yes   Sig: Take 1 capsule by mouth in the morning.   allopurinol (ZYLOPRIM) 100 mg tablet 7/14/2025  No Yes   Sig: Take 1 tablet (100 mg total) by mouth daily   furosemide (LASIX) 20 mg tablet Not Taking  No No   Sig: One tab in am  twice a week   Patient not taking: Reported on 7/14/2025   lisinopril (ZESTRIL) 10 mg tablet 7/14/2025  No Yes   Sig: Take 1 tablet (10 mg total) by mouth daily   metFORMIN (GLUCOPHAGE) 850 mg tablet 7/14/2025  No Yes   Sig: Take 1 tablet (850 mg total) by mouth 2 (two) times a day with meals   pioglitazone (ACTOS) 30 mg tablet 7/14/2025  No Yes   Sig: Take 1 tablet (30 mg total) by mouth daily   simvastatin (ZOCOR) 10 mg tablet 7/13/2025  No Yes   Sig: Take 1 tablet (10 mg total) by mouth daily at bedtime   tamsulosin (FLOMAX) 0.4 mg 7/13/2025  No Yes   Sig: Take 1 capsule (0.4 mg total) by mouth daily with dinner      Facility-Administered Medications: None     Current Discharge Medication List        CONTINUE these medications which have NOT CHANGED    Details   allopurinol (ZYLOPRIM) 100 mg tablet Take 1 tablet (100 mg total) by mouth daily  Qty: 90 tablet, Refills: 1    Associated Diagnoses: Nephrolithiasis      CINNAMON PO Take 1,000 mg by mouth in the morning.      lisinopril (ZESTRIL) 10 mg tablet Take 1 tablet (10 mg total) by mouth daily  Qty: 90 tablet, Refills: 1    Associated Diagnoses: Benign essential hypertension      metFORMIN (GLUCOPHAGE) 850 mg tablet Take 1 tablet (850 mg total) by mouth 2 (two) times a day with meals  Qty: 180 tablet, Refills: 1    Associated Diagnoses:  Hyperlipidemia, unspecified hyperlipidemia type      Multiple Vitamin (multivitamin) capsule Take 1 capsule by mouth in the morning.      pioglitazone (ACTOS) 30 mg tablet Take 1 tablet (30 mg total) by mouth daily  Qty: 100 tablet, Refills: 3    Associated Diagnoses: Type 2 diabetes mellitus with stage 3a chronic kidney disease and hypertension (HCC)      simvastatin (ZOCOR) 10 mg tablet Take 1 tablet (10 mg total) by mouth daily at bedtime  Qty: 90 tablet, Refills: 1    Associated Diagnoses: Mixed hyperlipidemia      tamsulosin (FLOMAX) 0.4 mg Take 1 capsule (0.4 mg total) by mouth daily with dinner  Qty: 90 capsule, Refills: 1    Associated Diagnoses: Nephrolithiasis; Enlarged prostate      furosemide (LASIX) 20 mg tablet One tab in am  twice a week  Qty: 20 tablet, Refills: 0    Associated Diagnoses: Leg edema           No discharge procedures on file.  ED SEPSIS DOCUMENTATION   Time reflects when diagnosis was documented in both MDM as applicable and the Disposition within this note       Time User Action Codes Description Comment    7/14/2025  9:30 AM Roly Shafer [W19.XXXA] Fall, initial encounter     7/14/2025 10:14 AM Roly Shafer [R79.89] Elevated serum creatinine     7/14/2025 10:15 AM Roly Shafer [N39.0] Urinary tract infection     7/14/2025 10:15 AM Roly Shafer [L03.317] Cellulitis of buttock                    [1]   Past Medical History:  Diagnosis Date    Diabetes mellitus (HCC)     Gout     Hypertension     Leukocytosis     Onset:4/26/2012    Onychomycosis     Onse:4/26/2012    Other muscle spasm     Renal calculi     Shoulder joint pain    [2]   Past Surgical History:  Procedure Laterality Date    CARPAL TUNNEL RELEASE Bilateral     Last Assessed:9/26/2014    CYSTOSCOPY      EYE SURGERY  09/04/2019    Detached retina    HERNIA REPAIR      Femoral Hernia Repair Unilateral    SC COLONOSCOPY FLX DX W/COLLJ SPEC WHEN PFRMD N/A 12/13/2018    Procedure: COLONOSCOPY;  Surgeon: Amy  MD Kartik;  Location: [facility];  Service: Gastroenterology    AL CYSTO/URETERO W/LITHOTRIPSY &INDWELL STENT INSRT Left 8/11/2016    Procedure: CYSTOSCOPY URETEROSCOPY,RETROGRADE PYELOGRAM AND INSERTION STENT URETERAL;  Surgeon: Sigifredo Lozada MD;  Location: [facility];  Service: Urology    AL LITHOTRIPSY XTRCORP SHOCK WAVE Right 11/17/2016    Procedure: ESWL;  Surgeon: Sigifredo Lozada MD;  Location: [facility];  Service: Urology   [3]   Family History  Problem Relation Name Age of Onset    Coronary artery disease Mother      Lung cancer Mother      Heart disease Father      Lung cancer Father      Cancer Father      Stomach cancer Brother      Prostate cancer Brother     [4]   Social History  Tobacco Use    Smoking status: Never    Smokeless tobacco: Never   Vaping Use    Vaping status: Never Used   Substance Use Topics    Alcohol use: Not Currently    Drug use: Tiana Shafer DO  07/14/25 2244

## 2025-07-14 NOTE — H&P
"H&P - Hospitalist   Name: Sreedhar Lagunas 69 y.o. male I MRN: [id number]  Unit/Bed#: ED-43 I Date of Admission: 7/14/2025   Date of Service: 7/14/2025 I Hospital Day: 0     Assessment & Plan  Acute cystitis without hematuria  Dysuria, urgency, frequency since Wednesday. Not associated with fever,chills, N/V. No hx of UTI    Received 1x dose of IV Ceftriaxone 2000 in ED. Afebrile. WBC: 12.64    UA:+large leukocytes, + nitrites, trace protein  Urine microscopic: Innumerable WBC, + WBC clumps    No suprapubic tenderness on PE    Plans:  1.IV Ceftriaxone 2000 Q24H  2.F/u on urine culture  3.Continue home Tamsulosin 0.4   4.Trend CK and repeat in AM  5.Daily CMP  LIANA (acute kidney injury) ([facility])  Patient was down from Wednesday night to Friday afternoon without food or water    Likely pre-renal 2/2 decreased PO intake. Could consider ATN given elevated CK but less likely    In ED,  Cr: 1.71 (baseline 1.1-1.3)  CK:491    Plan:  1.Gentle Fluid hydration, can consider lasix at a later time for chronic edema  2.Hold home Metformin and Lisinopril    Wound of left buttock  On PE, 3 well circumcribsed erosion with deep black pigmentation noted on lateral L.buttock. Ill-defined erythema and minimal warmth. No drainage,induration or odor noted.    -Active infection vs chronic healing. Cellulitis unlikely given slight elevation in WBC, afebrile and absence of pain.     Plan:  1.IV Ceftriaxone 2000 mg Q24H, can consider Pseudomonal coverage but low suspicion at this time  2.Continue to monitor for signs of cellulitis including fever, elevated WBC, spreading erythema and warmth   3. IP consult to wound care      Type 2 diabetes mellitus with stage 3a chronic kidney disease and hypertension ([facility])  Lab Results   Component Value Date    HGBA1C 6.3 (H) 05/28/2025       No results for input(s): \"POCGLU\" in the last 72 hours.    Blood Sugar Average: Last 72 hrs:    Patient noted that while being down Wednesday-Friday, he did not take his " home meds. Resumed on Friday and noted BS's to be in 200's. On admission, Glucose:232 and currently asymptomatic.    Patient on Metformin 850 and Actos 30 daily at home.    A1C: 6.3      Plan:  1.Hold home meds and start SSI  Benign essential hypertension  BP stable 130's/60's  Patient took home Lisinopril 10 this morning before presentation     Plan:  1.Hold home Lisinopril given LIANA  Hyperlipidemia  Slight elevation in AST and ALT.    Plan:  1.Hold home Simvastatin and reassess after 24 hr observation  2.Daily CMP       VTE Pharmacologic Prophylaxis:   High Risk (Score >/= 5) - Pharmacological DVT Prophylaxis Ordered: heparin. Sequential Compression Devices Ordered.  Code Status: Prior Level 2  Discussion with family: No family present at bedside..     Anticipated Length of Stay: Patient will be admitted on an observation basis with an anticipated length of stay of less than 2 midnights secondary to UTI,LIANA and possible skin infection.    History of Present Illness   Chief Complaint: Dysuria, Frequency and Urgency and Fall    Sreedhar Lagunas is a 69 y.o. male with a PMH of T2DM, CKD 3a, HTN,HLD, and chronic tophus gout who presents to the ED after being found down.    Beginning on Wednesday, he began experiencing urinary urgency, frequency and dysuria that was not associated with blood in the urine, fever or chills. He denied history of UTI. He saw his PCP earlier that day for his chronic leg edema. Later that day, he began feeling dazed and weak in his lower extremities and fell backwards. Just prior to the fall he had incontinence of his bowels. There was no known loss of consciousness or headstrike. He denied prodromal symptoms of SOB, CP, and palpitations. He lives alone and was unable to get up from the fall until Friday when a neighborhood came in and checked on him. During his time down, he had no food, water or his regular medications. Beginning Friday evening, he was drinking water and soft foods such  as yogurt and jello throughout the weekend. He continued to not feel at his baseline and came to the ED.    Review of Systems   Constitutional:  Negative for chills and fever.   Eyes:  Negative for photophobia and visual disturbance.   Respiratory:  Negative for chest tightness, shortness of breath and wheezing.    Cardiovascular:  Positive for leg swelling. Negative for chest pain and palpitations.   Gastrointestinal:  Negative for abdominal distention, abdominal pain, nausea and vomiting.   Genitourinary:  Positive for difficulty urinating, dysuria, frequency and urgency. Negative for hematuria.       Historical Information   Past Medical History[1]  Past Surgical History[2]  Social History[3]  E-Cigarette/Vaping    E-Cigarette Use Never User      E-Cigarette/Vaping Substances     Family History[4]  Social History:  Marital Status: Single   Occupation: unknown   Patient Pre-hospital Living Situation: Home  Patient Pre-hospital Level of Mobility: walks with cane  Patient Pre-hospital Diet Restrictions: None    Meds/Allergies   I have reviewed home medications with patient personally.  Prior to Admission medications    Medication Sig Start Date End Date Taking? Authorizing Provider   allopurinol (ZYLOPRIM) 100 mg tablet Take 1 tablet (100 mg total) by mouth daily 7/2/25   Kamryn Chavez MD   CINNAMON PO Take 1,000 mg by mouth in the morning.    Historical Provider, MD   furosemide (LASIX) 20 mg tablet One tab in am  twice a week 7/9/25   Kamryn Chavez MD   lisinopril (ZESTRIL) 10 mg tablet Take 1 tablet (10 mg total) by mouth daily 7/2/25   Kamryn Chavez MD   metFORMIN (GLUCOPHAGE) 850 mg tablet Take 1 tablet (850 mg total) by mouth 2 (two) times a day with meals 4/2/25   Kamryn Chavez MD   Multiple Vitamin (multivitamin) capsule Take 1 capsule by mouth in the morning.    Historical Provider, MD   pioglitazone (ACTOS) 30 mg tablet Take 1 tablet (30 mg total) by mouth daily 1/28/25   Kamryn Chavez MD   simvastatin (ZOCOR)  10 mg tablet Take 1 tablet (10 mg total) by mouth daily at bedtime 2/26/25   Kamryn Chavez MD   tamsulosin (FLOMAX) 0.4 mg Take 1 capsule (0.4 mg total) by mouth daily with dinner 4/1/25   Charlene Amaya MD     Allergies   Allergen Reactions    Penicillins      Unknown as child       Objective :  Temp:  [97.7 °F (36.5 °C)] 97.7 °F (36.5 °C)  HR:  [83] 83  BP: (134-139)/(63) 134/63  Resp:  [18] 18  SpO2:  [97 %-98 %] 98 %  O2 Device: None (Room air)    Physical Exam  Constitutional:       General: He is not in acute distress.     Appearance: He is obese. He is not toxic-appearing.   HENT:      Head: Normocephalic.      Mouth/Throat:      Mouth: Mucous membranes are dry.     Eyes:      Extraocular Movements: Extraocular movements intact.      Conjunctiva/sclera: Conjunctivae normal.       Cardiovascular:      Rate and Rhythm: Normal rate and regular rhythm.      Heart sounds: No murmur heard.  Pulmonary:      Effort: Pulmonary effort is normal. No respiratory distress.      Breath sounds: No wheezing.   Abdominal:      General: Abdomen is flat. There is no distension.      Tenderness: There is no abdominal tenderness. There is no guarding.     Musculoskeletal:      Right lower leg: Edema (+1) present.      Left lower leg: Edema (+1) present.     Skin:     Findings: Bruising (b/l knees) and erythema (b/l knees) present.      Comments: L.buttock has 3 well circumscribed black erosions with  ill-defined surrounding erythema      Neurological:      General: No focal deficit present.      Mental Status: He is alert and oriented to person, place, and time.     Psychiatric:         Mood and Affect: Mood normal.          Lines/Drains:            Lab Results: I have reviewed the following results:  Results from last 7 days   Lab Units 07/14/25  0839   WBC Thousand/uL 12.64*   HEMOGLOBIN g/dL 12.8   HEMATOCRIT % 39.0   PLATELETS Thousands/uL 192   SEGS PCT % 71   LYMPHO PCT % 15   MONO PCT % 9   EOS PCT % 2     Results from last 7  days   Lab Units 07/14/25  0839   SODIUM mmol/L 133*   POTASSIUM mmol/L 3.6   CHLORIDE mmol/L 99   CO2 mmol/L 24   BUN mg/dL 58*   CREATININE mg/dL 1.71*   ANION GAP mmol/L 10   CALCIUM mg/dL 9.2   ALBUMIN g/dL 3.4*   TOTAL BILIRUBIN mg/dL 0.69   ALK PHOS U/L 80   ALT U/L 58*   AST U/L 55*   GLUCOSE RANDOM mg/dL 232*     Results from last 7 days   Lab Units 07/14/25  0839   INR  1.13         Lab Results   Component Value Date    HGBA1C 6.3 (H) 05/28/2025    HGBA1C 8.0 (H) 01/22/2025    HGBA1C 7.3 (H) 09/13/2024           Imaging Results Review: I reviewed radiology reports from this admission including: chest xray, xray(s), and Ultrasound(s).  Other Study Results Review: EKG was reviewed.     Administrative Statements       ** Please Note: This note has been constructed using a voice recognition system. **         [1]   Past Medical History:  Diagnosis Date    Diabetes mellitus (HCC)     Gout     Hypertension     Leukocytosis     Onset:4/26/2012    Onychomycosis     Onse:4/26/2012    Other muscle spasm     Renal calculi     Shoulder joint pain    [2]   Past Surgical History:  Procedure Laterality Date    CARPAL TUNNEL RELEASE Bilateral     Last Assessed:9/26/2014    CYSTOSCOPY      EYE SURGERY  09/04/2019    Detached retina    HERNIA REPAIR      Femoral Hernia Repair Unilateral    KY COLONOSCOPY FLX DX W/COLLJ SPEC WHEN PFRMD N/A 12/13/2018    Procedure: COLONOSCOPY;  Surgeon: Amy Verdugo MD;  Location: [facility];  Service: Gastroenterology    KY CYSTO/URETERO W/LITHOTRIPSY &INDWELL STENT INSRT Left 8/11/2016    Procedure: CYSTOSCOPY URETEROSCOPY,RETROGRADE PYELOGRAM AND INSERTION STENT URETERAL;  Surgeon: Sigifredo Lozada MD;  Location: [facility];  Service: Urology    KY LITHOTRIPSY XTRCORP SHOCK WAVE Right 11/17/2016    Procedure: ESWL;  Surgeon: Sigifredo Lozada MD;  Location: [facility];  Service: Urology   [3]   Social History  Tobacco Use    Smoking status: Never    Smokeless tobacco: Never   Vaping Use     Vaping status: Never Used   Substance and Sexual Activity    Alcohol use: Not Currently    Drug use: No   [4]   Family History  Problem Relation Name Age of Onset    Coronary artery disease Mother      Lung cancer Mother      Heart disease Father      Lung cancer Father      Cancer Father      Stomach cancer Brother      Prostate cancer Brother

## 2025-07-14 NOTE — PLAN OF CARE
Problem: Potential for Falls  Goal: Patient will remain free of falls  Description: INTERVENTIONS:  - Educate patient/family on patient safety including physical limitations  - Instruct patient to call for assistance with activity   - Consider consulting OT/PT to assist with strengthening/mobility based on AM PAC & JH-HLM score  - Consult OT/PT to assist with strengthening/mobility   - Keep Call bell within reach  - Keep bed low and locked with side rails adjusted as appropriate  - Keep care items and personal belongings within reach  - Initiate and maintain comfort rounds  - Make Fall Risk Sign visible to staff  - Offer Toileting every  Hours, in advance of need  - Initiate/Maintain alarm  - Obtain necessary fall risk management equipment:   - Apply yellow socks and bracelet for high fall risk patients  - Consider moving patient to room near nurses station  Outcome: Progressing     Problem: PAIN - ADULT  Goal: Verbalizes/displays adequate comfort level or baseline comfort level  Description: Interventions:  - Encourage patient to monitor pain and request assistance  - Assess pain using appropriate pain scale  - Administer analgesics as ordered based on type and severity of pain and evaluate response  - Implement non-pharmacological measures as appropriate and evaluate response  - Consider cultural and social influences on pain and pain management  - Notify physician/advanced practitioner if interventions unsuccessful or patient reports new pain  - Educate patient/family on pain management process including their role and importance of  reporting pain   - Provide non-pharmacologic/complimentary pain relief interventions  Outcome: Progressing     Problem: INFECTION - ADULT  Goal: Absence or prevention of progression during hospitalization  Description: INTERVENTIONS:  - Assess and monitor for signs and symptoms of infection  - Monitor lab/diagnostic results  - Monitor all insertion sites, i.e. indwelling lines,  tubes, and drains  - Monitor endotracheal if appropriate and nasal secretions for changes in amount and color  - [place] appropriate cooling/warming therapies per order  - Administer medications as ordered  - Instruct and encourage patient and family to use good hand hygiene technique  - Identify and instruct in appropriate isolation precautions for identified infection/condition  Outcome: Progressing  Goal: Absence of fever/infection during neutropenic period  Description: INTERVENTIONS:  - Monitor WBC  - Perform strict hand hygiene  - Limit to healthy visitors only  - No plants, dried, fresh or silk flowers with thao in patient room  Outcome: Progressing     Problem: SAFETY ADULT  Goal: Patient will remain free of falls  Description: INTERVENTIONS:  - Educate patient/family on patient safety including physical limitations  - Instruct patient to call for assistance with activity   - Consider consulting OT/PT to assist with strengthening/mobility based on AM PAC & -HLM score  - Consult OT/PT to assist with strengthening/mobility   - Keep Call bell within reach  - Keep bed low and locked with side rails adjusted as appropriate  - Keep care items and personal belongings within reach  - Initiate and maintain comfort rounds  - Make Fall Risk Sign visible to staff  - Offer Toileting every  Hours, in advance of need  - Initiate/Maintain alarm  - Obtain necessary fall risk management equipment:   - Apply yellow socks and bracelet for high fall risk patients  - Consider moving patient to room near nurses station  Outcome: Progressing  Goal: Maintain or return to baseline ADL function  Description: INTERVENTIONS:  -  Assess patient's ability to carry out ADLs; assess patient's baseline for ADL function and identify physical deficits which impact ability to perform ADLs (bathing, care of mouth/teeth, toileting, grooming, dressing, etc.)  - Assess/evaluate cause of self-care deficits   - Assess range of motion  - Assess  patient's mobility; develop plan if impaired  - Assess patient's need for assistive devices and provide as appropriate  - Encourage maximum independence but intervene and supervise when necessary  - Involve family in performance of ADLs  - Assess for home care needs following discharge   - Consider OT consult to assist with ADL evaluation and planning for discharge  - Provide patient education as appropriate  - Monitor functional capacity and physical performance, use of AM PAC & JH-HLM   - Monitor gait, balance and fatigue with ambulation    Outcome: Progressing  Goal: Maintains/Returns to pre admission functional level  Description: INTERVENTIONS:  - Perform AM-PAC 6 Click Basic Mobility/ Daily Activity assessment daily.  - Set and communicate daily mobility goal to care team and patient/family/caregiver.   - Collaborate with rehabilitation services on mobility goals if consulted  - Perform Range of Motion  times a day.  - Reposition patient every  hours.  - Dangle patient  times a day  - Stand patient times a day  - Ambulate patient  times a day  - Out of bed to chair  times a day   - Out of bed for meals times a day  - Out of bed for toileting  - Record patient progress and toleration of activity level   Outcome: Progressing     Problem: DISCHARGE PLANNING  Goal: Discharge to home or other facility with appropriate resources  Description: INTERVENTIONS:  - Identify barriers to discharge w/patient and caregiver  - Arrange for needed discharge resources and transportation as appropriate  - Identify discharge learning needs (meds, wound care, etc.)  - Arrange for interpretive services to assist at discharge as needed  - Refer to Case Management Department for coordinating discharge planning if the patient needs post-hospital services based on physician/advanced practitioner order or complex needs related to functional status, cognitive ability, or social support system  Outcome: Progressing     Problem: Knowledge  Deficit  Goal: Patient/family/caregiver demonstrates understanding of disease process, treatment plan, medications, and discharge instructions  Description: Complete learning assessment and assess knowledge base.  Interventions:  - Provide teaching at level of understanding  - Provide teaching via preferred learning methods  Outcome: Progressing

## 2025-07-14 NOTE — ASSESSMENT & PLAN NOTE
BP stable 130's/60's  Patient took home Lisinopril 10 this morning before presentation     Plan:  1.Hold home Lisinopril given LIANA

## 2025-07-14 NOTE — ASSESSMENT & PLAN NOTE
Dysuria, urgency, frequency since Wednesday. Not associated with fever,chills, N/V. No hx of UTI    Received 1x dose of IV Ceftriaxone 2000 in ED. Afebrile. WBC: 12.64    UA:+large leukocytes, + nitrites, trace protein  Urine microscopic: Innumerable WBC, + WBC clumps    No suprapubic tenderness on PE    Plans:  1.IV Ceftriaxone 2000 Q24H  2.F/u on urine culture  3.Continue home Tamsulosin 0.4   4.Trend CK and repeat in AM  5.Daily CMP

## 2025-07-14 NOTE — ED PROCEDURE NOTE
Procedure  POC Renal US    Date/Time: 7/14/2025 10:23 AM    Performed by: Kusum Moss MD  Authorized by: Kusum Moss MD    Patient location:  ED  Performed by:  Resident  Procedure details:     Exam Type:  Diagnostic    Indications: urinary tract infection      Views obtained: bladder (transverse and sagittal), left kidney and right kidney      Image quality: limited diagnostic    Findings:     LEFT kidney findings: renal cyst      LEFT hydronephrosis: none      RIGHT kidney findings: unremarkable      RIGHT hydronephrosis: none      Bladder:  Visualized    Bladder findings comment:  Debris in bladder                   Kusum Moss MD  07/16/25 0950

## 2025-07-14 NOTE — ASSESSMENT & PLAN NOTE
Slight elevation in AST and ALT.    Plan:  1.Hold home Simvastatin and reassess after 24 hr observation  2.Daily CMP

## 2025-07-14 NOTE — ASSESSMENT & PLAN NOTE
Patient was down from Wednesday night to Friday afternoon without food or water    Likely pre-renal 2/2 decreased PO intake. Could consider ATN given elevated CK but less likely    In ED,  Cr: 1.71 (baseline 1.1-1.3)  CK:491    Plan:  1.Gentle Fluid hydration, can consider lasix at a later time for chronic edema  2.Hold home Metformin and Lisinopril

## 2025-07-14 NOTE — ASSESSMENT & PLAN NOTE
"Lab Results   Component Value Date    HGBA1C 6.3 (H) 05/28/2025       No results for input(s): \"POCGLU\" in the last 72 hours.    Blood Sugar Average: Last 72 hrs:    Patient noted that while being down Wednesday-Friday, he did not take his home meds. Resumed on Friday and noted BS's to be in 200's. On admission, Glucose:232 and currently asymptomatic.    Patient on Metformin 850 and Actos 30 daily at home.    A1C: 6.3      Plan:  1.Hold home meds and start SSI  "

## 2025-07-15 LAB
ALBUMIN SERPL BCG-MCNC: 3.2 G/DL (ref 3.5–5)
ALP SERPL-CCNC: 73 U/L (ref 34–104)
ALT SERPL W P-5'-P-CCNC: 51 U/L (ref 7–52)
ANION GAP SERPL CALCULATED.3IONS-SCNC: 7 MMOL/L (ref 4–13)
ANISOCYTOSIS BLD QL SMEAR: PRESENT
AST SERPL W P-5'-P-CCNC: 35 U/L (ref 13–39)
BASOPHILS # BLD MANUAL: 0 THOUSAND/UL (ref 0–0.1)
BASOPHILS NFR MAR MANUAL: 0 % (ref 0–1)
BILIRUB SERPL-MCNC: 0.48 MG/DL (ref 0.2–1)
BUN SERPL-MCNC: 47 MG/DL (ref 5–25)
CALCIUM ALBUM COR SERPL-MCNC: 9.6 MG/DL (ref 8.3–10.1)
CALCIUM SERPL-MCNC: 9 MG/DL (ref 8.4–10.2)
CHLORIDE SERPL-SCNC: 105 MMOL/L (ref 96–108)
CK SERPL-CCNC: 171 U/L (ref 39–308)
CO2 SERPL-SCNC: 27 MMOL/L (ref 21–32)
CREAT SERPL-MCNC: 1.51 MG/DL (ref 0.6–1.3)
EOSINOPHIL # BLD MANUAL: 0 THOUSAND/UL (ref 0–0.4)
EOSINOPHIL NFR BLD MANUAL: 0 % (ref 0–6)
ERYTHROCYTE [DISTWIDTH] IN BLOOD BY AUTOMATED COUNT: 15.4 % (ref 11.6–15.1)
GFR SERPL CREATININE-BSD FRML MDRD: 46 ML/MIN/1.73SQ M
GLUCOSE SERPL-MCNC: 208 MG/DL (ref 65–140)
GLUCOSE SERPL-MCNC: 220 MG/DL (ref 65–140)
GLUCOSE SERPL-MCNC: 231 MG/DL (ref 65–140)
GLUCOSE SERPL-MCNC: 235 MG/DL (ref 65–140)
GLUCOSE SERPL-MCNC: 247 MG/DL (ref 65–140)
HCT VFR BLD AUTO: 37.6 % (ref 36.5–49.3)
HGB BLD-MCNC: 12 G/DL (ref 12–17)
LG PLATELETS BLD QL SMEAR: PRESENT
LYMPHOCYTES # BLD AUTO: 17 % (ref 14–44)
LYMPHOCYTES # BLD AUTO: 2.34 THOUSAND/UL (ref 0.6–4.47)
MCH RBC QN AUTO: 29.3 PG (ref 26.8–34.3)
MCHC RBC AUTO-ENTMCNC: 31.9 G/DL (ref 31.4–37.4)
MCV RBC AUTO: 92 FL (ref 82–98)
METAMYELOCYTE ABSOLUTE CT: 0.12 THOUSAND/UL (ref 0–0.1)
METAMYELOCYTES NFR BLD MANUAL: 1 % (ref 0–1)
MONOCYTES # BLD AUTO: 1.35 THOUSAND/UL (ref 0–1.22)
MONOCYTES NFR BLD: 11 % (ref 4–12)
MYELOCYTE ABSOLUTE CT: 0.37 THOUSAND/UL (ref 0–0.1)
MYELOCYTES NFR BLD MANUAL: 3 % (ref 0–1)
NEUTROPHILS # BLD MANUAL: 8.12 THOUSAND/UL (ref 1.85–7.62)
NEUTS SEG NFR BLD AUTO: 66 % (ref 43–75)
PLATELET # BLD AUTO: 211 THOUSANDS/UL (ref 149–390)
PLATELET BLD QL SMEAR: ADEQUATE
PMV BLD AUTO: 11 FL (ref 8.9–12.7)
POTASSIUM SERPL-SCNC: 4.3 MMOL/L (ref 3.5–5.3)
PROT SERPL-MCNC: 6.4 G/DL (ref 6.4–8.4)
RBC # BLD AUTO: 4.1 MILLION/UL (ref 3.88–5.62)
RBC MORPH BLD: PRESENT
SODIUM SERPL-SCNC: 139 MMOL/L (ref 135–147)
VARIANT LYMPHS # BLD AUTO: 2 %
WBC # BLD AUTO: 12.3 THOUSAND/UL (ref 4.31–10.16)

## 2025-07-15 PROCEDURE — 82948 REAGENT STRIP/BLOOD GLUCOSE: CPT

## 2025-07-15 PROCEDURE — 85027 COMPLETE CBC AUTOMATED: CPT

## 2025-07-15 PROCEDURE — 99232 SBSQ HOSP IP/OBS MODERATE 35: CPT | Performed by: INTERNAL MEDICINE

## 2025-07-15 PROCEDURE — 82550 ASSAY OF CK (CPK): CPT | Performed by: INTERNAL MEDICINE

## 2025-07-15 PROCEDURE — 80053 COMPREHEN METABOLIC PANEL: CPT

## 2025-07-15 PROCEDURE — 85007 BL SMEAR W/DIFF WBC COUNT: CPT

## 2025-07-15 RX ORDER — INSULIN LISPRO 100 [IU]/ML
1-5 INJECTION, SOLUTION INTRAVENOUS; SUBCUTANEOUS
Status: DISCONTINUED | OUTPATIENT
Start: 2025-07-15 | End: 2025-07-16 | Stop reason: HOSPADM

## 2025-07-15 RX ORDER — INSULIN LISPRO 100 [IU]/ML
1-5 INJECTION, SOLUTION INTRAVENOUS; SUBCUTANEOUS
Status: DISCONTINUED | OUTPATIENT
Start: 2025-07-15 | End: 2025-07-15

## 2025-07-15 RX ORDER — INSULIN LISPRO 100 [IU]/ML
5 INJECTION, SOLUTION INTRAVENOUS; SUBCUTANEOUS
Status: DISCONTINUED | OUTPATIENT
Start: 2025-07-15 | End: 2025-07-16 | Stop reason: HOSPADM

## 2025-07-15 RX ORDER — HYDRALAZINE HYDROCHLORIDE 20 MG/ML
10 INJECTION INTRAMUSCULAR; INTRAVENOUS EVERY 6 HOURS PRN
Status: DISCONTINUED | OUTPATIENT
Start: 2025-07-15 | End: 2025-07-16 | Stop reason: HOSPADM

## 2025-07-15 RX ADMIN — INSULIN LISPRO 5 UNITS: 100 INJECTION, SOLUTION INTRAVENOUS; SUBCUTANEOUS at 18:35

## 2025-07-15 RX ADMIN — HEPARIN SODIUM 7500 UNITS: 5000 INJECTION INTRAVENOUS; SUBCUTANEOUS at 14:28

## 2025-07-15 RX ADMIN — INSULIN LISPRO 1 UNITS: 100 INJECTION, SOLUTION INTRAVENOUS; SUBCUTANEOUS at 16:56

## 2025-07-15 RX ADMIN — INSULIN GLARGINE 15 UNITS: 100 INJECTION, SOLUTION SUBCUTANEOUS at 22:16

## 2025-07-15 RX ADMIN — HEPARIN SODIUM 7500 UNITS: 5000 INJECTION INTRAVENOUS; SUBCUTANEOUS at 22:16

## 2025-07-15 RX ADMIN — INSULIN LISPRO 1 UNITS: 100 INJECTION, SOLUTION INTRAVENOUS; SUBCUTANEOUS at 22:16

## 2025-07-15 RX ADMIN — CEFTRIAXONE 2000 MG: 10 INJECTION, POWDER, FOR SOLUTION INTRAVENOUS at 08:43

## 2025-07-15 RX ADMIN — INSULIN LISPRO 2 UNITS: 100 INJECTION, SOLUTION INTRAVENOUS; SUBCUTANEOUS at 12:44

## 2025-07-15 RX ADMIN — ALLOPURINOL 100 MG: 100 TABLET ORAL at 08:42

## 2025-07-15 RX ADMIN — INSULIN LISPRO 2 UNITS: 100 INJECTION, SOLUTION INTRAVENOUS; SUBCUTANEOUS at 08:44

## 2025-07-15 RX ADMIN — TAMSULOSIN HYDROCHLORIDE 0.4 MG: 0.4 CAPSULE ORAL at 16:56

## 2025-07-15 RX ADMIN — HEPARIN SODIUM 7500 UNITS: 5000 INJECTION INTRAVENOUS; SUBCUTANEOUS at 05:04

## 2025-07-16 VITALS
BODY MASS INDEX: 45.47 KG/M2 | HEIGHT: 68 IN | TEMPERATURE: 97.8 F | WEIGHT: 300 LBS | DIASTOLIC BLOOD PRESSURE: 71 MMHG | HEART RATE: 74 BPM | OXYGEN SATURATION: 94 % | SYSTOLIC BLOOD PRESSURE: 152 MMHG | RESPIRATION RATE: 18 BRPM

## 2025-07-16 LAB
ANION GAP SERPL CALCULATED.3IONS-SCNC: 8 MMOL/L (ref 4–13)
BACTERIA UR CULT: ABNORMAL
BUN SERPL-MCNC: 41 MG/DL (ref 5–25)
CALCIUM SERPL-MCNC: 9.1 MG/DL (ref 8.4–10.2)
CHLORIDE SERPL-SCNC: 104 MMOL/L (ref 96–108)
CO2 SERPL-SCNC: 25 MMOL/L (ref 21–32)
CREAT SERPL-MCNC: 1.4 MG/DL (ref 0.6–1.3)
ERYTHROCYTE [DISTWIDTH] IN BLOOD BY AUTOMATED COUNT: 15.3 % (ref 11.6–15.1)
GFR SERPL CREATININE-BSD FRML MDRD: 50 ML/MIN/1.73SQ M
GLUCOSE SERPL-MCNC: 198 MG/DL (ref 65–140)
GLUCOSE SERPL-MCNC: 218 MG/DL (ref 65–140)
GLUCOSE SERPL-MCNC: 220 MG/DL (ref 65–140)
GLUCOSE SERPL-MCNC: 238 MG/DL (ref 65–140)
HCT VFR BLD AUTO: 40.9 % (ref 36.5–49.3)
HGB BLD-MCNC: 12.7 G/DL (ref 12–17)
MCH RBC QN AUTO: 29.3 PG (ref 26.8–34.3)
MCHC RBC AUTO-ENTMCNC: 31.1 G/DL (ref 31.4–37.4)
MCV RBC AUTO: 95 FL (ref 82–98)
PLATELET # BLD AUTO: 237 THOUSANDS/UL (ref 149–390)
PMV BLD AUTO: 10.8 FL (ref 8.9–12.7)
POTASSIUM SERPL-SCNC: 4.3 MMOL/L (ref 3.5–5.3)
RBC # BLD AUTO: 4.33 MILLION/UL (ref 3.88–5.62)
SODIUM SERPL-SCNC: 137 MMOL/L (ref 135–147)
WBC # BLD AUTO: 12.3 THOUSAND/UL (ref 4.31–10.16)

## 2025-07-16 PROCEDURE — 85027 COMPLETE CBC AUTOMATED: CPT

## 2025-07-16 PROCEDURE — 80048 BASIC METABOLIC PNL TOTAL CA: CPT

## 2025-07-16 PROCEDURE — 99239 HOSP IP/OBS DSCHRG MGMT >30: CPT | Performed by: INTERNAL MEDICINE

## 2025-07-16 PROCEDURE — 82948 REAGENT STRIP/BLOOD GLUCOSE: CPT

## 2025-07-16 PROCEDURE — 97166 OT EVAL MOD COMPLEX 45 MIN: CPT

## 2025-07-16 PROCEDURE — 97163 PT EVAL HIGH COMPLEX 45 MIN: CPT

## 2025-07-16 RX ORDER — CEFPODOXIME PROXETIL 100 MG/1
100 TABLET, FILM COATED ORAL 2 TIMES DAILY
Qty: 10 TABLET | Refills: 0 | Status: CANCELLED | OUTPATIENT
Start: 2025-07-17 | End: 2025-07-22

## 2025-07-16 RX ADMIN — TAMSULOSIN HYDROCHLORIDE 0.4 MG: 0.4 CAPSULE ORAL at 15:32

## 2025-07-16 RX ADMIN — CEFTRIAXONE 2000 MG: 10 INJECTION, POWDER, FOR SOLUTION INTRAVENOUS at 08:05

## 2025-07-16 RX ADMIN — HEPARIN SODIUM 7500 UNITS: 5000 INJECTION INTRAVENOUS; SUBCUTANEOUS at 05:24

## 2025-07-16 RX ADMIN — HEPARIN SODIUM 7500 UNITS: 5000 INJECTION INTRAVENOUS; SUBCUTANEOUS at 15:32

## 2025-07-16 RX ADMIN — INSULIN LISPRO 5 UNITS: 100 INJECTION, SOLUTION INTRAVENOUS; SUBCUTANEOUS at 16:35

## 2025-07-16 RX ADMIN — INSULIN LISPRO 1 UNITS: 100 INJECTION, SOLUTION INTRAVENOUS; SUBCUTANEOUS at 12:40

## 2025-07-16 RX ADMIN — INSULIN LISPRO 1 UNITS: 100 INJECTION, SOLUTION INTRAVENOUS; SUBCUTANEOUS at 16:35

## 2025-07-16 RX ADMIN — INSULIN LISPRO 1 UNITS: 100 INJECTION, SOLUTION INTRAVENOUS; SUBCUTANEOUS at 07:54

## 2025-07-16 RX ADMIN — INSULIN LISPRO 5 UNITS: 100 INJECTION, SOLUTION INTRAVENOUS; SUBCUTANEOUS at 07:55

## 2025-07-16 RX ADMIN — INSULIN LISPRO 5 UNITS: 100 INJECTION, SOLUTION INTRAVENOUS; SUBCUTANEOUS at 12:41

## 2025-07-16 RX ADMIN — ALLOPURINOL 100 MG: 100 TABLET ORAL at 08:01

## 2025-07-16 NOTE — PLAN OF CARE
Problem: Potential for Falls  Goal: Patient will remain free of falls  Description: INTERVENTIONS:  - Educate patient/family on patient safety including physical limitations  - Instruct patient to call for assistance with activity   - Consider consulting OT/PT to assist with strengthening/mobility based on AM PAC & JH-HLM score  - Consult OT/PT to assist with strengthening/mobility   - Keep Call bell within reach  - Keep bed low and locked with side rails adjusted as appropriate  - Keep care items and personal belongings within reach  - Initiate and maintain comfort rounds  - Make Fall Risk Sign visible to staff  - Offer Toileting every  Hours, in advance of need  - Initiate/Maintain alarm  - Obtain necessary fall risk management equipment:  - Apply yellow socks and bracelet for high fall risk patients  - Consider moving patient to room near nurses station  Outcome: Progressing     Problem: PAIN - ADULT  Goal: Verbalizes/displays adequate comfort level or baseline comfort level  Description: Interventions:  - Encourage patient to monitor pain and request assistance  - Assess pain using appropriate pain scale  - Administer analgesics as ordered based on type and severity of pain and evaluate response  - Implement non-pharmacological measures as appropriate and evaluate response  - Consider cultural and social influences on pain and pain management  - Notify physician/advanced practitioner if interventions unsuccessful or patient reports new pain  - Educate patient/family on pain management process including their role and importance of  reporting pain   - Provide non-pharmacologic/complimentary pain relief interventions  Outcome: Progressing     Problem: INFECTION - ADULT  Goal: Absence or prevention of progression during hospitalization  Description: INTERVENTIONS:  - Assess and monitor for signs and symptoms of infection  - Monitor lab/diagnostic results  - Monitor all insertion sites, i.e. indwelling lines,  tubes, and drains  - Monitor endotracheal if appropriate and nasal secretions for changes in amount and color  - [place] appropriate cooling/warming therapies per order  - Administer medications as ordered  - Instruct and encourage patient and family to use good hand hygiene technique  - Identify and instruct in appropriate isolation precautions for identified infection/condition  Outcome: Progressing  Goal: Absence of fever/infection during neutropenic period  Description: INTERVENTIONS:  - Monitor WBC  - Perform strict hand hygiene  - Limit to healthy visitors only  - No plants, dried, fresh or silk flowers with thao in patient room  Outcome: Progressing     Problem: SAFETY ADULT  Goal: Patient will remain free of falls  Description: INTERVENTIONS:  - Educate patient/family on patient safety including physical limitations  - Instruct patient to call for assistance with activity   - Consider consulting OT/PT to assist with strengthening/mobility based on AM PAC & -HLM score  - Consult OT/PT to assist with strengthening/mobility   - Keep Call bell within reach  - Keep bed low and locked with side rails adjusted as appropriate  - Keep care items and personal belongings within reach  - Initiate and maintain comfort rounds  - Make Fall Risk Sign visible to staff  - Offer Toileting every  Hours, in advance of need  - Initiate/Maintain alarm  - Obtain necessary fall risk management equipment:  - Apply yellow socks and bracelet for high fall risk patients  - Consider moving patient to room near nurses station  Outcome: Progressing  Goal: Maintain or return to baseline ADL function  Description: INTERVENTIONS:  -  Assess patient's ability to carry out ADLs; assess patient's baseline for ADL function and identify physical deficits which impact ability to perform ADLs (bathing, care of mouth/teeth, toileting, grooming, dressing, etc.)  - Assess/evaluate cause of self-care deficits   - Assess range of motion  - Assess  patient's mobility; develop plan if impaired  - Assess patient's need for assistive devices and provide as appropriate  - Encourage maximum independence but intervene and supervise when necessary  - Involve family in performance of ADLs  - Assess for home care needs following discharge   - Consider OT consult to assist with ADL evaluation and planning for discharge  - Provide patient education as appropriate  - Monitor functional capacity and physical performance, use of AM PAC & JH-HLM   - Monitor gait, balance and fatigue with ambulation    Outcome: Progressing  Goal: Maintains/Returns to pre admission functional level  Description: INTERVENTIONS:  - Perform AM-PAC 6 Click Basic Mobility/ Daily Activity assessment daily.  - Set and communicate daily mobility goal to care team and patient/family/caregiver.   - Collaborate with rehabilitation services on mobility goals if consulted  - Perform Range of Motion  times a day.  - Reposition patient every  hours.  - Dangle patient  times a day  - Stand patient  times a day  - Ambulate patient times a day  - Out of bed to chair  times a day   - Out of bed for meals  times a day  - Out of bed for toileting  - Record patient progress and toleration of activity level   Outcome: Progressing     Problem: DISCHARGE PLANNING  Goal: Discharge to home or other facility with appropriate resources  Description: INTERVENTIONS:  - Identify barriers to discharge w/patient and caregiver  - Arrange for needed discharge resources and transportation as appropriate  - Identify discharge learning needs (meds, wound care, etc.)  - Arrange for interpretive services to assist at discharge as needed  - Refer to Case Management Department for coordinating discharge planning if the patient needs post-hospital services based on physician/advanced practitioner order or complex needs related to functional status, cognitive ability, or social support system  Outcome: Progressing     Problem: Knowledge  Deficit  Goal: Patient/family/caregiver demonstrates understanding of disease process, treatment plan, medications, and discharge instructions  Description: Complete learning assessment and assess knowledge base.  Interventions:  - Provide teaching at level of understanding  - Provide teaching via preferred learning methods  Outcome: Progressing

## 2025-07-16 NOTE — ASSESSMENT & PLAN NOTE
BP stable with systolic as high as 144 on 7/16  Patient took home Lisinopril 10 7/14 morning before presentation    Plan:  1.Hold home Lisinopril given LIANA, plan to resume on discharge

## 2025-07-16 NOTE — PLAN OF CARE
Problem: PHYSICAL THERAPY ADULT  Goal: Performs mobility at highest level of function for planned discharge setting.  See evaluation for individualized goals.  Description: Treatment/Interventions: Functional transfer training, LE strengthening/ROM, Elevations, Therapeutic exercise, Equipment eval/education, Patient/family training, Gait training, Bed mobility, Spoke to case management, Spoke to nursing, OT          See flowsheet documentation for full assessment, interventions and recommendations.  Note: Prognosis: Fair  Problem List: Decreased strength, Decreased endurance, Impaired balance, Decreased mobility, Impaired judgement, Decreased safety awareness, Obesity  Assessment: Pt is a 69 y.o. male seen for PT evaluation s/p admit to [facility] on 7/14/2025. Pt was admitted with a primary dx of: acute cystitis without hematuria.  PT now consulted for assessment of mobility and d/c needs. Pt with Activity as tolerated orders.  Pts current comorbidities and personal factors effecting treatment include: BMI, DM, gout, HTN . Pts current clinical presentation is Unstable/Unpredictable (high complexity) due to Ongoing medical management for primary dx, Decreased activity tolerance compared to baseline, Fall risk, Increased assistance needed from caregiver at current time. Prior to admission, pt was independent with use of NBQC. Upon evaluation, pt currently is requiring ; Independent for transfers and Independent for ambulation 100 ft w/ NBQC. Pt presents at PT eval functioning below baseline and currently w/ overall mobility deficits 2* to: BLE weakness, impaired balance, gait deviations, decreased activity tolerance compared to baseline, decreased functional mobility tolerance compared to baseline, decreased safety awareness, impaired judgement, fall risk. Pt currently at a fall risk 2* to impairments listed above.  Pt will continue to benefit from skilled acute PT interventions to address stated  impairments; to maximize functional mobility; for ongoing pt/ family training; and DME needs. At conclusion of PT session chair alarm engaged, all needs in reach, RN notified of session findings/recommendations, and pt returned back in recliner chair with phone and call bell within reach. Pt denies any further questions at this time. Recommend Level III (Minimum Resource Intensity)  upon hospital D/C.        Rehab Resource Intensity Level, PT: III (Minimum Resource Intensity)    See flowsheet documentation for full assessment.

## 2025-07-16 NOTE — OCCUPATIONAL THERAPY NOTE
Occupational Therapy Evaluation     Patient Name: Sreedhar Lagunas  Today's Date: 7/16/2025  Problem List  Principal Problem:    Acute cystitis without hematuria  Active Problems:    LIANA (acute kidney injury) (HCC)    Type 2 diabetes mellitus with stage 3a chronic kidney disease and hypertension (HCC)    Benign essential hypertension    Hyperlipidemia    Wound of left buttock    Past Medical History  Past Medical History[1]  Past Surgical History  Past Surgical History[2]        07/16/25 1019   OT Last Visit   OT Visit Date 07/16/25  (Wednesday)   Note Type   Note type Evaluation  (Eval [id number])   Additional Comments Identified pt by full name and wristband   Pain Assessment   Pain Assessment Tool 0-10   Pain Score No Pain   Restrictions/Precautions   Weight Bearing Precautions Per Order No   Other Precautions   (Scott, room air)   Home Living   Type of Home House   Home Layout Two level;Performs ADLs on one level;Laundry in basement  (4-5 SACHA)   Bathroom Shower/Tub Tub/shower unit   Bathroom Toilet Standard   Bathroom Equipment Tub transfer bench;Grab bars in shower   Bathroom Accessibility Accessible   Home Equipment Cane  (hurrycane)   Additional Comments Pt reports laundry in basement and typically hangs laundry upstairs on 2nd floor. Sleeps in recliner chair on first floor   Prior Function   Level of [place] Independent with ADLs;Independent with functional mobility;Independent with IADLS  (+ drive)   Lives With Alone   Receives Help From Family;Neighbor;Friend(s)   IADLs Independent with driving;Independent with meal prep;Independent with medication management   Falls in the last 6 months 1 to 4   Vocational Retired   Comments Pt reports use of hurrycane and I <> mod I for + time to complete ADLs/ IADLs   Lifestyle   Autonomy Pt reports I <> mod I for + time to complete ADLs / lADLs   Reciprocal Relationships Pt reports living alone. Suportive nephew, family, neighbors   Service to Others Pt is  "retired and worked at [employer]   Intrinsic Gratification Enjoys fishing, hunting   General   Additional Pertinent History Pt admitted to [facility] on 7/14/25. Diagnosed w/ acute cystitis w/ out hematuria.   Family/Caregiver Present No   Subjective   Subjective \"I  have had a few signs recently that I need to stop doing some things\"   ADL   Where Assessed Chair   Eating Assistance 7  Independent   Grooming Assistance 6  Modified Independent   Grooming Deficit Increased time to complete;Standing with assistive device   UB Bathing Assistance Unable to assess  (anticipate (I) based on fxal obs skills, clinical judgement)   LB Bathing Assistance Unable to assess  (anticipate mod I for + time based on fxal obs skills, clinical judgement)   UB Dressing Assistance 7  Independent   LB Dressing Assistance 6  Modified independent  (seated at edge of recliner chair to don pants and manage fasteners / belt in stance)   LB Dressing Deficit Setup;Increased time to complete   Toileting Assistance  6  Modified independent   Toileting Deficit Increased time to complete   Bed Mobility   Supine to Sit Unable to assess   Sit to Supine Unable to assess   Additional Comments Pt seated OOB in chair w/ PTDeuce upon therapist arrival and post eval w/ needs met, call bell in reach   Transfers   Sit to Stand 7  Independent   Stand to Sit 7  Independent   Additional Comments Pt performed sit <> stand 3X independently   Functional Mobility   Functional Mobility 6  Modified independent   Additional Comments > than household distances using cane w/ out LOB   Additional items SPC   Balance   Static Sitting Normal   Dynamic Sitting Fair +   Static Standing Fair +   Ambulatory Fair +   Activity Tolerance   Activity Tolerance Patient tolerated treatment well   Medical Staff Made Aware care coordination w/ PTDeuce due to decreased activity tolerance,anticipated physical assistance required   Nurse Made Aware spoke w/ RN   RUE Assessment   RUE " "Assessment WFL   RUE Strength   RUE Overall Strength Within Functional Limits - able to perform ADL tasks with strength   LUE Assessment   LUE Assessment WFL   LUE Strength   LUE Overall Strength Within Functional Limits - able to perform ADL tasks with strength   Hand Function   Gross Motor Coordination Functional   Fine Motor Coordination Functional   Vision-Basic Assessment   Current Vision Wears glasses only for reading  (\"cheaters\")   Cognition   Overall Cognitive Status WFL   Arousal/Participation Alert;Cooperative   Attention Within functional limits   Orientation Level Oriented X4   Memory Within functional limits   Following Commands Follows all commands and directions without difficulty   Comments Alert, oriented and agreeable to participate. Able to follow directions, communicate wants / needs and participate in conversation   Assessment   Assessment Pt is a 68yo male admitted to [facility] on 7/14/25 from home not feeling well. Diagnosed w/ acute cystitis w/ out hematuria. Of note pt fell on Wed and was on the ground 40 hours before his neighbors were able to get him up. Significant PMH impacting his occupational performance includes obesity, hx Gout, DM II, obesity, buttocks wound (07/14/25). Pt reports living alone in a 2 SH but able to maintain first floor set- up. Pt reports I <> mod I for + time to complete ADL/ IADL using hurrycane. Personal and environmental factors supporting performance includes independent at baseline, access to AD/ DME; barriers include lives alone, fall history. Upon eval, pt alert and oriented. Able to follow directions and participate in conversation. Pt is completing ADLs at/ near functional baseline using hurrycane. Pt required mod I LBD, (I) sit <> stand, mod I using cane for functional mobility. Recommend active participation in ADLs w/ RN staff. No post acute OT rehab needs. Will DC from OT caseload. Please re consult if acute needs arise/ change in functional status. DC OT "   Goals   Patient Goals Pt stated that he wants to return home to baseline level of I and be able to go fishing, take care of home   Plan   OT Frequency Eval only   Discharge Recommendation   Rehab Resource Intensity Level, OT No post-acute rehabilitation needs   Equipment Recommended   (pt reports having DME)   AM-PAC Daily Activity Inpatient   Lower Body Dressing 4   Bathing 3   Toileting 4   Upper Body Dressing 4   Grooming 4   Eating 4   Daily Activity Raw Score 23   Daily Activity Standardized Score (Calc for Raw Score >=11) 51.12   AM-PAC Applied Cognition Inpatient   Following a Speech/Presentation 4   Understanding Ordinary Conversation 4   Taking Medications 4   Remembering Where Things Are Placed or Put Away 4   Remembering List of 4-5 Errands 4   Taking Care of Complicated Tasks 4   Applied Cognition Raw Score 24   Applied Cognition Standardized Score 62.21   Barthel Index   Feeding 10   Bathing 0   Grooming Score 5   Dressing Score 10   Bladder Score 10   Bowels Score 10   Toilet Use Score 10   Transfers (Bed/Chair) Score 15   Mobility (Level Surface) Score 10   Stairs Score 5   Barthel Index Score 85   End of Consult   Education Provided Yes   Patient Position at End of Consult Bedside chair;All needs within reach   Nurse Communication Nurse aware of consult   Licensure   [place] License Number  Oxana Carrasco, OTR/L          The patient's raw score on the AM-PAC Daily Activity Inpatient Short Form is 23. A raw score of greater than or equal to 19 suggests the patient may benefit from discharge to home. Please refer to the recommendation of the Occupational Therapist for safe discharge planning.    Oxana Carrasco OTR/L  [id number]  [id number]           [1]   Past Medical History:  Diagnosis Date    Diabetes mellitus (HCC)     Gout     Hypertension     Leukocytosis     Onset:4/26/2012    Onychomycosis     Onse:4/26/2012    Other muscle spasm     Renal calculi     Shoulder joint pain    [2]   Past  Surgical History:  Procedure Laterality Date    CARPAL TUNNEL RELEASE Bilateral     Last Assessed:9/26/2014    CYSTOSCOPY      EYE SURGERY  09/04/2019    Detached retina    HERNIA REPAIR      Femoral Hernia Repair Unilateral    WI COLONOSCOPY FLX DX W/COLLJ SPEC WHEN PFRMD N/A 12/13/2018    Procedure: COLONOSCOPY;  Surgeon: Amy Verdugo MD;  Location: [facility];  Service: Gastroenterology    WI CYSTO/URETERO W/LITHOTRIPSY &INDWELL STENT INSRT Left 8/11/2016    Procedure: CYSTOSCOPY URETEROSCOPY,RETROGRADE PYELOGRAM AND INSERTION STENT URETERAL;  Surgeon: Sigifredo Lozada MD;  Location: [facility];  Service: Urology    WI LITHOTRIPSY XTRCORP SHOCK WAVE Right 11/17/2016    Procedure: ESWL;  Surgeon: Sigifredo Lozada MD;  Location: [facility];  Service: Urology

## 2025-07-16 NOTE — ASSESSMENT & PLAN NOTE
Slight elevation in AST and ALT.  7/15 CMP with resolution of transaminitis    Plan:  1.Hold home Simvastatin, resume on discharge  2.Daily CMP while admitted here with us

## 2025-07-16 NOTE — ASSESSMENT & PLAN NOTE
Dysuria, urgency, frequency since Wednesday. Not associated with fever,chills, N/V. No hx of UTI  UA:+large leukocytes, + nitrites, trace protein  Urine microscopic: Innumerable WBC, + WBC clumps  Received 1x dose of IV Ceftriaxone 2000 in ED. Afebrile. WBC: 12.64  No suprapubic tenderness on PE    Plans:  IV Ceftriaxone 2000 Q24H  Continue following up on urine culture for susceptibility results  Continue home Tamsulosin 0.4   Daily CMP

## 2025-07-16 NOTE — ASSESSMENT & PLAN NOTE
Dysuria, urgency, frequency since Wednesday. Not associated with fever,chills, N/V. No hx of UTI  UA:+large leukocytes, + nitrites, trace protein  Urine microscopic: Innumerable WBC, + WBC clumps  Received 1x dose of IV Ceftriaxone 2000 in ED. Afebrile. WBC: 12.64  No suprapubic tenderness on PE    Plans:  Transition to oral cephalexin 500 mg twice daily for total course of 7 days, 5 days postdischarge ending on 7/22  Continue home Tamsulosin 0.4   Monitored with daily CMP while inpatient, noting stable leukocytosis

## 2025-07-16 NOTE — ASSESSMENT & PLAN NOTE
Lab Results   Component Value Date    HGBA1C 6.3 (H) 05/28/2025       Recent Labs     07/15/25  0715 07/15/25  1040 07/15/25  1612 07/15/25  2110   POCGLU 231* 247* 208* 220*       Blood Sugar Average: Last 72 hrs:  (P) [id number]  Patient noted that while being down Wednesday-Friday, he did not take his home meds. Resumed on Friday and noted BS's to be in 200's. On admission, Glucose:232 and currently asymptomatic.  Patient on Metformin 850 and Actos 30 daily at home.  A1C: 6.3    Plan:  Hold home meds  Increase glargine to 25 units daily  Lispro 5 units 3 times daily  Sliding scale lispro 1-5 with meals

## 2025-07-16 NOTE — PHYSICAL THERAPY NOTE
"   Physical Therapy Evaluation    Patient's Name: Sreedhar Lagunas    Admitting Diagnosis  Cellulitis of buttock [L03.317]  Urinary tract infection [N39.0]  Elevated serum creatinine [R79.89]  Urinary problem [R39.89]    Problem List  Problem List[1]    Past Medical History  Past Medical History[2]    Past Surgical History  Past Surgical History[3]       25 0954   PT Last Visit   PT Visit Date 25   Note Type   Note type Evaluation   Pain Assessment   Pain Assessment Tool 0-10   Pain Score No Pain   Restrictions/Precautions   Weight Bearing Precautions Per Order No   Other Precautions Fall Risk;Chair Alarm;Bed Alarm   Home Living   Type of Home House   Home Layout Stairs to enter with rails;Two level;Performs ADLs on one level;Able to live on main level with bedroom/bathroom  (4-5 SACHA)   Bathroom Shower/Tub Tub/shower unit   Bathroom Toilet Standard   Bathroom Equipment Grab bars in shower;Tub transfer bench   Home Equipment   (Casetextcane, \"an assortment of canes\")   Prior Function   Lives With Alone   Receives Help From Neighbor;Family   IADLs Independent with driving;Independent with medication management;Independent with meal prep   Falls in the last 6 months 1 to 4   Vocational Retired   General   Family/Caregiver Present No   Cognition   Orientation Level Oriented X4   Following Commands Follows all commands and directions without difficulty   Comments pt ID by wristband, name and    Subjective   Subjective pt received OOB on toilet   RLE Assessment   RLE Assessment X  (grossly assessed to at least 4-/5)   LLE Assessment   LLE Assessment X  (grossly assessed to at least 4-/5)   Bed Mobility   Additional Comments pt OOB on toilet, pre, OOB in recliner post   Transfers   Sit to Stand 7  Independent   Stand to Sit 7  Independent   Toilet transfer 7  Independent   Additional Comments x4 STS from various surfaces including, toilet, recliner and EOB   Ambulation/Elevation   Gait pattern Decreased foot " clearance;Step through pattern;Short stride   Gait Assistance 6  Modified independent   Additional items Assist x 1;Verbal cues   Assistive Device   (persoanly hurrycane)   Distance 15'x1, 100'x2   Stair Management Assistance 6  Modified independent   Additional items Assist x 1;Verbal cues   Stair Management Technique One rail R;With cane   Number of Stairs 5   Ambulation/Elevation Additional Comments no LOB or path deviations noted, safe negotiations of environment and obstacles   Balance   Static Sitting Normal   Dynamic Sitting Fair +   Static Standing Fair +   Dynamic Standing Fair   Ambulatory Fair   Activity Tolerance   Activity Tolerance Patient tolerated treatment well   Medical Staff Made Aware care coordinated with OT for part of evaluation spoke with CM   Nurse Made Aware RN pre/post   Assessment   Prognosis Fair   Problem List Decreased strength;Decreased endurance;Impaired balance;Decreased mobility;Impaired judgement;Decreased safety awareness;Obesity   Assessment Pt is a 69 y.o. male seen for PT evaluation s/p admit to [facility] on 7/14/2025. Pt was admitted with a primary dx of: acute cystitis without hematuria.  PT now consulted for assessment of mobility and d/c needs. Pt with Activity as tolerated orders.  Pts current comorbidities and personal factors effecting treatment include: BMI, DM, gout, HTN . Pts current clinical presentation is Unstable/Unpredictable (high complexity) due to Ongoing medical management for primary dx, Decreased activity tolerance compared to baseline, Fall risk, Increased assistance needed from caregiver at current time. Prior to admission, pt was independent with use of NBQC. Upon evaluation, pt currently is requiring ; Independent for transfers and Independent for ambulation 100 ft w/ NBQC. Pt presents at PT eval functioning below baseline and currently w/ overall mobility deficits 2* to: BLE weakness, impaired balance, gait deviations, decreased activity  tolerance compared to baseline, decreased functional mobility tolerance compared to baseline, decreased safety awareness, impaired judgement, fall risk. Pt currently at a fall risk 2* to impairments listed above.  Pt will continue to benefit from skilled acute PT interventions to address stated impairments; to maximize functional mobility; for ongoing pt/ family training; and DME needs. At conclusion of PT session chair alarm engaged, all needs in reach, RN notified of session findings/recommendations, and pt returned back in recliner chair with phone and call bell within reach. Pt denies any further questions at this time. Recommend Level III (Minimum Resource Intensity)  upon hospital D/C.   Goals   Patient Goals to return to [facility]   STG Expiration Date 07/26/25   Short Term Goal #1 In 10 days pt will be able to: 1. Demonstrate ability to perform all aspects of bed mobility independently to improve functional safety.  2. Perform functional transfers independently to facilitate safe return to previous living environment.  3.  Ambulate at least 350 ft with LRAD independently with stable vitals to improve safety with household distances and reduce fall risk.  4. Improve LE strength grades by 1 to increase ease of functional mobility with transfers and gait. 5. Pt will demonstrate improved balance by one grade in order to decrease risk of falls. 6. Climb at least 12 steps with unilateral HR with LRAD independently to simulate entrance to home.   PT Treatment Day 0   Plan   Treatment/Interventions Functional transfer training;LE strengthening/ROM;Elevations;Therapeutic exercise;Equipment eval/education;Patient/family training;Gait training;Bed mobility;Spoke to case management;Spoke to nursing;OT   PT Frequency 1-2x/wk   Discharge Recommendation   Rehab Resource Intensity Level, PT III (Minimum Resource Intensity)   AM-PAC Basic Mobility Inpatient   Turning in Flat Bed Without Bedrails 3   Lying on Back to Sitting on  Edge of Flat Bed Without Bedrails 3   Moving Bed to Chair 3   Standing Up From Chair Using Arms 3   Walk in Room 3   Climb 3-5 Stairs With Railing 3   Basic Mobility Inpatient Raw Score 18   Basic Mobility Standardized Score 41.05   [facility] Highest Level Of Mobility   -HLM Goal 6: Walk 10 steps or more   JH-HLM Achieved 7: Walk 25 feet or more   End of Consult   Patient Position at End of Consult Bedside chair;Bed/Chair alarm activated;All needs within reach   The patient's AM-PAC Basic Mobility Inpatient Short Form Raw Score is 18. A Raw score of greater than 16 suggests the patient may benefit from discharge to home. Please also refer to the recommendation of the Physical Therapist for safe discharge planning.    Johny Smith, PT             [1]   Patient Active Problem List  Diagnosis    LIANA (acute kidney injury) ([facility])    Type 2 diabetes mellitus with stage 3a chronic kidney disease and hypertension ([facility])    Gout    Morbid obesity with BMI of 40.0-44.9, adult ([facility])    Nephrolithiasis    Benign essential hypertension    Callus    Hyperlipidemia    Onychomycosis of toenail    Plantar callus    Tinea pedis of left foot    Encounter for immunization    Needs flu shot    CKD (chronic kidney disease) stage 3, GFR 30-59 ml/min ([facility])    Leg edema    Acute cystitis without hematuria    Wound of left buttock   [2]   Past Medical History:  Diagnosis Date    Diabetes mellitus ([facility])     Gout     Hypertension     Leukocytosis     Onset:4/26/2012    Onychomycosis     Onse:4/26/2012    Other muscle spasm     Renal calculi     Shoulder joint pain    [3]   Past Surgical History:  Procedure Laterality Date    CARPAL TUNNEL RELEASE Bilateral     Last Assessed:9/26/2014    CYSTOSCOPY      EYE SURGERY  09/04/2019    Detached retina    HERNIA REPAIR      Femoral Hernia Repair Unilateral    OK COLONOSCOPY FLX DX W/COLLJ SPEC WHEN PFRMD N/A 12/13/2018    Procedure: COLONOSCOPY;  Surgeon: Amy Verdugo MD;  Location: [facility]   GI LAB;  Service: Gastroenterology    VA CYSTO/URETERO W/LITHOTRIPSY &INDWELL STENT INSRT Left 8/11/2016    Procedure: CYSTOSCOPY URETEROSCOPY,RETROGRADE PYELOGRAM AND INSERTION STENT URETERAL;  Surgeon: Sigifredo Lozada MD;  Location: [facility] Main OR;  Service: Urology    VA LITHOTRIPSY XTRCORP SHOCK WAVE Right 11/17/2016    Procedure: ESWL;  Surgeon: Sigifredo Lozada MD;  Location: [facility];  Service: Urology

## 2025-07-16 NOTE — DISCHARGE INSTR - AVS FIRST PAGE
Dear Sreedhar Lagunas,     It was our pleasure to care for you here at [facility].  It is our hope that we were always able to exceed the expected standards for your care during your stay.  You were hospitalized due to mechanical fall with extended period down, UTI.  You were cared for on the fourth floor by Db Daley DO under the service of Ady Arroyo DO with the [facility] who covers for your primary care physician (PCP), Kamryn Chavez MD, while you were hospitalized.  If you have any questions or concerns related to this hospitalization, you may contact us at .  For follow up as well as any medication refills, we recommend that you follow up with your primary care physician.  A registered nurse will reach out to you by phone within a few days after your discharge to answer any additional questions that you may have after going home.  However, at this time we provide for you here, the most important instructions / recommendations at discharge:     Notable Medication Adjustments -   You should start taking cephalexin 500 mg tablet by mouth 2 times daily for a total of 5 days, starting on July 17 and ending on July 21.  Continue taking all other previously prescribed medications as indicated by the prescribing physician.  Testing Required after Discharge -   CBC, complete blood count and BMP, basic metabolic panel  ** Please contact your PCP to request testing orders for any of the testing recommended here **  Important follow up information -   Please schedule an appointment to follow-up with your primary care physician within 1 week of discharge.  Other Instructions -   Should the symptoms that brought you to us fail to improve or continue to worsen, contact your primary care provider, if you are unable to contact your primary care provider you should promptly return to the hospital.  Should you develop any new or worsening fever,  lightheadedness, dizziness, chest pain, palpitations, shortness of breath, intense nausea/vomiting/diarrhea, new onset weakness, or significant tiredness, you should contact your primary care provider immediately, if you are unable to contact your primary care provider you should promptly return to the hospital.  Please review this entire after visit summary as additional general instructions including medication list, appointments, activity, diet, any pertinent wound care, and other additional recommendations from your care team that may be provided for you.      Sincerely,     Db Daley, DO

## 2025-07-16 NOTE — ASSESSMENT & PLAN NOTE
Lab Results   Component Value Date    HGBA1C 6.3 (H) 05/28/2025       Recent Labs     07/15/25  1612 07/15/25  2110 07/16/25  0710 07/16/25  1040   POCGLU 208* 220* 220* 218*       Blood Sugar Average: Last 72 hrs:  (P) [id number]  Patient noted that while being down Wednesday-Friday, he did not take his home meds. Resumed on Friday and noted BS's to be in 200's. On admission, Glucose:232 and currently asymptomatic.  Patient on Metformin 850 and Actos 30 daily at home, will resume on discharge  A1C: 6.3    Plan:  Held home meds  Maintain with the following for insulin therapy while inpatient  Glargine 15 units daily  Lispro 5 units 3 times daily  Correction factor I-V units lispro with meals  Resume home meds on discharge

## 2025-07-16 NOTE — ASSESSMENT & PLAN NOTE
On PE, 3 well circumcribsed erosion with deep black pigmentation noted on lateral L.buttock. Ill-defined erythema and minimal warmth. No drainage,induration or odor noted.  Active infection vs chronic healing. Cellulitis unlikely given slight elevation in WBC, afebrile and absence of pain.     Plan:  IV Ceftriaxone 2000 mg Q24H, can consider Pseudomonal coverage but low suspicion at this time  Continue to monitor for signs of cellulitis including fever, elevated WBC, spreading erythema and warmth  Wound care consulted, saw patient in the morning of 7/15 with the following:  Bilateral Sacro-Buttocks: apply calazime cream to bilateral sacro-buttocks three times a day or as needed with incontinence.   Turn/reposition every 2 hours or when medically stable for pressure re-distribution on skin.  Elevate heels to offload pressure.  Moisturize skin daily with skin nourishing cream  Ehob cushion in chair when out of bed.  Preventative Hydraguard to bilateral heels twice a day and as needed.

## 2025-07-16 NOTE — ASSESSMENT & PLAN NOTE
Slight elevation in AST and ALT on admission  7/15 CMP with resolution of transaminitis    Plan:  1.resume simvastatin on discharge  2.Daily CMP

## 2025-07-16 NOTE — PLAN OF CARE
Problem: Potential for Falls  Goal: Patient will remain free of falls  Description: INTERVENTIONS:  - Educate patient/family on patient safety including physical limitations  - Instruct patient to call for assistance with activity   - Consider consulting OT/PT to assist with strengthening/mobility based on AM PAC & JH-HLM score  - Consult OT/PT to assist with strengthening/mobility   - Keep Call bell within reach  - Keep bed low and locked with side rails adjusted as appropriate  - Keep care items and personal belongings within reach  - Initiate and maintain comfort rounds  - Make Fall Risk Sign visible to staff  - Offer Toileting every  Hours, in advance of need  - Initiate/Maintain alarm  - Obtain necessary fall risk management equipment:  - Apply yellow socks and bracelet for high fall risk patients  - Consider moving patient to room near nurses station  7/16/2025 1747 by Breann Guadarrama RN  Outcome: Adequate for Discharge  7/16/2025 1530 by Breann Guadarrama RN  Outcome: Progressing     Problem: PAIN - ADULT  Goal: Verbalizes/displays adequate comfort level or baseline comfort level  Description: Interventions:  - Encourage patient to monitor pain and request assistance  - Assess pain using appropriate pain scale  - Administer analgesics as ordered based on type and severity of pain and evaluate response  - Implement non-pharmacological measures as appropriate and evaluate response  - Consider cultural and social influences on pain and pain management  - Notify physician/advanced practitioner if interventions unsuccessful or patient reports new pain  - Educate patient/family on pain management process including their role and importance of  reporting pain   - Provide non-pharmacologic/complimentary pain relief interventions  7/16/2025 1747 by Breann Guadarrama RN  Outcome: Adequate for Discharge  7/16/2025 1530 by Breann Guadarrama RN  Outcome: Progressing     Problem: INFECTION - ADULT  Goal: Absence or  prevention of progression during hospitalization  Description: INTERVENTIONS:  - Assess and monitor for signs and symptoms of infection  - Monitor lab/diagnostic results  - Monitor all insertion sites, i.e. indwelling lines, tubes, and drains  - Monitor endotracheal if appropriate and nasal secretions for changes in amount and color  - [place] appropriate cooling/warming therapies per order  - Administer medications as ordered  - Instruct and encourage patient and family to use good hand hygiene technique  - Identify and instruct in appropriate isolation precautions for identified infection/condition  7/16/2025 1747 by Breann Guadarrama RN  Outcome: Adequate for Discharge  7/16/2025 1530 by Breann Guadarrama RN  Outcome: Progressing  Goal: Absence of fever/infection during neutropenic period  Description: INTERVENTIONS:  - Monitor WBC  - Perform strict hand hygiene  - Limit to healthy visitors only  - No plants, dried, fresh or silk flowers with thao in patient room  7/16/2025 1747 by Breann Guadarrama RN  Outcome: Adequate for Discharge  7/16/2025 1530 by Breann Guadarrama RN  Outcome: Progressing     Problem: SAFETY ADULT  Goal: Patient will remain free of falls  Description: INTERVENTIONS:  - Educate patient/family on patient safety including physical limitations  - Instruct patient to call for assistance with activity   - Consider consulting OT/PT to assist with strengthening/mobility based on AM PAC & JH-HLM score  - Consult OT/PT to assist with strengthening/mobility   - Keep Call bell within reach  - Keep bed low and locked with side rails adjusted as appropriate  - Keep care items and personal belongings within reach  - Initiate and maintain comfort rounds  - Make Fall Risk Sign visible to staff  - Offer Toileting every  Hours, in advance of need  - Initiate/Maintain alarm  - Obtain necessary fall risk management equipment:  - Apply yellow socks and bracelet for high fall risk patients  - Consider moving  patient to room near nurses station  7/16/2025 1747 by Breann Guadarrama RN  Outcome: Adequate for Discharge  7/16/2025 1530 by Breann Guadarrama RN  Outcome: Progressing  Goal: Maintain or return to baseline ADL function  Description: INTERVENTIONS:  -  Assess patient's ability to carry out ADLs; assess patient's baseline for ADL function and identify physical deficits which impact ability to perform ADLs (bathing, care of mouth/teeth, toileting, grooming, dressing, etc.)  - Assess/evaluate cause of self-care deficits   - Assess range of motion  - Assess patient's mobility; develop plan if impaired  - Assess patient's need for assistive devices and provide as appropriate  - Encourage maximum independence but intervene and supervise when necessary  - Involve family in performance of ADLs  - Assess for home care needs following discharge   - Consider OT consult to assist with ADL evaluation and planning for discharge  - Provide patient education as appropriate  - Monitor functional capacity and physical performance, use of AM PAC & JH-HLM   - Monitor gait, balance and fatigue with ambulation    7/16/2025 1747 by Breann Guadarrama RN  Outcome: Adequate for Discharge  7/16/2025 1530 by Breann Guadarrama RN  Outcome: Progressing  Goal: Maintains/Returns to pre admission functional level  Description: INTERVENTIONS:  - Perform AM-PAC 6 Click Basic Mobility/ Daily Activity assessment daily.  - Set and communicate daily mobility goal to care team and patient/family/caregiver.   - Collaborate with rehabilitation services on mobility goals if consulted  - Perform Range of Motion  times a day.  - Reposition patient every  hours.  - Dangle patient  times a day  - Stand patient  times a day  - Ambulate patient times a day  - Out of bed to chair  times a day   - Out of bed for meals  times a day  - Out of bed for toileting  - Record patient progress and toleration of activity level   7/16/2025 1747 by Breann Guadarrama RN  Outcome:  Adequate for Discharge  7/16/2025 1530 by Breann Guadarrama RN  Outcome: Progressing     Problem: DISCHARGE PLANNING  Goal: Discharge to home or other facility with appropriate resources  Description: INTERVENTIONS:  - Identify barriers to discharge w/patient and caregiver  - Arrange for needed discharge resources and transportation as appropriate  - Identify discharge learning needs (meds, wound care, etc.)  - Arrange for interpretive services to assist at discharge as needed  - Refer to Case Management Department for coordinating discharge planning if the patient needs post-hospital services based on physician/advanced practitioner order or complex needs related to functional status, cognitive ability, or social support system  7/16/2025 1747 by Breann Guadarrama RN  Outcome: Adequate for Discharge  7/16/2025 1530 by Breann Guadarrama RN  Outcome: Progressing     Problem: Knowledge Deficit  Goal: Patient/family/caregiver demonstrates understanding of disease process, treatment plan, medications, and discharge instructions  Description: Complete learning assessment and assess knowledge base.  Interventions:  - Provide teaching at level of understanding  - Provide teaching via preferred learning methods  7/16/2025 1747 by Breann Guadarrama RN  Outcome: Adequate for Discharge  7/16/2025 1530 by Breann Guadarrama RN  Outcome: Progressing

## 2025-07-16 NOTE — ASSESSMENT & PLAN NOTE
On PE, 3 well circumcribsed erosion with deep black pigmentation noted on lateral L.buttock. Ill-defined erythema and minimal warmth. No drainage,induration or odor noted.  Active infection vs chronic healing. Cellulitis unlikely given slight elevation in WBC, afebrile and absence of pain.     Plan:  IV Ceftriaxone 2000 mg Q24H while inpatient, no signs or symptoms of infection from wound.  Continued to monitor for signs of cellulitis including fever, elevated WBC, spreading erythema and warmth while admitted with us here,  Wound care consulted, saw patient in the morning of 7/15 with the following recommendations for the patient while he was here with us:  Bilateral Sacro-Buttocks: apply calazime cream to bilateral sacro-buttocks three times a day or as needed with incontinence.   Turn/reposition every 2 hours or when medically stable for pressure re-distribution on skin.  Elevate heels to offload pressure.  Moisturize skin daily with skin nourishing cream  Ehob cushion in chair when out of bed.  Preventative Hydraguard to bilateral heels twice a day and as needed.

## 2025-07-16 NOTE — HOSPITAL COURSE
"Sreedhar presented to the emergency department on Monday 7/14 with complaint of continued \"feeling off\" following being on the ground secondary to a fall for approximately 48 hours, from Wednesday afternoon to Friday night (7/9 - 7/11).  Initially patient tried to maintain oral hydration at home though presented to the emergency department at the recommendation of his family following continued feeling unwell.  Prior to his fall patient was also noting some urinary urgency and difficulty voiding as well as progressive weakness and malaise.  In the ED lab work was significant for mild hyponatremia at 133, LIANA as indicated by elevated creatinine to 1.71 with a baseline of approximately 1.1-1.3, elevated glucose of 232, transaminitis with AST/ALT at 55/50 respectively.  Trauma workup was negative.  Additionally on admission he was noted with CK at 491, leukocytosis at 12.46.  Viral panel was negative.  UA noting small amount of blood, large amount of leukocytes, nitrite positive, trace protein, urine cultures drawn.  Urine cultures eventually resulted with greater than 100,000 colony-forming units of E. coli, sensitivity resulted on 7/16 noting pan sensitivity.    In terms of his UTI, the patient was maintained on ceftriaxone 2 g daily, and noted daily improvement in his urinary symptoms including frequency, urgency, and retention.  He remained afebrile throughout his stay and without systemic signs or symptoms.  He remained without back pain or flank tenderness.  His LIANA was trended with daily BMPs noting improvement in creatinine, day of discharge 7/16 he was noted with creatinine of 1.4 from a baseline of 1.1-1.3.  His transaminitis resolved as of the morning of 7/15.  His elevated CK additionally resolved by the morning of 7/15.  He remained with a mild leukocytosis, day of discharge 7/16 with stable 12.30 unchanged from day prior 7/15.  "

## 2025-07-16 NOTE — ASSESSMENT & PLAN NOTE
Patient was down from Wednesday night to Friday afternoon without food or water  In ED, Cr: 1.71 (baseline 1.1-1.3), CK:491  Likely pre-renal 2/2 decreased PO intake. Could consider ATN given elevated CK but less likely  Labs morning 7/15 showing resolution of CK now 171, creatinine improved to 1.51  Labs morning 7/15 showing continued improvement in creatinine to 1.4    Plan:  Patient maintained conservatively with p.o. fluid intake  Hold home Metformin and Lisinopril, resume on discharge

## 2025-07-16 NOTE — DISCHARGE SUMMARY
Discharge Summary - Hospitalist   Name: Sreedhar Lagunas 69 y.o. male I MRN: [id number]  Unit/Bed#: W -01 I Date of Admission: 7/14/2025   Date of Service: 7/16/2025 I Hospital Day: 2     Assessment & Plan  Acute cystitis without hematuria  Dysuria, urgency, frequency since Wednesday. Not associated with fever,chills, N/V. No hx of UTI  UA:+large leukocytes, + nitrites, trace protein  Urine microscopic: Innumerable WBC, + WBC clumps  Received 1x dose of IV Ceftriaxone 2000 in ED. Afebrile. WBC: 12.64  No suprapubic tenderness on PE    Plans:  Transition to oral cephalexin 500 mg twice daily for total course of 7 days, 5 days postdischarge ending on 7/22  Continue home Tamsulosin 0.4   Monitored with daily CMP while inpatient, noting stable leukocytosis  LIANA (acute kidney injury) (HCC)  Patient was down from Wednesday night to Friday afternoon without food or water  In ED, Cr: 1.71 (baseline 1.1-1.3), CK:491  Likely pre-renal 2/2 decreased PO intake. Could consider ATN given elevated CK but less likely  Labs morning 7/15 showing resolution of CK now 171, creatinine improved to 1.51  Labs morning 7/15 showing continued improvement in creatinine to 1.4    Plan:  Patient maintained conservatively with p.o. fluid intake  Hold home Metformin and Lisinopril, resume on discharge  Wound of left buttock  On PE, 3 well circumcribsed erosion with deep black pigmentation noted on lateral L.buttock. Ill-defined erythema and minimal warmth. No drainage,induration or odor noted.  Active infection vs chronic healing. Cellulitis unlikely given slight elevation in WBC, afebrile and absence of pain.     Plan:  IV Ceftriaxone 2000 mg Q24H while inpatient, no signs or symptoms of infection from wound.  Continued to monitor for signs of cellulitis including fever, elevated WBC, spreading erythema and warmth while admitted with us here,  Wound care consulted, saw patient in the morning of 7/15 with the following recommendations for  the patient while he was here with us:  Bilateral Sacro-Buttocks: apply calazime cream to bilateral sacro-buttocks three times a day or as needed with incontinence.   Turn/reposition every 2 hours or when medically stable for pressure re-distribution on skin.  Elevate heels to offload pressure.  Moisturize skin daily with skin nourishing cream  Ehob cushion in chair when out of bed.  Preventative Hydraguard to bilateral heels twice a day and as needed.   Type 2 diabetes mellitus with stage 3a chronic kidney disease and hypertension (HCC)  Lab Results   Component Value Date    HGBA1C 6.3 (H) 05/28/2025       Recent Labs     07/15/25  1612 07/15/25  2110 07/16/25  0710 07/16/25  1040   POCGLU 208* 220* 220* 218*       Blood Sugar Average: Last 72 hrs:  (P) [id number]  Patient noted that while being down Wednesday-Friday, he did not take his home meds. Resumed on Friday and noted BS's to be in 200's. On admission, Glucose:232 and currently asymptomatic.  Patient on Metformin 850 and Actos 30 daily at home, will resume on discharge  A1C: 6.3    Plan:  Held home meds  Maintain with the following for insulin therapy while inpatient  Glargine 15 units daily  Lispro 5 units 3 times daily  Correction factor I-V units lispro with meals  Resume home meds on discharge  Benign essential hypertension  BP stable 130's/60's as of 7/15  Patient took home Lisinopril 10 7/14 morning before presentation      Plan:  1.Hold home Lisinopril given LIANA, resume on discharge  Hyperlipidemia  Slight elevation in AST and ALT.  7/15 CMP with resolution of transaminitis    Plan:  1.Hold home Simvastatin, resume on discharge  2.Daily CMP while admitted here with us     Medical Problems       Resolved Problems  Date Reviewed: 6/4/2025   None       Discharging Physician / Practitioner: Db Daley DO  PCP: Kamryn Chavez MD  Admission Date:   Admission Orders (From admission, onward)       Ordered        07/14/25 1016  INPATIENT ADMISSION   "Once                          Discharge Date: 07/16/25    Next Steps for Physician/AP Assuming Care:  Patient was admitted to us with an LIANA likely secondary to being without p.o. intake for approximately 40 hours prior to admission.  Also noted with elevated CK, on admission though resolved by time of discharge.    Test Results Pending at Discharge (will require follow up):  None    Medication Changes for Discharge & Rationale:   Patient was started on oral cephalexin at time of discharge for coverage of E. coli bacteriuria discovered on urine culture on admission.  See after visit summary for reconciled discharge medications provided to patient and/or family.     Consultations During Hospital Stay:  Wound care    Procedures Performed:   None    Significant Findings / Test Results:   Mild to moderate bilateral hip osteoarthritis noted on x-ray hip on day of admission 7/14, otherwise no acute findings throughout this admission.    Incidental Findings:   None    Hospital Course:   Sreedhar Lagunas is a 69 y.o. male patient who originally presented to the hospital on 7/14/2025 due to fall with approximately 40 hours spent down due to progressive weakness and malaise, found to be secondary to UTI.    Sreedhar presented to the emergency department on Monday 7/14 with complaint of continued \"feeling off\" following being on the ground secondary to a fall for approximately 48 hours, from Wednesday afternoon to Friday night (7/9 - 7/11).  Initially patient tried to maintain oral hydration at home though presented to the emergency department at the recommendation of his family following continued feeling unwell.  Prior to his fall patient was also noting some urinary urgency and difficulty voiding as well as progressive weakness and malaise.  In the ED lab work was significant for mild hyponatremia at 133, LIANA as indicated by elevated creatinine to 1.71 with a baseline of approximately 1.1-1.3, elevated glucose of 232, " transaminitis with AST/ALT at 55/50 respectively.  Trauma workup was negative.  Additionally on admission he was noted with CK at 491, leukocytosis at 12.46.  Viral panel was negative.  UA noting small amount of blood, large amount of leukocytes, nitrite positive, trace protein, urine cultures drawn.  Urine cultures eventually resulted with greater than 100,000 colony-forming units of E. coli, sensitivity resulted on 7/16 noting pan sensitivity.    In terms of his UTI, the patient was maintained on ceftriaxone 2 g daily, and noted daily improvement in his urinary symptoms including frequency, urgency, and retention.  He remained afebrile throughout his stay and without systemic signs or symptoms.  He remained without back pain or flank tenderness.  His LIANA was trended with daily BMPs noting improvement in creatinine, day of discharge 7/16 he was noted with creatinine of 1.4 from a baseline of 1.1-1.3.  His transaminitis resolved as of the morning of 7/15.  His elevated CK additionally resolved by the morning of 7/15.  He remained with a mild leukocytosis, day of discharge 7/16 with stable 12.30 unchanged from day prior 7/15.      Please see above list of diagnoses and related plan for additional information.     Discharge Day Visit / Exam:   Subjective:  Today, day of discharge 7/16/2025, patient seen and examined at the bedside, denying any new complaints including new onset of fever, lightheadedness, shortness of breath, chest pain, diaphoresis, nausea/vomiting/diarrhea.  States he has a good appetite and slept reasonably well overnight.  Noting continued improvement in his urinary symptoms, still with some urinary frequency.  I reminded him again today that he must maintain some good oral intake of fluid despite the fact that it makes him urinate more often as this will help in the recovery of his kidneys and prevent us from having to place him back on IV fluids.  He is not describing any continued urinary  "retention today.     Vitals: Blood Pressure: 134/68 (07/16/25 1022)  Pulse: 81 (07/16/25 1022)  Temperature: 97.9 °F (36.6 °C) (07/16/25 1022)  Temp Source: Oral (07/16/25 0921)  Respirations: 17 (07/16/25 1022)  Height: 5' 8\" (172.7 cm) (07/14/25 1516)  Weight - Scale: 136 kg (300 lb) (07/14/25 1516)  SpO2: 97 % (07/16/25 1022)  Physical Exam  Vitals and nursing note reviewed.   Constitutional:       General: He is not in acute distress.     Appearance: He is well-developed.   HENT:      Head: Normocephalic and atraumatic.     Eyes:      Conjunctiva/sclera: Conjunctivae normal.       Cardiovascular:      Rate and Rhythm: Normal rate and regular rhythm.      Heart sounds: No murmur heard.  Pulmonary:      Effort: Pulmonary effort is normal. No respiratory distress.      Breath sounds: Normal breath sounds.   Abdominal:      Palpations: Abdomen is soft.      Tenderness: There is no abdominal tenderness.     Musculoskeletal:         General: No swelling.      Cervical back: Neck supple.      Right lower leg: Edema present.      Left lower leg: Edema present.      Comments: 1+ bilateral lower extremity edema     Skin:     General: Skin is warm and dry.      Capillary Refill: Capillary refill takes less than 2 seconds.     Neurological:      Mental Status: He is alert.     Psychiatric:         Mood and Affect: Mood normal.          Discussion with Family: Patient declined call to .     Discharge instructions/Information to patient and family:   See after visit summary for information provided to patient and family.      Provisions for Follow-Up Care:  See after visit summary for information related to follow-up care and any pertinent home health orders.      Mobility at time of Discharge:   Basic Mobility Inpatient Raw Score: 18  JH-HLM Goal: 6: Walk 10 steps or more  JH-HLM Achieved: 7: Walk 25 feet or more  HLM Goal achieved. Continue to encourage appropriate mobility.     Disposition:   Home    Planned " Readmission: None    Administrative Statements   Discharge Statement:  I have spent a total time of 30 minutes in caring for this patient on the day of the visit/encounter. .    **Please Note: This note may have been constructed using a voice recognition system**

## 2025-07-16 NOTE — ASSESSMENT & PLAN NOTE
Patient was down from Wednesday night to Friday afternoon without food or water  In ED, Cr: 1.71 (baseline 1.1-1.3), CK:491  Likely pre-renal 2/2 decreased PO intake. Could consider ATN given elevated CK but less likely  Labs morning 7/16 showing resolution of CK now 171, creatinine improving now 1.4    Plan:  Gentle Fluid hydration, can consider lasix at a later time for chronic edema  Hold home Metformin and Lisinopril

## 2025-07-17 ENCOUNTER — TRANSITIONAL CARE MANAGEMENT (OUTPATIENT)
Dept: FAMILY MEDICINE CLINIC | Facility: CLINIC | Age: 70
End: 2025-07-17

## 2025-07-17 LAB
ATRIAL RATE: 82 BPM
P AXIS: 41 DEGREES
PR INTERVAL: 178 MS
QRS AXIS: 1 DEGREES
QRSD INTERVAL: 94 MS
QT INTERVAL: 372 MS
QTC INTERVAL: 434 MS
T WAVE AXIS: 21 DEGREES
VENTRICULAR RATE: 82 BPM

## 2025-07-17 PROCEDURE — 93010 ELECTROCARDIOGRAM REPORT: CPT | Performed by: INTERNAL MEDICINE

## 2025-07-28 ENCOUNTER — OFFICE VISIT (OUTPATIENT)
Dept: FAMILY MEDICINE CLINIC | Facility: CLINIC | Age: 70
End: 2025-07-28
Payer: MEDICARE

## 2025-07-28 ENCOUNTER — TELEPHONE (OUTPATIENT)
Age: 70
End: 2025-07-28

## 2025-07-28 VITALS
RESPIRATION RATE: 16 BRPM | DIASTOLIC BLOOD PRESSURE: 70 MMHG | HEIGHT: 68 IN | BODY MASS INDEX: 44.1 KG/M2 | SYSTOLIC BLOOD PRESSURE: 120 MMHG | OXYGEN SATURATION: 97 % | WEIGHT: 291 LBS | HEART RATE: 89 BPM

## 2025-07-28 DIAGNOSIS — N17.9 AKI (ACUTE KIDNEY INJURY) (HCC): ICD-10-CM

## 2025-07-28 DIAGNOSIS — R60.0 LEG EDEMA: ICD-10-CM

## 2025-07-28 DIAGNOSIS — N18.31 STAGE 3A CHRONIC KIDNEY DISEASE (HCC): ICD-10-CM

## 2025-07-28 DIAGNOSIS — E11.22 TYPE 2 DIABETES MELLITUS WITH STAGE 3A CHRONIC KIDNEY DISEASE AND HYPERTENSION (HCC): Primary | ICD-10-CM

## 2025-07-28 DIAGNOSIS — I12.9 TYPE 2 DIABETES MELLITUS WITH STAGE 3A CHRONIC KIDNEY DISEASE AND HYPERTENSION (HCC): Primary | ICD-10-CM

## 2025-07-28 DIAGNOSIS — N18.31 TYPE 2 DIABETES MELLITUS WITH STAGE 3A CHRONIC KIDNEY DISEASE AND HYPERTENSION (HCC): Primary | ICD-10-CM

## 2025-07-28 DIAGNOSIS — S31.829D WOUND OF LEFT BUTTOCK, SUBSEQUENT ENCOUNTER: ICD-10-CM

## 2025-07-28 DIAGNOSIS — M1A.9XX0 CHRONIC GOUT WITHOUT TOPHUS, UNSPECIFIED CAUSE, UNSPECIFIED SITE: ICD-10-CM

## 2025-07-28 PROCEDURE — 99495 TRANSJ CARE MGMT MOD F2F 14D: CPT | Performed by: FAMILY MEDICINE

## 2025-07-29 ENCOUNTER — APPOINTMENT (OUTPATIENT)
Dept: LAB | Facility: CLINIC | Age: 70
End: 2025-07-29
Attending: FAMILY MEDICINE
Payer: MEDICARE

## 2025-07-29 DIAGNOSIS — N18.31 TYPE 2 DIABETES MELLITUS WITH STAGE 3A CHRONIC KIDNEY DISEASE AND HYPERTENSION (HCC): ICD-10-CM

## 2025-07-29 DIAGNOSIS — E11.22 TYPE 2 DIABETES MELLITUS WITH STAGE 3A CHRONIC KIDNEY DISEASE AND HYPERTENSION (HCC): ICD-10-CM

## 2025-07-29 DIAGNOSIS — N17.9 AKI (ACUTE KIDNEY INJURY) (HCC): ICD-10-CM

## 2025-07-29 DIAGNOSIS — I12.9 TYPE 2 DIABETES MELLITUS WITH STAGE 3A CHRONIC KIDNEY DISEASE AND HYPERTENSION (HCC): ICD-10-CM

## 2025-07-29 LAB
ANION GAP SERPL CALCULATED.3IONS-SCNC: 7 MMOL/L (ref 4–13)
BUN SERPL-MCNC: 18 MG/DL (ref 5–25)
CALCIUM SERPL-MCNC: 9.2 MG/DL (ref 8.4–10.2)
CHLORIDE SERPL-SCNC: 101 MMOL/L (ref 96–108)
CO2 SERPL-SCNC: 27 MMOL/L (ref 21–32)
CREAT SERPL-MCNC: 1.19 MG/DL (ref 0.6–1.3)
ERYTHROCYTE [DISTWIDTH] IN BLOOD BY AUTOMATED COUNT: 14.6 % (ref 11.6–15.1)
GFR SERPL CREATININE-BSD FRML MDRD: 61 ML/MIN/1.73SQ M
GLUCOSE P FAST SERPL-MCNC: 175 MG/DL (ref 65–99)
HCT VFR BLD AUTO: 37.3 % (ref 36.5–49.3)
HGB BLD-MCNC: 11.9 G/DL (ref 12–17)
MCH RBC QN AUTO: 29.5 PG (ref 26.8–34.3)
MCHC RBC AUTO-ENTMCNC: 31.9 G/DL (ref 31.4–37.4)
MCV RBC AUTO: 92 FL (ref 82–98)
PLATELET # BLD AUTO: 159 THOUSANDS/UL (ref 149–390)
PMV BLD AUTO: 11.8 FL (ref 8.9–12.7)
POTASSIUM SERPL-SCNC: 4.4 MMOL/L (ref 3.5–5.3)
RBC # BLD AUTO: 4.04 MILLION/UL (ref 3.88–5.62)
SODIUM SERPL-SCNC: 135 MMOL/L (ref 135–147)
WBC # BLD AUTO: 7.74 THOUSAND/UL (ref 4.31–10.16)

## 2025-07-29 PROCEDURE — 80048 BASIC METABOLIC PNL TOTAL CA: CPT

## 2025-07-29 PROCEDURE — 36415 COLL VENOUS BLD VENIPUNCTURE: CPT

## 2025-07-29 PROCEDURE — 85027 COMPLETE CBC AUTOMATED: CPT
